# Patient Record
Sex: FEMALE | Race: BLACK OR AFRICAN AMERICAN | NOT HISPANIC OR LATINO | URBAN - METROPOLITAN AREA
[De-identification: names, ages, dates, MRNs, and addresses within clinical notes are randomized per-mention and may not be internally consistent; named-entity substitution may affect disease eponyms.]

---

## 2017-07-18 ENCOUNTER — INPATIENT (INPATIENT)
Facility: HOSPITAL | Age: 82
LOS: 7 days | Discharge: HOME | DRG: 570 | End: 2017-07-26
Attending: INTERNAL MEDICINE | Admitting: INTERNAL MEDICINE
Payer: MEDICARE

## 2017-07-18 VITALS
TEMPERATURE: 99 F | RESPIRATION RATE: 16 BRPM | SYSTOLIC BLOOD PRESSURE: 113 MMHG | DIASTOLIC BLOOD PRESSURE: 71 MMHG | HEART RATE: 71 BPM

## 2017-07-18 DIAGNOSIS — L89.154 PRESSURE ULCER OF SACRAL REGION, STAGE 4: ICD-10-CM

## 2017-07-18 LAB
ALBUMIN SERPL ELPH-MCNC: 2.9 G/DL — LOW (ref 3.3–5)
ALP SERPL-CCNC: 81 U/L — SIGNIFICANT CHANGE UP (ref 40–120)
ALT FLD-CCNC: 15 U/L RC — SIGNIFICANT CHANGE UP (ref 10–45)
ANION GAP SERPL CALC-SCNC: 15 MMOL/L — SIGNIFICANT CHANGE UP (ref 5–17)
APTT BLD: 24.3 SEC — LOW (ref 27.5–37.4)
AST SERPL-CCNC: 20 U/L — SIGNIFICANT CHANGE UP (ref 10–40)
BASOPHILS # BLD AUTO: 0 K/UL — SIGNIFICANT CHANGE UP (ref 0–0.2)
BASOPHILS NFR BLD AUTO: 0.2 % — SIGNIFICANT CHANGE UP (ref 0–2)
BILIRUB SERPL-MCNC: 0.4 MG/DL — SIGNIFICANT CHANGE UP (ref 0.2–1.2)
BUN SERPL-MCNC: 36 MG/DL — HIGH (ref 7–23)
CALCIUM SERPL-MCNC: 9.3 MG/DL — SIGNIFICANT CHANGE UP (ref 8.4–10.5)
CHLORIDE SERPL-SCNC: 104 MMOL/L — SIGNIFICANT CHANGE UP (ref 96–108)
CO2 SERPL-SCNC: 18 MMOL/L — LOW (ref 22–31)
CREAT SERPL-MCNC: 0.83 MG/DL — SIGNIFICANT CHANGE UP (ref 0.5–1.3)
EOSINOPHIL # BLD AUTO: 0.1 K/UL — SIGNIFICANT CHANGE UP (ref 0–0.5)
EOSINOPHIL NFR BLD AUTO: 0.4 % — SIGNIFICANT CHANGE UP (ref 0–6)
GAS PNL BLDV: SIGNIFICANT CHANGE UP
GLUCOSE SERPL-MCNC: 211 MG/DL — HIGH (ref 70–99)
HCT VFR BLD CALC: 25.9 % — LOW (ref 34.5–45)
HGB BLD-MCNC: 8.2 G/DL — LOW (ref 11.5–15.5)
INR BLD: 1.35 RATIO — HIGH (ref 0.88–1.16)
LYMPHOCYTES # BLD AUTO: 1 K/UL — SIGNIFICANT CHANGE UP (ref 1–3.3)
LYMPHOCYTES # BLD AUTO: 4.3 % — LOW (ref 13–44)
MCHC RBC-ENTMCNC: 27.2 PG — SIGNIFICANT CHANGE UP (ref 27–34)
MCHC RBC-ENTMCNC: 31.8 GM/DL — LOW (ref 32–36)
MCV RBC AUTO: 85.7 FL — SIGNIFICANT CHANGE UP (ref 80–100)
MONOCYTES # BLD AUTO: 1.1 K/UL — HIGH (ref 0–0.9)
MONOCYTES NFR BLD AUTO: 4.9 % — SIGNIFICANT CHANGE UP (ref 2–14)
NEUTROPHILS # BLD AUTO: 19.9 K/UL — HIGH (ref 1.8–7.4)
NEUTROPHILS NFR BLD AUTO: 90.2 % — HIGH (ref 43–77)
PLATELET # BLD AUTO: 497 K/UL — HIGH (ref 150–400)
POTASSIUM SERPL-MCNC: 4.8 MMOL/L — SIGNIFICANT CHANGE UP (ref 3.5–5.3)
POTASSIUM SERPL-SCNC: 4.8 MMOL/L — SIGNIFICANT CHANGE UP (ref 3.5–5.3)
PROT SERPL-MCNC: 6 G/DL — SIGNIFICANT CHANGE UP (ref 6–8.3)
PROTHROM AB SERPL-ACNC: 14.8 SEC — HIGH (ref 9.8–12.7)
RBC # BLD: 3.02 M/UL — LOW (ref 3.8–5.2)
RBC # FLD: 14.6 % — HIGH (ref 10.3–14.5)
SODIUM SERPL-SCNC: 137 MMOL/L — SIGNIFICANT CHANGE UP (ref 135–145)
WBC # BLD: 22.1 K/UL — HIGH (ref 3.8–10.5)
WBC # FLD AUTO: 22.1 K/UL — HIGH (ref 3.8–10.5)

## 2017-07-18 PROCEDURE — 99222 1ST HOSP IP/OBS MODERATE 55: CPT

## 2017-07-18 PROCEDURE — 72193 CT PELVIS W/DYE: CPT | Mod: 26

## 2017-07-18 PROCEDURE — 93306 TTE W/DOPPLER COMPLETE: CPT | Mod: 26

## 2017-07-18 PROCEDURE — 99285 EMERGENCY DEPT VISIT HI MDM: CPT | Mod: GC

## 2017-07-18 RX ORDER — SODIUM CHLORIDE 9 MG/ML
3 INJECTION INTRAMUSCULAR; INTRAVENOUS; SUBCUTANEOUS ONCE
Qty: 0 | Refills: 0 | Status: COMPLETED | OUTPATIENT
Start: 2017-07-18 | End: 2017-07-18

## 2017-07-18 RX ORDER — DEXTROSE 50 % IN WATER 50 %
25 SYRINGE (ML) INTRAVENOUS ONCE
Qty: 0 | Refills: 0 | Status: DISCONTINUED | OUTPATIENT
Start: 2017-07-18 | End: 2017-07-26

## 2017-07-18 RX ORDER — ASPIRIN/CALCIUM CARB/MAGNESIUM 324 MG
81 TABLET ORAL DAILY
Qty: 0 | Refills: 0 | Status: DISCONTINUED | OUTPATIENT
Start: 2017-07-18 | End: 2017-07-26

## 2017-07-18 RX ORDER — HEPARIN SODIUM 5000 [USP'U]/ML
5000 INJECTION INTRAVENOUS; SUBCUTANEOUS EVERY 12 HOURS
Qty: 0 | Refills: 0 | Status: DISCONTINUED | OUTPATIENT
Start: 2017-07-18 | End: 2017-07-26

## 2017-07-18 RX ORDER — LISINOPRIL 2.5 MG/1
20 TABLET ORAL DAILY
Qty: 0 | Refills: 0 | Status: DISCONTINUED | OUTPATIENT
Start: 2017-07-18 | End: 2017-07-18

## 2017-07-18 RX ORDER — PIPERACILLIN AND TAZOBACTAM 4; .5 G/20ML; G/20ML
3.38 INJECTION, POWDER, LYOPHILIZED, FOR SOLUTION INTRAVENOUS ONCE
Qty: 0 | Refills: 0 | Status: COMPLETED | OUTPATIENT
Start: 2017-07-18 | End: 2017-07-18

## 2017-07-18 RX ORDER — SODIUM CHLORIDE 9 MG/ML
1000 INJECTION INTRAMUSCULAR; INTRAVENOUS; SUBCUTANEOUS ONCE
Qty: 0 | Refills: 0 | Status: COMPLETED | OUTPATIENT
Start: 2017-07-18 | End: 2017-07-18

## 2017-07-18 RX ORDER — VANCOMYCIN HCL 1 G
1000 VIAL (EA) INTRAVENOUS EVERY 24 HOURS
Qty: 0 | Refills: 0 | Status: DISCONTINUED | OUTPATIENT
Start: 2017-07-18 | End: 2017-07-21

## 2017-07-18 RX ORDER — CARVEDILOL PHOSPHATE 80 MG/1
12.5 CAPSULE, EXTENDED RELEASE ORAL EVERY 12 HOURS
Qty: 0 | Refills: 0 | Status: DISCONTINUED | OUTPATIENT
Start: 2017-07-18 | End: 2017-07-26

## 2017-07-18 RX ORDER — INSULIN LISPRO 100/ML
VIAL (ML) SUBCUTANEOUS
Qty: 0 | Refills: 0 | Status: DISCONTINUED | OUTPATIENT
Start: 2017-07-18 | End: 2017-07-26

## 2017-07-18 RX ORDER — DEXTROSE 50 % IN WATER 50 %
1 SYRINGE (ML) INTRAVENOUS ONCE
Qty: 0 | Refills: 0 | Status: DISCONTINUED | OUTPATIENT
Start: 2017-07-18 | End: 2017-07-26

## 2017-07-18 RX ORDER — SODIUM CHLORIDE 9 MG/ML
1000 INJECTION, SOLUTION INTRAVENOUS
Qty: 0 | Refills: 0 | Status: DISCONTINUED | OUTPATIENT
Start: 2017-07-18 | End: 2017-07-26

## 2017-07-18 RX ORDER — LISINOPRIL 2.5 MG/1
10 TABLET ORAL DAILY
Qty: 0 | Refills: 0 | Status: DISCONTINUED | OUTPATIENT
Start: 2017-07-18 | End: 2017-07-20

## 2017-07-18 RX ORDER — DEXTROSE 50 % IN WATER 50 %
12.5 SYRINGE (ML) INTRAVENOUS ONCE
Qty: 0 | Refills: 0 | Status: DISCONTINUED | OUTPATIENT
Start: 2017-07-18 | End: 2017-07-26

## 2017-07-18 RX ORDER — OXYCODONE AND ACETAMINOPHEN 5; 325 MG/1; MG/1
1 TABLET ORAL EVERY 12 HOURS
Qty: 0 | Refills: 0 | Status: DISCONTINUED | OUTPATIENT
Start: 2017-07-18 | End: 2017-07-25

## 2017-07-18 RX ORDER — PIPERACILLIN AND TAZOBACTAM 4; .5 G/20ML; G/20ML
3.38 INJECTION, POWDER, LYOPHILIZED, FOR SOLUTION INTRAVENOUS EVERY 8 HOURS
Qty: 0 | Refills: 0 | Status: DISCONTINUED | OUTPATIENT
Start: 2017-07-18 | End: 2017-07-25

## 2017-07-18 RX ORDER — GLUCAGON INJECTION, SOLUTION 0.5 MG/.1ML
1 INJECTION, SOLUTION SUBCUTANEOUS ONCE
Qty: 0 | Refills: 0 | Status: DISCONTINUED | OUTPATIENT
Start: 2017-07-18 | End: 2017-07-26

## 2017-07-18 RX ORDER — VANCOMYCIN HCL 1 G
1000 VIAL (EA) INTRAVENOUS ONCE
Qty: 0 | Refills: 0 | Status: COMPLETED | OUTPATIENT
Start: 2017-07-18 | End: 2017-07-18

## 2017-07-18 RX ADMIN — PIPERACILLIN AND TAZOBACTAM 25 GRAM(S): 4; .5 INJECTION, POWDER, LYOPHILIZED, FOR SOLUTION INTRAVENOUS at 23:45

## 2017-07-18 RX ADMIN — OXYCODONE AND ACETAMINOPHEN 1 TABLET(S): 5; 325 TABLET ORAL at 19:08

## 2017-07-18 RX ADMIN — HEPARIN SODIUM 5000 UNIT(S): 5000 INJECTION INTRAVENOUS; SUBCUTANEOUS at 15:33

## 2017-07-18 RX ADMIN — Medication 81 MILLIGRAM(S): at 15:33

## 2017-07-18 RX ADMIN — PIPERACILLIN AND TAZOBACTAM 200 GRAM(S): 4; .5 INJECTION, POWDER, LYOPHILIZED, FOR SOLUTION INTRAVENOUS at 12:25

## 2017-07-18 RX ADMIN — CARVEDILOL PHOSPHATE 12.5 MILLIGRAM(S): 80 CAPSULE, EXTENDED RELEASE ORAL at 15:33

## 2017-07-18 RX ADMIN — OXYCODONE AND ACETAMINOPHEN 1 TABLET(S): 5; 325 TABLET ORAL at 19:44

## 2017-07-18 RX ADMIN — LISINOPRIL 20 MILLIGRAM(S): 2.5 TABLET ORAL at 15:33

## 2017-07-18 RX ADMIN — SODIUM CHLORIDE 3 MILLILITER(S): 9 INJECTION INTRAMUSCULAR; INTRAVENOUS; SUBCUTANEOUS at 12:05

## 2017-07-18 RX ADMIN — Medication 250 MILLIGRAM(S): at 13:01

## 2017-07-18 RX ADMIN — SODIUM CHLORIDE 1000 MILLILITER(S): 9 INJECTION INTRAMUSCULAR; INTRAVENOUS; SUBCUTANEOUS at 12:24

## 2017-07-18 NOTE — H&P ADULT - ASSESSMENT
pt  SENT  FROM N HOME  WITH  SACRAL PAIN FROM [facility], LOCAL  WOUND CARE, ON  ZOSYN,  HOUSE ID  CALLED, DVT   PPX, , ,  ANEMIA,  FOLLOW  LABS  IN AM pt  SENT  FROM  HOME  WITH  SACRAL PAIN FROM [facility], LOCAL  WOUND CARE, ON  ZOSYN,  HOUSE ID  CALLED, DVT   PPX, ,   chronic  ,  ANEMIA,  FOLLOW  LABS  IN AM,  ct pending,  spoke  with family,  has  MOLST  form in [place]/ pt is  DNR,  DECUBITIS  ULCER  IS  SO    WIDE  AND  DEEP, THAT  IT  WILL BE  IMPOSSIBLE TO  ACHIEVE  GOOD  WOUND  HEALING,  KEATNO  SINCE  PT  IS MOSTLY  BED  BOUND

## 2017-07-18 NOTE — CONSULT NOTE ADULT - SUBJECTIVE AND OBJECTIVE BOX
CHIEF COMPLAINT:Patient is a 84y old  Female who presents with a chief complaint of sacral  pain (18 Jul 2017 13:26) and sob.      HPI:   84yoF hx DM, HTN sent in from [facility] for 'wound care'. per granddaughter at bedside pt has been having sacral pain   from  sacral  ulcer.,   for  past  2  months,  pt has been in and out of hospitals since then. intermittent fevers/chills/weakness. no LE numbness. no cp, sob. states wound is foul smelling.,  seen in  er.  with  high  wbc,,  foul  smelling  large  ulcer noted. pt apparntly also is been having sob and tachycardia.      PAST MEDICAL & SURGICAL HISTORY:  No pertinent past medical history  No significant past surgical history      MEDICATIONS  (STANDING):  carvedilol 12.5 milliGRAM(s) Oral every 12 hours  aspirin enteric coated 81 milliGRAM(s) Oral daily  lisinopril 20 milliGRAM(s) Oral daily  insulin lispro (HumaLOG) corrective regimen sliding scale   SubCutaneous three times a day before meals  dextrose 5%. 1000 milliLiter(s) (50 mL/Hr) IV Continuous <Continuous>  dextrose 50% Injectable 12.5 Gram(s) IV Push once  dextrose 50% Injectable 25 Gram(s) IV Push once  dextrose 50% Injectable 25 Gram(s) IV Push once  heparin  Injectable 5000 Unit(s) SubCutaneous every 12 hours  piperacillin/tazobactam IVPB. 3.375 Gram(s) IV Intermittent every 8 hours    MEDICATIONS  (PRN):  dextrose Gel 1 Dose(s) Oral once PRN Blood Glucose LESS THAN 70 milliGRAM(s)/deciliter  glucagon  Injectable 1 milliGRAM(s) IntraMuscular once PRN Glucose LESS THAN 70 milligrams/deciliter  oxyCODONE    5 mG/acetaminophen 325 mG 1 Tablet(s) Oral every 12 hours PRN Moderate Pain (4 - 6)      FAMILY HISTORY:  No pertinent family history in first degree relatives      SOCIAL HISTORY:    [ ] Non-smoker  [ ] Smoker  [ ] Alcohol    Allergies    Sinemet (Unknown)    Intolerances    	    REVIEW OF SYSTEMS:  CONSTITUTIONAL: + fever, weight loss, fatigue  EYES: No eye pain, visual disturbances, or discharge  ENT:  No difficulty hearing, tinnitus, vertigo; No sinus or throat pain  NECK: No pain or stiffness  RESPIRATORY: No cough, wheezing, chills or hemoptysis; + Shortness of Breath  CARDIOVASCULAR: No chest pain, palpitations, passing out, dizziness, or leg swelling  GASTROINTESTINAL: No abdominal or epigastric pain. No nausea, vomiting, or hematemesis; No diarrhea or constipation. No melena or hematochezia.  GENITOURINARY: No dysuria, frequency, hematuria, or incontinence  NEUROLOGICAL: No headaches, memory loss, loss of strength, numbness, or tremors  SKIN: No itching, burning, rashes, or lesions   LYMPH Nodes: No enlarged glands  ENDOCRINE: No heat or cold intolerance; No hair loss  MUSCULOSKELETAL: +sacral pain  PSYCHIATRIC: No depression, anxiety, mood swings, or difficulty sleeping  HEME/LYMPH: No easy bruising, or bleeding gums  ALLERGY AND IMMUNOLOGIC: No hives or eczema	    [ ] All others negative	  [ ] Unable to obtain    PHYSICAL EXAM:  T(C): 36.9 (07-18-17 @ 13:10), Max: 37.2 (07-18-17 @ 11:40)  HR: 60 (07-18-17 @ 13:41) (60 - 71)  BP: 110/60 (07-18-17 @ 13:41) (100/52 - 116/69)  RR: 16 (07-18-17 @ 13:41) (16 - 18)  SpO2: 100% (07-18-17 @ 13:41) (97% - 100%)  Wt(kg): --  I&O's Summary      Appearance: Normal	  HEENT:   Normal oral mucosa, PERRL, EOMI	  Lymphatic: No lymphadenopathy  Cardiovascular: Normal S1 S2, No JVD,+ SE murmur, No edema  Respiratory: Lungs clear to auscultation	  Psychiatry: A & O x 3, Mood & affect appropriate  Gastrointestinal:  Soft, Non-tender, + BS	  Skin: No rashes, No ecchymoses, No cyanosis	  Neurologic: Non-focal  Extremities: Normal range of motion, No clubbing, cyanosis or edema  Vascular: Peripheral pulses palpable 2+ bilaterally    TELEMETRY: 	    ECG:  	  RADIOLOGY:  OTHER: 	  	  LABS:	 	    CARDIAC MARKERS:                              8.2    22.1  )-----------( 497      ( 18 Jul 2017 12:15 )             25.9     07-18    137  |  104  |  36<H>  ----------------------------<  211<H>  4.8   |  18<L>  |  0.83    Ca    9.3      18 Jul 2017 12:15    TPro  6.0  /  Alb  2.9<L>  /  TBili  0.4  /  DBili  x   /  AST  20  /  ALT  15  /  AlkPhos  81  07-18    proBNP:   Lipid Profile:   HgA1c:   TSH

## 2017-07-18 NOTE — ED ADULT NURSE NOTE - OBJECTIVE STATEMENT
83 y/o Female BIB EMS  from [facility] for 'wound care'.  As per granddaughter at bedside, pt has been having sacral pain with sacral decubitus over the last two months with foul smelling. Pt has intermittent fevers, chills, weakness.  No LE numbness, no chest pain,  no sob.  Skin warm and dry.  Large stage 4 sacral decubitus.  Respiration easy and non labored.  No edema.

## 2017-07-18 NOTE — H&P ADULT - NSHPLABSRESULTS_GEN_ALL_CORE
LABS:                        8.2    22.1  )-----------( 497      ( 18 Jul 2017 12:15 )             25.9     07-18    137  |  104  |  36<H>  ----------------------------<  211<H>  4.8   |  18<L>  |  0.83    Ca    9.3      18 Jul 2017 12:15    TPro  6.0  /  Alb  2.9<L>  /  TBili  0.4  /  DBili  x   /  AST  20  /  ALT  15  /  AlkPhos  81  07-18    PT/INR - ( 18 Jul 2017 12:15 )   PT: 14.8 sec;   INR: 1.35 ratio         PTT - ( 18 Jul 2017 12:15 )  PTT:24.3 sec        RADIOLOGY & ADDITIONAL TESTS:

## 2017-07-18 NOTE — ED ADULT NURSE REASSESSMENT NOTE - NS ED NURSE REASSESS COMMENT FT1
Addendum note:  Pt pulled out IV as per grand daughter,  Dr. Sepulveda informed.  Pt went to CT Scan,  IV inserted by IV nurse to left arm 22 guage in CT scan.   Dr Sepulveda aware.  Pt returned from CT scan with IV site mildly swollen from IV contrast as per Dr. Sepulveda.    MD does not want IV removed but site to be watched.  Warm compress applied as per MD.  Holding nurse Laura Minaya informed as well.

## 2017-07-18 NOTE — ED PROVIDER NOTE - MEDICAL DECISION MAKING DETAILS
Steph Sepulveda M.D: 84yoF p/w severe sacral ulcer, and intermittent fevers. hx dm. wound looks and smells infected, concern for osteomyelitis. labs, cultures, antibiotics, ct pelvis, TBA

## 2017-07-18 NOTE — CONSULT NOTE ADULT - ASSESSMENT
pt with sepsis with mara r/o aortic stenosis/endocarditis  check blood culture  agree with broad spectrum abx  decrease lisinopril dose due to decrease bp  check echo  tsh  dvt prophylaxis  plastic/wound consult.' pt with sepsis with mara r/o aortic stenosis/endocarditis  check blood culture  agree with broad spectrum abx  decrease lisinopril dose due to decrease bp  check echo  may add vancomycin  tsh  dvt prophylaxis  plastic/wound consult.'

## 2017-07-18 NOTE — ED PROVIDER NOTE - PROGRESS NOTE DETAILS
Patient was evaluated by me, found in no acute distress, calm, speaking in complete sentences. I agree with resident assessment and plan. Dr. Kishan Tong

## 2017-07-18 NOTE — CONSULT NOTE ADULT - SUBJECTIVE AND OBJECTIVE BOX
Patient is a 84y old  Female who presents with a chief complaint of sacral  pain (18 Jul 2017 13:26)    HPI:  : 84yoF hx DM, HTN sent in from [facility] for 'wound care'. per granddaughter at bedside pt has been having sacral pain   from  sacral  ulcer.,   for  past  2  months,  pt has been in and out of hospitals since then. intermittent fevers/chills/weakness. no LE numbness. no cp, sob. states wound is foul smelling.,  seen in  er.  with  high  wbc,,  foul  smelling  large  ulcer noted,  pt  seen in  er ,  house  id  called,  ct  pending. pt  has  MOLST  form,  in  chart,   DNR  confirmed  with  family (18 Jul 2017 13:26)    Seen in ED: "I dont know how this happened to me." She denies current pain      PAST MEDICAL & SURGICAL HISTORY:  No pertinent past medical history  No significant past surgical history      Social history:    FAMILY HISTORY:  No pertinent family history in first degree relatives      REVIEW OF SYSTEMS:  CONSTITUTIONAL: No weakness, fevers or chills  EYES/ENT: No visual changes;  No vertigo or throat pain   NECK: No pain or stiffness  RESPIRATORY: No cough, wheezing, hemoptysis; No shortness of breath  CARDIOVASCULAR: No chest pain or palpitations  GASTROINTESTINAL: No abdominal or epigastric pain. No nausea, vomiting, or hematemesis; No diarrhea or constipation. No melena or hematochezia.  GENITOURINARY: No dysuria, frequency or hematuria  NEUROLOGICAL: No numbness or weakness  SKIN: No itching, burning, rashes, or lesions   All other review of systems is negative unless indicated above    Allergies    Sinemet (Unknown)        Antimicrobials:  piperacillin/tazobactam IVPB. 3.375 Gram(s) IV Intermittent every 8 hours      Vital Signs Last 24 Hrs  T(C): 36.4 (18 Jul 2017 17:58), Max: 37.2 (18 Jul 2017 11:40)  T(F): 97.6 (18 Jul 2017 17:58), Max: 98.9 (18 Jul 2017 11:40)  HR: 62 (18 Jul 2017 17:58) (60 - 71)  BP: 95/50 (18 Jul 2017 17:58) (95/50 - 116/69)  BP(mean): --  RR: 18 (18 Jul 2017 17:58) (16 - 18)  SpO2: 96% (18 Jul 2017 17:58) (96% - 100%)    PHYSICAL EXAM:  General: WN/WD NAD, Non-toxic, chronically ill appearing, dry mucous membranes  Neurology: A&Ox3, nonfocal  Respiratory: Clear to auscultation bilaterally  CV: RRR, S1S2, no murmurs, rubs or gallops  Abdominal: Soft, Non-tender, non-distended, normal bowel sounds  Extremities: No edema, + peripheral pulses  Line Sites: Clear LUE  Skin: No rash  She declines exam of wound                        8.2    22.1  )-----------( 497      ( 18 Jul 2017 12:15 )             25.9       07-18    137  |  104  |  36<H>  ----------------------------<  211<H>  4.8   |  18<L>  |  0.83    Ca    9.3      18 Jul 2017 12:15    TPro  6.0  /  Alb  2.9<L>  /  TBili  0.4  /  DBili  x   /  AST  20  /  ALT  15  /  AlkPhos  81  07-18        MICROBIOLOGY:  none available    Radiology:  Patient is a 84y old  Female who presents with a chief complaint of sacral  pain (18 Jul 2017 13:26)    HPI:  : 84yoF hx DM, HTN sent in from [facility] for 'wound care'. per granddaughter at bedside pt has been having sacral pain   from  sacral  ulcer.,   for  past  2  months,  pt has been in and out of hospitals since then. intermittent fevers/chills/weakness. no LE numbness. no cp, sob. states wound is foul smelling.,  seen in  er.  with  high  wbc,,  foul  smelling  large  ulcer noted,  pt  seen in  er ,  house  id  called,  ct  pending. pt  has  MOLST  form,  in  chart,   DNR  confirmed  with  family (18 Jul 2017 13:26)      PAST MEDICAL & SURGICAL HISTORY:  No pertinent past medical history  No significant past surgical history      Social history:    FAMILY HISTORY:  No pertinent family history in first degree relatives      REVIEW OF SYSTEMS:  CONSTITUTIONAL: No weakness, fevers or chills  EYES/ENT: No visual changes;  No vertigo or throat pain   NECK: No pain or stiffness  RESPIRATORY: No cough, wheezing, hemoptysis; No shortness of breath  CARDIOVASCULAR: No chest pain or palpitations  GASTROINTESTINAL: No abdominal or epigastric pain. No nausea, vomiting, or hematemesis; No diarrhea or constipation. No melena or hematochezia.  GENITOURINARY: No dysuria, frequency or hematuria  NEUROLOGICAL: No numbness or weakness  SKIN: No itching, burning, rashes, or lesions   All other review of systems is negative unless indicated above    Allergies    Sinemet (Unknown)    Intolerances        Antimicrobials:  piperacillin/tazobactam IVPB. 3.375 Gram(s) IV Intermittent every 8 hours      Vital Signs Last 24 Hrs  T(C): 36.4 (18 Jul 2017 17:58), Max: 37.2 (18 Jul 2017 11:40)  T(F): 97.6 (18 Jul 2017 17:58), Max: 98.9 (18 Jul 2017 11:40)  HR: 62 (18 Jul 2017 17:58) (60 - 71)  BP: 95/50 (18 Jul 2017 17:58) (95/50 - 116/69)  BP(mean): --  RR: 18 (18 Jul 2017 17:58) (16 - 18)  SpO2: 96% (18 Jul 2017 17:58) (96% - 100%)    PHYSICAL EXAM:  General: WN/WD NAD, Non-toxic  Neurology: A&Ox3, nonfocal  Respiratory: Clear to auscultation bilaterally  CV: RRR, S1S2, no murmurs, rubs or gallops  Abdominal: Soft, Non-tender, non-distended, normal bowel sounds  Extremities: No edema, + peripheral pulses  Line Sites: Clear  Skin: No rash                          8.2    22.1  )-----------( 497      ( 18 Jul 2017 12:15 )             25.9       07-18    137  |  104  |  36<H>  ----------------------------<  211<H>  4.8   |  18<L>  |  0.83    Ca    9.3      18 Jul 2017 12:15    TPro  6.0  /  Alb  2.9<L>  /  TBili  0.4  /  DBili  x   /  AST  20  /  ALT  15  /  AlkPhos  81  07-18        MICROBIOLOGY:          Radiology:    Armando Yates MD; Division of Infectious Disease; Pager: [phone]; nights and weekends: [phone]    Armando Yates MD; Division of Infectious Disease; Pager: [phone]; nights and weekends: [phone]

## 2017-07-18 NOTE — ED PROVIDER NOTE - PHYSICAL EXAMINATION
Steph Sepulveda M.D.:   patient awake alert seen lying on stretcher, weak appearing, NAD .   LUNGS rhonchi b/l.   CARD RRR +systolic murmur.    Abdomen soft NT ND no rebound no guarding no CVA tenderness.   EXT WWP no edema no calf tenderness CV 2+DP/PT bilaterally.   neuro A&Ox3.    skin warm and dry large stage 4 sacral decub  HEENT: moist mucous membranes, PERRL, EOMI

## 2017-07-18 NOTE — ED PROVIDER NOTE - OBJECTIVE STATEMENT
Steph Sepulveda M.D: 84yoF hx DM, [facility] per granddaughter at bedside pt has been having sacral pain over the last two months. pt has been in and out of hospitals since then. intermittent fevers/chills/weakness. no LE numbness. no cp, sob. states wound is foul smelling.

## 2017-07-18 NOTE — H&P ADULT - NSHPPHYSICALEXAM_GEN_ALL_CORE
PHYSICAL EXAMINATION:  Vital Signs Last 24 Hrs  T(C): 37.2 (18 Jul 2017 11:40), Max: 37.2 (18 Jul 2017 11:40)  T(F): 98.9 (18 Jul 2017 11:40), Max: 98.9 (18 Jul 2017 11:40)  HR: 64 (18 Jul 2017 12:40) (60 - 71)  BP: 115/54 (18 Jul 2017 12:40) (100/52 - 115/54)  BP(mean): --  RR: 16 (18 Jul 2017 12:40) (16 - 18)  SpO2: 97% (18 Jul 2017 12:40) (97% - 99%)  CAPILLARY BLOOD GLUCOSE            GENERAL: NAD, well-groomed, well-developed  HEAD:  atraumatic, normocephalic  EYES: sclera anicteric  ENMT: mucous membranes moist  NECK: supple, No JVD  CHEST/LUNG: clear to auscultation bilaterally; no rales, rhonchi, or wheezing b/l  HEART: normal S1, S2  ABDOMEN: BS+, soft, ND, NT   EXTREMITIES:  pulses palpable; no clubbing, cyanosis, or edema b/l LEs  NEURO: awake, alert, interactive; moves all extremities  SKIN: no rashes or lesions  ,  scaral  ulcer  stage 3 PHYSICAL EXAMINATION:  Vital Signs Last 24 Hrs  T(C): 37.2 (18 Jul 2017 11:40), Max: 37.2 (18 Jul 2017 11:40)  T(F): 98.9 (18 Jul 2017 11:40), Max: 98.9 (18 Jul 2017 11:40)  HR: 64 (18 Jul 2017 12:40) (60 - 71)  BP: 115/54 (18 Jul 2017 12:40) (100/52 - 115/54)  BP(mean): --  RR: 16 (18 Jul 2017 12:40) (16 - 18)  SpO2: 97% (18 Jul 2017 12:40) (97% - 99%)  CAPILLARY BLOOD GLUCOSE            GENERAL: NAD, well-groomed, well-developed  HEAD:  atraumatic, normocephalic  EYES: sclera anicteric  ENMT: mucous membranes moist  NECK: supple, No JVD  CHEST/LUNG: clear to auscultation bilaterally; no rales, rhonchi, or wheezing b/l  HEART: normal S1, S2  ABDOMEN: BS+, soft, ND, NT   EXTREMITIES:  pulses palpable; no clubbing, cyanosis, or edema b/l LEs  NEURO: awake, alert, interactive; moves all extremities  SKIN: no rashes or lesions  ,  scaral  ulcer  stage   4,  very  large involving   a very  large  area,  with  foul  smell

## 2017-07-18 NOTE — H&P ADULT - HISTORY OF PRESENT ILLNESS
: 84yoF hx DM, HTN sent in from [facility] for 'wound care'. per granddaughter at bedside pt has been having sacral pain over the last two months. pt has been in and out of hospitals since then. intermittent fevers/chills/weakness. no LE numbness. no cp, sob. states wound is foul smelling.,  seen in  er.  with  high  wbc, : 84yoF hx DM, HTN sent in from [facility] for 'wound care'. per granddaughter at bedside pt has been having sacral pain   from  sacral  ulcer.,   for  past  2  months,  pt has been in and out of hospitals since then. intermittent fevers/chills/weakness. no LE numbness. no cp, sob. states wound is foul smelling.,  seen in  er.  with  high  wbc,,  foul  smelling  large  ulcer noted,  pt  seen in  er ,  house  id  called,  ct  pending. pt  has  MOLST  form,  in  chart,   DNR  confirmed  with  family

## 2017-07-18 NOTE — CONSULT NOTE ADULT - ASSESSMENT
large sacral wound with malodorour drainage with marked leukocytosis -    Suggest Add Vanco (ordered)  Wound care consult  Wound culture    will follow alert

## 2017-07-19 ENCOUNTER — TRANSCRIPTION ENCOUNTER (OUTPATIENT)
Age: 82
End: 2017-07-19

## 2017-07-19 LAB
ANION GAP SERPL CALC-SCNC: 17 MMOL/L — SIGNIFICANT CHANGE UP (ref 5–17)
BLD GP AB SCN SERPL QL: POSITIVE — SIGNIFICANT CHANGE UP
BUN SERPL-MCNC: 39 MG/DL — HIGH (ref 7–23)
CALCIUM SERPL-MCNC: 9.4 MG/DL — SIGNIFICANT CHANGE UP (ref 8.4–10.5)
CHLORIDE SERPL-SCNC: 106 MMOL/L — SIGNIFICANT CHANGE UP (ref 96–108)
CO2 SERPL-SCNC: 17 MMOL/L — LOW (ref 22–31)
CREAT SERPL-MCNC: 1.1 MG/DL — SIGNIFICANT CHANGE UP (ref 0.5–1.3)
DAT POLY-SP REAG RBC QL: NEGATIVE — SIGNIFICANT CHANGE UP
GLUCOSE SERPL-MCNC: 151 MG/DL — HIGH (ref 70–99)
HBA1C BLD-MCNC: 6.9 % — HIGH (ref 4–5.6)
HCT VFR BLD CALC: 22.4 % — LOW (ref 34.5–45)
HGB BLD-MCNC: 7.4 G/DL — LOW (ref 11.5–15.5)
MCHC RBC-ENTMCNC: 25.6 PG — LOW (ref 27–34)
MCHC RBC-ENTMCNC: 33 GM/DL — SIGNIFICANT CHANGE UP (ref 32–36)
MCV RBC AUTO: 77.5 FL — LOW (ref 80–100)
PLATELET # BLD AUTO: 620 K/UL — HIGH (ref 150–400)
POTASSIUM SERPL-MCNC: 4.5 MMOL/L — SIGNIFICANT CHANGE UP (ref 3.5–5.3)
POTASSIUM SERPL-SCNC: 4.5 MMOL/L — SIGNIFICANT CHANGE UP (ref 3.5–5.3)
RBC # BLD: 2.89 M/UL — LOW (ref 3.8–5.2)
RBC # FLD: 15.4 % — HIGH (ref 10.3–14.5)
RH IG SCN BLD-IMP: NEGATIVE — SIGNIFICANT CHANGE UP
RH IG SCN BLD-IMP: NEGATIVE — SIGNIFICANT CHANGE UP
SODIUM SERPL-SCNC: 140 MMOL/L — SIGNIFICANT CHANGE UP (ref 135–145)
TSH SERPL-MCNC: 0.61 UIU/ML — SIGNIFICANT CHANGE UP (ref 0.27–4.2)
VIT B12 SERPL-MCNC: 705 PG/ML — SIGNIFICANT CHANGE UP (ref 243–894)
WBC # BLD: 23.78 K/UL — HIGH (ref 3.8–10.5)
WBC # FLD AUTO: 23.78 K/UL — HIGH (ref 3.8–10.5)

## 2017-07-19 PROCEDURE — 99232 SBSQ HOSP IP/OBS MODERATE 35: CPT

## 2017-07-19 PROCEDURE — 97597 DBRDMT OPN WND 1ST 20 CM/<: CPT

## 2017-07-19 PROCEDURE — 86077 PHYS BLOOD BANK SERV XMATCH: CPT

## 2017-07-19 PROCEDURE — 97598 DBRDMT OPN WND ADDL 20CM/<: CPT

## 2017-07-19 PROCEDURE — 99233 SBSQ HOSP IP/OBS HIGH 50: CPT

## 2017-07-19 RX ADMIN — CARVEDILOL PHOSPHATE 12.5 MILLIGRAM(S): 80 CAPSULE, EXTENDED RELEASE ORAL at 06:53

## 2017-07-19 RX ADMIN — Medication 81 MILLIGRAM(S): at 12:44

## 2017-07-19 RX ADMIN — HEPARIN SODIUM 5000 UNIT(S): 5000 INJECTION INTRAVENOUS; SUBCUTANEOUS at 18:41

## 2017-07-19 RX ADMIN — PIPERACILLIN AND TAZOBACTAM 25 GRAM(S): 4; .5 INJECTION, POWDER, LYOPHILIZED, FOR SOLUTION INTRAVENOUS at 06:53

## 2017-07-19 RX ADMIN — Medication 250 MILLIGRAM(S): at 06:53

## 2017-07-19 RX ADMIN — LISINOPRIL 10 MILLIGRAM(S): 2.5 TABLET ORAL at 06:53

## 2017-07-19 RX ADMIN — PIPERACILLIN AND TAZOBACTAM 25 GRAM(S): 4; .5 INJECTION, POWDER, LYOPHILIZED, FOR SOLUTION INTRAVENOUS at 14:31

## 2017-07-19 RX ADMIN — Medication 2: at 18:41

## 2017-07-19 RX ADMIN — Medication 2: at 12:44

## 2017-07-19 RX ADMIN — HEPARIN SODIUM 5000 UNIT(S): 5000 INJECTION INTRAVENOUS; SUBCUTANEOUS at 06:53

## 2017-07-19 RX ADMIN — OXYCODONE AND ACETAMINOPHEN 1 TABLET(S): 5; 325 TABLET ORAL at 20:45

## 2017-07-19 RX ADMIN — OXYCODONE AND ACETAMINOPHEN 1 TABLET(S): 5; 325 TABLET ORAL at 20:09

## 2017-07-19 RX ADMIN — Medication 2: at 08:39

## 2017-07-19 RX ADMIN — PIPERACILLIN AND TAZOBACTAM 25 GRAM(S): 4; .5 INJECTION, POWDER, LYOPHILIZED, FOR SOLUTION INTRAVENOUS at 21:51

## 2017-07-19 RX ADMIN — CARVEDILOL PHOSPHATE 12.5 MILLIGRAM(S): 80 CAPSULE, EXTENDED RELEASE ORAL at 18:41

## 2017-07-19 NOTE — PROGRESS NOTE ADULT - ASSESSMENT
stage IV sacral decub in bed bound patient with leukocytosis - not clinically toxic    Suggest continue Zosyn, vanco,   wound care   - 7-10 day course IV abx anticipated  Consider Neuro evaluation to assess whether patient can improve mobility

## 2017-07-19 NOTE — DISCHARGE NOTE ADULT - PATIENT PORTAL LINK FT
“You can access the [employer] Patient Portal, offered by [facility], by registering with the following website: [email or web address]”

## 2017-07-19 NOTE — CONSULT NOTE ADULT - SUBJECTIVE AND OBJECTIVE BOX
Wound SURGERY CONSULT NOTE    HPI:  84yoF hx DM, HTN sent in from [facility] for 'wound care'.  (+) pain  from  sacral  ulcer,   for  past  2  months,  pt has been in and out of hospitals since then. intermittent fevers/chills/weakness. no LE numbness. no cp, sob. states wound is foul smelling.,  seen in  er.  with  high  wbc,  foul  smelling  large  ulcer noted,  ID consult noted pt  has  MOLST  form,  in  chart,   DNR  confirmed  with  family.  pt poor historian.  (+)incontinent.  (+)sedentary 2/2 weakness- pt notes participation in PT though      PAST MEDICAL & SURGICAL HISTORY:  DM  HTN    REVIEW OF SYSTEMS    Skin: sacral wound worsening see HPI  all other systems negative      MEDICATIONS  (STANDING):  carvedilol 12.5 milliGRAM(s) Oral every 12 hours  aspirin enteric coated 81 milliGRAM(s) Oral daily  insulin lispro (HumaLOG) corrective regimen sliding scale   SubCutaneous three times a day before meals  dextrose 5%. 1000 milliLiter(s) (50 mL/Hr) IV Continuous <Continuous>  dextrose 50% Injectable 12.5 Gram(s) IV Push once  dextrose 50% Injectable 25 Gram(s) IV Push once  dextrose 50% Injectable 25 Gram(s) IV Push once  heparin  Injectable 5000 Unit(s) SubCutaneous every 12 hours  piperacillin/tazobactam IVPB. 3.375 Gram(s) IV Intermittent every 8 hours  lisinopril 10 milliGRAM(s) Oral daily  vancomycin  IVPB 1000 milliGRAM(s) IV Intermittent every 24 hours    MEDICATIONS  (PRN):  dextrose Gel 1 Dose(s) Oral once PRN Blood Glucose LESS THAN 70 milliGRAM(s)/deciliter  glucagon  Injectable 1 milliGRAM(s) IntraMuscular once PRN Glucose LESS THAN 70 milligrams/deciliter  oxyCODONE    5 mG/acetaminophen 325 mG 1 Tablet(s) Oral every 12 hours PRN Moderate Pain (4 - 6)      Allergies    Sinemet (Unknown)    SOCIAL HISTORY:  at [facility]; Denies smoking, ETOH, drugs    FAMILY HISTORY:  No pertinent family history in first degree relatives      Vital Signs Last 24 Hrs  T(C): 36.9 (19 Jul 2017 12:03), Max: 37.2 (19 Jul 2017 04:33)  T(F): 98.4 (19 Jul 2017 12:03), Max: 98.9 (19 Jul 2017 04:33)  HR: 67 (19 Jul 2017 12:03) (60 - 84)  BP: 115/71 (19 Jul 2017 12:03) (95/50 - 115/71)  BP(mean): --  RR: 18 (19 Jul 2017 12:03) (16 - 18)  SpO2: 96% (19 Jul 2017 12:03) (96% - 100%)    NAD A&Ox3 MO  Versa care P500 bed    Cardiovascular: RRR (+)m    Respiratory: CTA    Gastrointestinal soft NT/ND (+)BS    Neurology sensation grossly intact, weakened strength    Musculoskeletal/Vascular:  FROM x4 limited by weakness  No edema, equally warm  no deformities/ contractures    Skin:  moist w/ good turgor  Sacral Stage IV pressure ulcer (+) 20% moist & granular,  80% necrotic and slough debulked  9.5cm x 9cm x 6cm after debridement- undermining now noted from 6-3 o'clock w/greatest of 7cm tunnel at 6 o'clock into Lt buttock and 6 cm at 2-3 o'clcok  Procedure Note  Using aseptic technique sacral  wound was excisionally debrided using a scissor and forceps through necrotic/ nonviable dermis & subcutaneous tissue.  Pt tolerated procedure well.  Hemostasis was maintained throughout.  After normal saline irrigation wound was packed w/ Medihoney and gauze.   serosanguinous drainage  (+)malodor  (+)exposed bone-rough edges  No erythema, increased warmth, tenderness, induration, fluctuance    LABS:                        7.4    23.78 )-----------( 620      ( 19 Jul 2017 07:25 )             22.4     07-19    140  |  106  |  39<H>  ----------------------------<  151<H>  4.5   |  17<L>  |  1.10    Ca    9.4      19 Jul 2017 07:30    TPro  6.0  /  Alb  2.9<L>  /  TBili  0.4  /  DBili  x   /  AST  20  /  ALT  15  /  AlkPhos  81  07-18    PT/INR - ( 18 Jul 2017 12:15 )   PT: 14.8 sec;   INR: 1.35 ratio      PTT - ( 18 Jul 2017 12:15 )  PTT:24.3 sec    HgA1c  07-19 @ 07:25  6.9      RADIOLOGY & ADDITIONAL STUDIES:    CT Pelvis w/ IV Cont (07.18.17 @ 16:31) >  MPRESSION: Large ulceration in the soft tissues overlying and inferior   to the sacrum and coccyx extending down to level of bone. Associated   marked destruction of the lower sacrum and coccyx, consistent with   osteomyelitis. Adjacent abscesses within the medial aspect of the right   gluteal musculature.

## 2017-07-19 NOTE — PROGRESS NOTE ADULT - ASSESSMENT
sepsis  from infected  sacral decubti, local wound cafe, seen by id, on zosyn/vanco,  dvt ppx,  c/c anemia,. baseline  from n home  is  7,  will give  prbc

## 2017-07-19 NOTE — CONSULT NOTE ADULT - ASSESSMENT
A/P:  Stage IV Sacral Pressure Ulcer  Medihoney and packing w/ cling roll gauze  Abx as per ID  con't Nutrition (as tolerated)  con't Offloading   con't Pericare  f/u as outpatient at [facility] [address] [phone]  Care as per medicine will follow w/ you  Seen w/ attng and d/w team  Thank you for this consult  Carmen Mabry PA-C [id number]

## 2017-07-19 NOTE — DISCHARGE NOTE ADULT - PLAN OF CARE
resolution of symptoms wound care  Dakins 1/4 strength daily to sacrum  irrigate with normal saline  pat dry  cavilon to hemalatha wound  pack with Dakins moistened cling  pack into tunnels at 6 o'clock and into left buttock at 2and 3 o'clock  cover with gauze and tegaderm HgA1C this admission.  Make sure you get your HgA1c checked every three months.  If you take oral diabetes medications, check your blood glucose two times a day.  If you take insulin, check your blood glucose before meals and at bedtime.  It's important not to skip any meals.  Keep a log of your blood glucose results and always take it with you to your doctor appointments.  Keep a list of your current medications including injectables and over the counter medications and bring this medication list with you to all your doctor appointments.  If you have not seen your ophthalmologist this year call for appointment.  Check your feet daily for redness, sores, or openings. Do not self treat. If no improvement in two days call your primary care physician for an appointment.  Low blood sugar (hypoglycemia) is a blood sugar below 70mg/dl. Check your blood sugar if you feel signs/symptoms of hypoglycemia. If your blood sugar is below 70 take 15 grams of carbohydrates (ex 4 oz of apple juice, 3-4 glucose tablets, or 4-6 oz of regular soda) wait 15 minutes and repeat blood sugar to make sure it comes up above 70.  If your blood sugar is above 70 and you are due for a meal, have a meal.  If you are not due for a meal have a snack.  This snack helps keeps your blood sugar at a safe range. Follow up with your medical doctor to establish long term blood pressure treatment goals. wound care  . Sacrum: Cavilon to periwound skin, D/c Dakin's dressing. Apply Medihoney colloid to base of wound. Gently fill wound cavity, tunnelled areas at 1 and 6 o'clock and undermined areas from 6-12 o'clock with continuos moistened roll gauze. cover with gauze and secure with tegaderm. change daily  and prn for drainage.

## 2017-07-19 NOTE — DISCHARGE NOTE ADULT - CARE PLAN
Principal Discharge DX:	Sacral decubitus ulcer, stage IV  Goal:	resolution of symptoms  Instructions for follow-up, activity and diet:	wound care  Dakins 1/4 strength daily to sacrum  irrigate with normal saline  pat dry  cavilon to hemalatha wound  pack with Dakins moistened cling  pack into tunnels at 6 o'clock and into left buttock at 2and 3 o'clock  cover with gauze and tegaderm  Secondary Diagnosis:	Diabetes  Instructions for follow-up, activity and diet:	HgA1C this admission.  Make sure you get your HgA1c checked every three months.  If you take oral diabetes medications, check your blood glucose two times a day.  If you take insulin, check your blood glucose before meals and at bedtime.  It's important not to skip any meals.  Keep a log of your blood glucose results and always take it with you to your doctor appointments.  Keep a list of your current medications including injectables and over the counter medications and bring this medication list with you to all your doctor appointments.  If you have not seen your ophthalmologist this year call for appointment.  Check your feet daily for redness, sores, or openings. Do not self treat. If no improvement in two days call your primary care physician for an appointment.  Low blood sugar (hypoglycemia) is a blood sugar below 70mg/dl. Check your blood sugar if you feel signs/symptoms of hypoglycemia. If your blood sugar is below 70 take 15 grams of carbohydrates (ex 4 oz of apple juice, 3-4 glucose tablets, or 4-6 oz of regular soda) wait 15 minutes and repeat blood sugar to make sure it comes up above 70.  If your blood sugar is above 70 and you are due for a meal, have a meal.  If you are not due for a meal have a snack.  This snack helps keeps your blood sugar at a safe range.  Secondary Diagnosis:	HTN (hypertension)  Instructions for follow-up, activity and diet:	Follow up with your medical doctor to establish long term blood pressure treatment goals. Principal Discharge DX:	Sacral decubitus ulcer, stage IV  Goal:	resolution of symptoms  Instructions for follow-up, activity and diet:	wound care  . Sacrum: Cavilon to periwound skin, D/c Dakin's dressing. Apply Medihoney colloid to base of wound. Gently fill wound cavity, tunnelled areas at 1 and 6 o'clock and undermined areas from 6-12 o'clock with continuos moistened roll gauze. cover with gauze and secure with tegaderm. change daily  and prn for drainage.  Secondary Diagnosis:	Diabetes  Instructions for follow-up, activity and diet:	HgA1C this admission.  Make sure you get your HgA1c checked every three months.  If you take oral diabetes medications, check your blood glucose two times a day.  If you take insulin, check your blood glucose before meals and at bedtime.  It's important not to skip any meals.  Keep a log of your blood glucose results and always take it with you to your doctor appointments.  Keep a list of your current medications including injectables and over the counter medications and bring this medication list with you to all your doctor appointments.  If you have not seen your ophthalmologist this year call for appointment.  Check your feet daily for redness, sores, or openings. Do not self treat. If no improvement in two days call your primary care physician for an appointment.  Low blood sugar (hypoglycemia) is a blood sugar below 70mg/dl. Check your blood sugar if you feel signs/symptoms of hypoglycemia. If your blood sugar is below 70 take 15 grams of carbohydrates (ex 4 oz of apple juice, 3-4 glucose tablets, or 4-6 oz of regular soda) wait 15 minutes and repeat blood sugar to make sure it comes up above 70.  If your blood sugar is above 70 and you are due for a meal, have a meal.  If you are not due for a meal have a snack.  This snack helps keeps your blood sugar at a safe range.  Secondary Diagnosis:	HTN (hypertension)  Instructions for follow-up, activity and diet:	Follow up with your medical doctor to establish long term blood pressure treatment goals.

## 2017-07-19 NOTE — DISCHARGE NOTE ADULT - MEDICATION SUMMARY - MEDICATIONS TO TAKE
I will START or STAY ON the medications listed below when I get home from the hospital:    aspirin 81 mg oral delayed release tablet  -- 1 tab(s) by mouth once a day  -- Indication: For Prophylaxis    lisinopril 2.5 mg oral tablet  -- 1 tab(s) by mouth once a day  -- Indication: For htn    insulin lispro 100 units/mL subcutaneous solution  --  subcutaneous 3 times a day (before meals); 1 Unit(s) if Glucose 151 - 200  2 Unit(s) if Glucose 201 - 250  3 Unit(s) if Glucose 251 - 300  4 Unit(s) if Glucose 301 - 350  5 Unit(s) if Glucose 351 - 400  6 Unit(s) if Glucose Greater Than 400  -- Indication: For Diabetes    sodium hypochlorite 0.125% topical solution  -- 1 application on skin once a day  -- Indication: For wound    carvedilol 12.5 mg oral tablet  -- 1 tab(s) by mouth every 12 hours  -- Indication: For htn    ertapenem 1 g injection  --  injectable   -- Indication: For wound    Multiple Vitamins oral tablet  -- 1 tab(s) by mouth once a day  -- Indication: For Supplement    ascorbic acid 500 mg oral tablet  -- 1 tab(s) by mouth once a day  -- Indication: For Supplement I will START or STAY ON the medications listed below when I get home from the hospital:    aspirin 81 mg oral delayed release tablet  -- 1 tab(s) by mouth once a day  -- Indication: For Prophylaxis    lisinopril 2.5 mg oral tablet  -- 1 tab(s) by mouth once a day  -- Indication: For htn    insulin lispro 100 units/mL subcutaneous solution  --  subcutaneous 3 times a day (before meals); 1 Unit(s) if Glucose 151 - 200  2 Unit(s) if Glucose 201 - 250  3 Unit(s) if Glucose 251 - 300  4 Unit(s) if Glucose 301 - 350  5 Unit(s) if Glucose 351 - 400  6 Unit(s) if Glucose Greater Than 400  -- Indication: For Diabetes    carvedilol 12.5 mg oral tablet  -- 1 tab(s) by mouth every 12 hours  -- Indication: For htn    ertapenem 1 g injection  --  injectable   -- Indication: For wound    Multiple Vitamins oral tablet  -- 1 tab(s) by mouth once a day  -- Indication: For Supplement    ascorbic acid 500 mg oral tablet  -- 1 tab(s) by mouth once a day  -- Indication: For Supplement

## 2017-07-19 NOTE — DISCHARGE NOTE ADULT - HOSPITAL COURSE
sacral pain,  from  stage  4 osteomyelitis,  seen by  house  id  and  wound  care, on  dakins  solution    wound  c/s  with  thomas howard to  rehab,  when  cleared  by  is    h/o   htn,  on  dvt  ppz.  guerrero  to promote  wound  healing,    pt  mostly  bed bound,  therefore   likelihood  of  wound  healing  is  not  so  great,  h/o  of  obesity sacral pain,  from  stage  4 osteomyelitis,  seen by  house  id  and  wound  care, on  dakins  solution    wound  c/s  with  anita,  wound    now,    dc to  rehab,, cleared  by zehra fernández,   has  picc line,  on  Invanz,  to complete  7/ 31./ 17    h/o   htn,  on  dvt  ppx,   guerrero  to promote  wound  healing,     htn.  dm,    pt  mostly  bed bound,  therefore   likelihood  of  wound  healing  is  not  so  great,  h/o  of  obesity

## 2017-07-20 LAB
-  AMIKACIN: SIGNIFICANT CHANGE UP
-  AMPICILLIN/SULBACTAM: SIGNIFICANT CHANGE UP
-  AMPICILLIN: SIGNIFICANT CHANGE UP
-  AZTREONAM: SIGNIFICANT CHANGE UP
-  CEFAZOLIN: SIGNIFICANT CHANGE UP
-  CEFEPIME: SIGNIFICANT CHANGE UP
-  CEFOXITIN: SIGNIFICANT CHANGE UP
-  CEFTAZIDIME: SIGNIFICANT CHANGE UP
-  CEFTRIAXONE: SIGNIFICANT CHANGE UP
-  CIPROFLOXACIN: SIGNIFICANT CHANGE UP
-  ERTAPENEM: SIGNIFICANT CHANGE UP
-  GENTAMICIN: SIGNIFICANT CHANGE UP
-  IMIPENEM: SIGNIFICANT CHANGE UP
-  LEVOFLOXACIN: SIGNIFICANT CHANGE UP
-  MEROPENEM: SIGNIFICANT CHANGE UP
-  PIPERACILLIN/TAZOBACTAM: SIGNIFICANT CHANGE UP
-  TOBRAMYCIN: SIGNIFICANT CHANGE UP
-  TRIMETHOPRIM/SULFAMETHOXAZOLE: SIGNIFICANT CHANGE UP
APPEARANCE UR: ABNORMAL
BILIRUB UR-MCNC: NEGATIVE — SIGNIFICANT CHANGE UP
COLOR SPEC: YELLOW — SIGNIFICANT CHANGE UP
CULTURE RESULTS: SIGNIFICANT CHANGE UP
DIFF PNL FLD: ABNORMAL
GLUCOSE UR QL: NEGATIVE — SIGNIFICANT CHANGE UP
HCT VFR BLD CALC: 24.2 % — LOW (ref 34.5–45)
HCT VFR BLD CALC: 25.8 % — LOW (ref 34.5–45)
HGB BLD-MCNC: 8 G/DL — LOW (ref 11.5–15.5)
HGB BLD-MCNC: 8.5 G/DL — LOW (ref 11.5–15.5)
KETONES UR-MCNC: NEGATIVE — SIGNIFICANT CHANGE UP
LEUKOCYTE ESTERASE UR-ACNC: ABNORMAL
MCHC RBC-ENTMCNC: 26.4 PG — LOW (ref 27–34)
MCHC RBC-ENTMCNC: 28.5 PG — SIGNIFICANT CHANGE UP (ref 27–34)
MCHC RBC-ENTMCNC: 32.8 GM/DL — SIGNIFICANT CHANGE UP (ref 32–36)
MCHC RBC-ENTMCNC: 33.1 GM/DL — SIGNIFICANT CHANGE UP (ref 32–36)
MCV RBC AUTO: 79.9 FL — LOW (ref 80–100)
MCV RBC AUTO: 87.1 FL — SIGNIFICANT CHANGE UP (ref 80–100)
METHOD TYPE: SIGNIFICANT CHANGE UP
NITRITE UR-MCNC: NEGATIVE — SIGNIFICANT CHANGE UP
ORGANISM # SPEC MICROSCOPIC CNT: SIGNIFICANT CHANGE UP
ORGANISM # SPEC MICROSCOPIC CNT: SIGNIFICANT CHANGE UP
PH UR: 6.5 — SIGNIFICANT CHANGE UP (ref 5–8)
PLATELET # BLD AUTO: 515 K/UL — HIGH (ref 150–400)
PLATELET # BLD AUTO: 612 K/UL — HIGH (ref 150–400)
PROT UR-MCNC: 100 MG/DL
RBC # BLD: 2.96 M/UL — LOW (ref 3.8–5.2)
RBC # BLD: 3.03 M/UL — LOW (ref 3.8–5.2)
RBC # FLD: 14.9 % — HIGH (ref 10.3–14.5)
RBC # FLD: 16.3 % — HIGH (ref 10.3–14.5)
SP GR SPEC: 1.02 — SIGNIFICANT CHANGE UP (ref 1.01–1.02)
SPECIMEN SOURCE: SIGNIFICANT CHANGE UP
UROBILINOGEN FLD QL: NEGATIVE — SIGNIFICANT CHANGE UP
WBC # BLD: 20.65 K/UL — HIGH (ref 3.8–10.5)
WBC # BLD: 20.9 K/UL — HIGH (ref 3.8–10.5)
WBC # FLD AUTO: 20.65 K/UL — HIGH (ref 3.8–10.5)
WBC # FLD AUTO: 20.9 K/UL — HIGH (ref 3.8–10.5)

## 2017-07-20 PROCEDURE — 99232 SBSQ HOSP IP/OBS MODERATE 35: CPT

## 2017-07-20 RX ORDER — SODIUM HYPOCHLORITE 0.125 %
1 SOLUTION, NON-ORAL MISCELLANEOUS DAILY
Qty: 0 | Refills: 0 | Status: DISCONTINUED | OUTPATIENT
Start: 2017-07-20 | End: 2017-07-26

## 2017-07-20 RX ORDER — LISINOPRIL 2.5 MG/1
2.5 TABLET ORAL DAILY
Qty: 0 | Refills: 0 | Status: DISCONTINUED | OUTPATIENT
Start: 2017-07-20 | End: 2017-07-26

## 2017-07-20 RX ADMIN — Medication 1: at 08:21

## 2017-07-20 RX ADMIN — Medication 250 MILLIGRAM(S): at 05:45

## 2017-07-20 RX ADMIN — Medication 1 APPLICATION(S): at 21:49

## 2017-07-20 RX ADMIN — HEPARIN SODIUM 5000 UNIT(S): 5000 INJECTION INTRAVENOUS; SUBCUTANEOUS at 05:44

## 2017-07-20 RX ADMIN — OXYCODONE AND ACETAMINOPHEN 1 TABLET(S): 5; 325 TABLET ORAL at 15:10

## 2017-07-20 RX ADMIN — CARVEDILOL PHOSPHATE 12.5 MILLIGRAM(S): 80 CAPSULE, EXTENDED RELEASE ORAL at 17:27

## 2017-07-20 RX ADMIN — OXYCODONE AND ACETAMINOPHEN 1 TABLET(S): 5; 325 TABLET ORAL at 16:10

## 2017-07-20 RX ADMIN — Medication 2: at 12:22

## 2017-07-20 RX ADMIN — CARVEDILOL PHOSPHATE 12.5 MILLIGRAM(S): 80 CAPSULE, EXTENDED RELEASE ORAL at 05:43

## 2017-07-20 RX ADMIN — PIPERACILLIN AND TAZOBACTAM 25 GRAM(S): 4; .5 INJECTION, POWDER, LYOPHILIZED, FOR SOLUTION INTRAVENOUS at 05:45

## 2017-07-20 RX ADMIN — PIPERACILLIN AND TAZOBACTAM 25 GRAM(S): 4; .5 INJECTION, POWDER, LYOPHILIZED, FOR SOLUTION INTRAVENOUS at 21:49

## 2017-07-20 RX ADMIN — LISINOPRIL 10 MILLIGRAM(S): 2.5 TABLET ORAL at 05:43

## 2017-07-20 RX ADMIN — Medication 1: at 17:22

## 2017-07-20 RX ADMIN — Medication 81 MILLIGRAM(S): at 12:23

## 2017-07-20 RX ADMIN — PIPERACILLIN AND TAZOBACTAM 25 GRAM(S): 4; .5 INJECTION, POWDER, LYOPHILIZED, FOR SOLUTION INTRAVENOUS at 14:56

## 2017-07-20 RX ADMIN — HEPARIN SODIUM 5000 UNIT(S): 5000 INJECTION INTRAVENOUS; SUBCUTANEOUS at 17:27

## 2017-07-20 NOTE — PHYSICAL THERAPY INITIAL EVALUATION ADULT - ACTIVE RANGE OF MOTION EXAMINATION, REHAB EVAL
bilateral upper extremity Active ROM was WFL (within functional limits)/daria shoulder ROM 90 deg/bilateral  lower extremity Active ROM was WFL (within functional limits)

## 2017-07-20 NOTE — PHYSICAL THERAPY INITIAL EVALUATION ADULT - PERTINENT HX OF CURRENT PROBLEM, REHAB EVAL
Pt is a 84yoF hx DM, HTN sent in from [facility] for 'wound care'. As per granddaughter at bedside pt has been having sacral pain from sacral ulcer. Continued below.

## 2017-07-20 NOTE — PHYSICAL THERAPY INITIAL EVALUATION ADULT - MODALITIES TREATMENT COMMENTS
Stage IV Sacral Ulcer - 9.5cmx9.0cmx6.0cm  +Undermining 6:00-3:00 greatest depth of 6cm at 9:00 (R s/l); +Tunnel at 3:00 3.0 cm

## 2017-07-20 NOTE — PROGRESS NOTE ADULT - ASSESSMENT
stage  4 sacral  decubitus,  zosyn.  vanco  per  id,  local  wound care,  dm 2,  htn,  lisinopril  decreased  follow  bp

## 2017-07-20 NOTE — PHYSICAL THERAPY INITIAL EVALUATION ADULT - PLANNED THERAPY INTERVENTIONS, PT EVAL
gait training/bed mobility training/balance training/transfer training bed mobility training/gait training/transfer training/balance training/wound care

## 2017-07-20 NOTE — PHYSICAL THERAPY INITIAL EVALUATION ADULT - ADDITIONAL COMMENTS
+CT pelvis 7/18/17: Large ulceration in the soft tissues overlying and inferior to the sacrum and coccyx extending down to level of bone. Associated marked destruction of the lower sacrum and coccyx, consistent with osteomyelitis. Adjacent abscesses within the medial aspect of the right gluteal musculature.    Pt states she lives alone in apt with elevator. Pt states she uses a RW for ambulation. +CT pelvis 7/18/17: Large ulceration in the soft tissues overlying and inferior to the sacrum and coccyx extending down to level of bone. Associated marked destruction of the lower sacrum and coccyx, consistent with osteomyelitis. Adjacent abscesses within the medial aspect of the right gluteal musculature. As per conversation with pt's son at bedside, pt has been in hospitals/rehab since June 19. Pt lives alone in senior housing apartment. Had HHA 5 hrs 7 days/wk. Pt had been ambulating with rolling walker, had trouble getting in and out of bed or off the couch at times.     Pt states she lives alone in apt with elevator. Pt states she uses a RW for ambulation.

## 2017-07-20 NOTE — PROGRESS NOTE ADULT - ASSESSMENT
A/P:  Stage IV Sacral Pressure Ulcer  DAKINS packing w/ cling roll gauze  Abx as per ID  con't Nutrition (as tolerated)  con't Offloading   con't Pericare  [facility]/u [facility] [address] [phone]  Care as per medicine will follow w/ you  d/w attng and d/w team  Carmen Mabry PA-C [facility] [id number]

## 2017-07-20 NOTE — PROGRESS NOTE ADULT - ASSESSMENT
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 Wound culture growing Morganella morganii.  Leukocytosis secondary to sacral wound infection    Suggest: Neurology evaluation to optimize Parkinson's disease  Incision and drainage or aspiration of gluteal abscesses    I will be out until 7/24 - ID to review susceptibilities of Morganella isolate

## 2017-07-20 NOTE — PHYSICAL THERAPY INITIAL EVALUATION ADULT - MANUAL MUSCLE TESTING RESULTS, REHAB EVAL
grossly assessed due to/Strength is grossly at least 3/5 throughout grossly assessed due to/Strength is grossly at least 2/5 throughout

## 2017-07-20 NOTE — PHYSICAL THERAPY INITIAL EVALUATION ADULT - TRANSFER SAFETY CONCERNS NOTED: BED/CHAIR, REHAB EVAL
decreased weight-shifting ability/losing balance/requring manual assist to step to side for bed to chair

## 2017-07-21 DIAGNOSIS — G20 PARKINSON'S DISEASE: ICD-10-CM

## 2017-07-21 LAB
ANION GAP SERPL CALC-SCNC: 12 MMOL/L — SIGNIFICANT CHANGE UP (ref 5–17)
BASOPHILS # BLD AUTO: 0 K/UL — SIGNIFICANT CHANGE UP (ref 0–0.2)
BUN SERPL-MCNC: 16 MG/DL — SIGNIFICANT CHANGE UP (ref 7–23)
CALCIUM SERPL-MCNC: 8.8 MG/DL — SIGNIFICANT CHANGE UP (ref 8.4–10.5)
CHLORIDE SERPL-SCNC: 104 MMOL/L — SIGNIFICANT CHANGE UP (ref 96–108)
CO2 SERPL-SCNC: 20 MMOL/L — LOW (ref 22–31)
CREAT SERPL-MCNC: 0.71 MG/DL — SIGNIFICANT CHANGE UP (ref 0.5–1.3)
CULTURE RESULTS: NO GROWTH — SIGNIFICANT CHANGE UP
EOSINOPHIL # BLD AUTO: 0 K/UL — SIGNIFICANT CHANGE UP (ref 0–0.5)
EOSINOPHIL NFR BLD AUTO: 2 % — SIGNIFICANT CHANGE UP (ref 0–6)
GLUCOSE SERPL-MCNC: 229 MG/DL — HIGH (ref 70–99)
HCT VFR BLD CALC: 26.2 % — LOW (ref 34.5–45)
HGB BLD-MCNC: 8.6 G/DL — LOW (ref 11.5–15.5)
LYMPHOCYTES # BLD AUTO: 1.8 K/UL — SIGNIFICANT CHANGE UP (ref 1–3.3)
LYMPHOCYTES # BLD AUTO: 10 % — LOW (ref 13–44)
MAGNESIUM SERPL-MCNC: 1.7 MG/DL — SIGNIFICANT CHANGE UP (ref 1.6–2.6)
MCHC RBC-ENTMCNC: 28.4 PG — SIGNIFICANT CHANGE UP (ref 27–34)
MCHC RBC-ENTMCNC: 32.7 GM/DL — SIGNIFICANT CHANGE UP (ref 32–36)
MCV RBC AUTO: 86.9 FL — SIGNIFICANT CHANGE UP (ref 80–100)
MONOCYTES # BLD AUTO: 1 K/UL — HIGH (ref 0–0.9)
MONOCYTES NFR BLD AUTO: 6 % — SIGNIFICANT CHANGE UP (ref 2–14)
NEUTROPHILS # BLD AUTO: 16.6 K/UL — HIGH (ref 1.8–7.4)
NEUTROPHILS NFR BLD AUTO: 79 % — HIGH (ref 43–77)
PHOSPHATE SERPL-MCNC: 2.3 MG/DL — LOW (ref 2.5–4.5)
PLATELET # BLD AUTO: 473 K/UL — HIGH (ref 150–400)
POTASSIUM SERPL-MCNC: 4.3 MMOL/L — SIGNIFICANT CHANGE UP (ref 3.5–5.3)
POTASSIUM SERPL-SCNC: 4.3 MMOL/L — SIGNIFICANT CHANGE UP (ref 3.5–5.3)
RBC # BLD: 3.01 M/UL — LOW (ref 3.8–5.2)
RBC # FLD: 15.4 % — HIGH (ref 10.3–14.5)
SODIUM SERPL-SCNC: 136 MMOL/L — SIGNIFICANT CHANGE UP (ref 135–145)
SPECIMEN SOURCE: SIGNIFICANT CHANGE UP
WBC # BLD: 19.3 K/UL — HIGH (ref 3.8–10.5)
WBC # FLD AUTO: 19.3 K/UL — HIGH (ref 3.8–10.5)

## 2017-07-21 PROCEDURE — 99232 SBSQ HOSP IP/OBS MODERATE 35: CPT

## 2017-07-21 RX ORDER — ASCORBIC ACID 60 MG
500 TABLET,CHEWABLE ORAL DAILY
Qty: 0 | Refills: 0 | Status: DISCONTINUED | OUTPATIENT
Start: 2017-07-21 | End: 2017-07-26

## 2017-07-21 RX ADMIN — Medication 500 MILLIGRAM(S): at 10:44

## 2017-07-21 RX ADMIN — HEPARIN SODIUM 5000 UNIT(S): 5000 INJECTION INTRAVENOUS; SUBCUTANEOUS at 05:29

## 2017-07-21 RX ADMIN — PIPERACILLIN AND TAZOBACTAM 25 GRAM(S): 4; .5 INJECTION, POWDER, LYOPHILIZED, FOR SOLUTION INTRAVENOUS at 23:16

## 2017-07-21 RX ADMIN — PIPERACILLIN AND TAZOBACTAM 25 GRAM(S): 4; .5 INJECTION, POWDER, LYOPHILIZED, FOR SOLUTION INTRAVENOUS at 05:26

## 2017-07-21 RX ADMIN — Medication 1: at 17:29

## 2017-07-21 RX ADMIN — Medication 2: at 12:29

## 2017-07-21 RX ADMIN — HEPARIN SODIUM 5000 UNIT(S): 5000 INJECTION INTRAVENOUS; SUBCUTANEOUS at 17:29

## 2017-07-21 RX ADMIN — OXYCODONE AND ACETAMINOPHEN 1 TABLET(S): 5; 325 TABLET ORAL at 11:15

## 2017-07-21 RX ADMIN — CARVEDILOL PHOSPHATE 12.5 MILLIGRAM(S): 80 CAPSULE, EXTENDED RELEASE ORAL at 17:29

## 2017-07-21 RX ADMIN — OXYCODONE AND ACETAMINOPHEN 1 TABLET(S): 5; 325 TABLET ORAL at 12:15

## 2017-07-21 RX ADMIN — PIPERACILLIN AND TAZOBACTAM 25 GRAM(S): 4; .5 INJECTION, POWDER, LYOPHILIZED, FOR SOLUTION INTRAVENOUS at 14:51

## 2017-07-21 RX ADMIN — Medication 1 TABLET(S): at 10:43

## 2017-07-21 RX ADMIN — Medication 1: at 08:17

## 2017-07-21 RX ADMIN — LISINOPRIL 2.5 MILLIGRAM(S): 2.5 TABLET ORAL at 05:29

## 2017-07-21 RX ADMIN — CARVEDILOL PHOSPHATE 12.5 MILLIGRAM(S): 80 CAPSULE, EXTENDED RELEASE ORAL at 05:27

## 2017-07-21 RX ADMIN — Medication 81 MILLIGRAM(S): at 11:15

## 2017-07-21 RX ADMIN — Medication 1 APPLICATION(S): at 11:17

## 2017-07-21 RX ADMIN — Medication 250 MILLIGRAM(S): at 04:18

## 2017-07-21 NOTE — DIETITIAN INITIAL EVALUATION ADULT. - OTHER INFO
Nutrition consult received. Pt reports wt fluctuation between 180 pounds to 206 pounds. Pt states she is unsure of wt fluctuation history as she has been in and out of hospitals and rehab facilities, last wt known to pt prior to going to [facility] less than 1 months ago was 206 pounds. Noted current dosing wt is 218 pounds. Pt with much improved appetite, observed 90% po intakes of meals at visit. Per flowsheet 100% po intake noted. Pt had some diarrhea yesterday, none today thus far. No other GI distress noted. Pt missing some teeth with no dentures, but denies chewing/swallowing difficulties. NKFA. PTA took vitamin D. Pt with T2DM, states she was on Metformin at home but they switched her to Januvia at the rehab facility.

## 2017-07-21 NOTE — DIETITIAN INITIAL EVALUATION ADULT. - ENERGY NEEDS
Height: 66 inches, Weight: 218 pounds (scale error?)  BMI: 35 kg/m2 IBW: 130 pounds (+/-10%), %IBW: 168%  Pertinent Info: Per chart, 85 y/o female with PMH of T2DM, HTN, Parkinson's?, bed bound, admitted from [facility] with stage 4 sacral pressure ulcer with OM, leukocytosis 2/2 wound infection, wound care following. +1 bilateral legs edema noted at this time.

## 2017-07-21 NOTE — DIETITIAN INITIAL EVALUATION ADULT. - ORAL INTAKE PTA
Pt reports decreased appetite since 5 days ago 2/2 not feeling well, states she didn't skip any meals but was unable to finish her meals. Pt reports usual diet recall as roasted chicken, beef short ribs, boiled carrots being served at the rehab facility./fair

## 2017-07-21 NOTE — DIETITIAN INITIAL EVALUATION ADULT. - ADHERENCE
Pt with T2DM, unsure if she was on diabetic diet PTA, but states 'they didn't serve me any cakes, but gave me juices'. Diet order not provided in transfer record./fair

## 2017-07-21 NOTE — PROGRESS NOTE ADULT - ASSESSMENT
INFECTED  STAGE  4  SACRAL DECUBITUS,    ON  Zosyn  AND  VANCO  SEEN BY ID  AND  WOUND CARE   HTN,  DM   ON DVT PPX    C/C  ANEMIA,  STILL  WITH ELEVATED WBC

## 2017-07-21 NOTE — CONSULT NOTE ADULT - PROBLEM SELECTOR RECOMMENDATION 9
no indications for medications at this time  followup with neurology as outpatient no acute indications for medications at this time.  would recommend deferring dopamine type agents for now.   followup with neurology as outpatient  could consider mild parkinson's agent, ie amantadine or selegiline if no contranindications. no acute indications for medications at this time.  would recommend deferring dopamine type agents for now, unless greatly impairing rehab effors.   followup with neurology as outpatient - Dr Del Toro  could consider mild parkinson's agent, ie amantadine, selegiline, or retrial of low dose sinemet if no contraindications.

## 2017-07-21 NOTE — CONSULT NOTE ADULT - ASSESSMENT
85 yo female with history of Parkinsons disease that has too many side affects to medications 85 yo female with history of moderate Parkinsons disease but several issues with adverse reactions to dopamine agents in the past by history.  Now with wound issues, infection. 85 yo female with history of moderate Parkinsons disease, gait disorder, but several issues with adverse reactions to dopamine agents in the past by history.  Now with wound issues, infection.      hallucinations may have resulted from the combination of MAOI with dopaminergic agent.

## 2017-07-21 NOTE — CONSULT NOTE ADULT - SUBJECTIVE AND OBJECTIVE BOX
HPI:  : 84yoF hx DM, HTN sent in from [facility] for 'wound care'. per granddaughter at bedside pt has been having sacral pain   from  sacral  ulcer.,   for  past  2  months,  pt has been in and out of hospitals since then. intermittent fevers/chills/weakness. no LE numbness. no cp, sob. states wound is foul smelling.,  seen in  er.  with  high  wbc,,  foul  smelling  large  ulcer noted,  pt  seen in  er ,  house  id  called,  ct  pending. pt  has  MOLST  form,  in  chart,   DNR  confirmed  with  family     Patient has had history of Parkinsons disease for several years. Was seen by Neurology at [facility]. Was tried on Azilect and sinemet at the time which caused hallucinations so was taken off these medications. Now stiffer on left side          MEDICATIONS  (STANDING):  carvedilol 12.5 milliGRAM(s) Oral every 12 hours  aspirin enteric coated 81 milliGRAM(s) Oral daily  insulin lispro (HumaLOG) corrective regimen sliding scale   SubCutaneous three times a day before meals  dextrose 5%. 1000 milliLiter(s) (50 mL/Hr) IV Continuous <Continuous>  dextrose 50% Injectable 12.5 Gram(s) IV Push once  dextrose 50% Injectable 25 Gram(s) IV Push once  dextrose 50% Injectable 25 Gram(s) IV Push once  heparin  Injectable 5000 Unit(s) SubCutaneous every 12 hours  piperacillin/tazobactam IVPB. 3.375 Gram(s) IV Intermittent every 8 hours  lisinopril 2.5 milliGRAM(s) Oral daily  Dakins Solution - 1/4 Strength 1 Application(s) Topical daily  multivitamin 1 Tablet(s) Oral daily  ascorbic acid 500 milliGRAM(s) Oral daily    MEDICATIONS  (PRN):  dextrose Gel 1 Dose(s) Oral once PRN Blood Glucose LESS THAN 70 milliGRAM(s)/deciliter  glucagon  Injectable 1 milliGRAM(s) IntraMuscular once PRN Glucose LESS THAN 70 milligrams/deciliter  oxyCODONE    5 mG/acetaminophen 325 mG 1 Tablet(s) Oral every 12 hours PRN Moderate Pain (4 - 6)    PAST MEDICAL & SURGICAL HISTORY:  No pertinent past medical history  No significant past surgical history    FAMILY HISTORY:  No pertinent family history in first degree relatives    Allergies    Sinemet (Unknown)    Intolerances        SHx - No smoking, No ETOH, No drug abuse      Review of Systems:  CONSTITUTIONAL:  No weight loss, fever, chills, weakness or fatigue.  HEENT:  Eyes:  No visual loss, blurred vision, double vision or yellow sclerae. Ears, Nose, Throat:  No hearing loss, sneezing, congestion, runny nose or sore throat.  SKIN:  No rash or itching.  CARDIOVASCULAR:  No chest pain, chest pressure or chest discomfort. No palpitations or edema.  RESPIRATORY:  No shortness of breath, cough or sputum.  GASTROINTESTINAL:  No anorexia, nausea, vomiting or diarrhea. No abdominal pain or blood.  GENITOURINARY:  NO Burning on urination.   NEUROLOGICAL: See HPI  MUSCULOSKELETAL:  No muscle, back pain, joint pain or stiffness.  HEMATOLOGIC:  No anemia, bleeding or bruising.  LYMPHATICS:  No enlarged nodes. No history of splenectomy.  PSYCHIATRIC:  No history of depression or anxiety.  ENDOCRINOLOGIC:  No reports of sweating, cold or heat intolerance. No polyuria or polydipsia.  ALLERGIES:  No history of asthma, hives, eczema or rhinitis.        Vital Signs Last 24 Hrs  T(C): 36.9 (21 Jul 2017 04:21), Max: 36.9 (21 Jul 2017 04:21)  T(F): 98.4 (21 Jul 2017 04:21), Max: 98.4 (21 Jul 2017 04:21)  HR: 64 (21 Jul 2017 04:21) (64 - 68)  BP: 120/64 (21 Jul 2017 04:21) (110/66 - 120/64)  BP(mean): --  RR: 18 (21 Jul 2017 04:21) (18 - 18)  SpO2: 99% (21 Jul 2017 04:21) (98% - 99%)    General Exam:   General appearance: No acute distress                   Neurological Exam:  Mental Status: Orientated to self, date and place.  Attention intact.  No dysarthria, aphasia or neglect.  Knowledge intact.  Registration intact.  Short and long term memory grossly intact.      Cranial Nerves: CN I - not tested.  PERRL, EOMI, VFF, no nystagmus or diplopia.  No APD.  Fundi not visualized bilaterally.  CN V1-3 intact to light touch and pinprick.  No facial asymmetry.  Hearing intact to finger rub bilaterally.  Tongue, uvula and palate midline.  Sternocleidomastoid and Trapezius intact bilaterally. masked facies    Motor:   Tone: rigidity L>R                 Strength:     [] Upper extremity                      Delt       Bicep    Tricep                                                  R         5/5 5/5 5/5 5/5                                               L          5/5 5/5 5/5 5/5  [] Lower extremity                       HF          KE          KF        DF         PF                                               R        5/5 5/5 5/5 5/5 5/5                                               L         5/5 5/5 5/5 5/5 5/5  Pronator drift: none                 Dysmetria: None to finger-nose-finger or heel-shin-heel  No truncal ataxia.    Tremor: No resting, postural or action tremor.  No myoclonus.    Sensation: intact to light touch, pinprick, vibration and proprioception    Deep Tendon Reflexes: 1+ bilateral biceps, triceps, brachioradialis, knee and ankle  Toes flexor bilaterally    Other:    07-21    136  |  104  |  16  ----------------------------<  229<H>  4.3   |  20<L>  |  0.71    Ca    8.8      21 Jul 2017 10:53  Phos  2.3     07-21  Mg     1.7     07-21 07-21    136  |  104  |  16  ----------------------------<  229<H>  4.3   |  20<L>  |  0.71    Ca    8.8      21 Jul 2017 10:53  Phos  2.3     07-21  Mg     1.7     07-21                            8.6    19.3  )-----------( 473      ( 21 Jul 2017 10:53 )             26.2 HPI:  : 84yoF hx DM, HTN sent in from [facility] for 'wound care'. per granddaughter at bedside pt has been having sacral pain   from  sacral  ulcer.,   for  past  2  months,  pt has been in and out of hospitals since then. intermittent fevers/chills/weakness. no LE numbness. no cp, sob. states wound is foul smelling.     Patient has had history of Parkinsons disease for several years. Was seen by Neurology at [facility]. Was tried on Azilect and sinemet at the time which caused hallucinations so was taken off these medications. Now stiffer on left side          MEDICATIONS  (STANDING):  carvedilol 12.5 milliGRAM(s) Oral every 12 hours  aspirin enteric coated 81 milliGRAM(s) Oral daily  insulin lispro (HumaLOG) corrective regimen sliding scale   SubCutaneous three times a day before meals  dextrose 5%. 1000 milliLiter(s) (50 mL/Hr) IV Continuous <Continuous>  dextrose 50% Injectable 12.5 Gram(s) IV Push once  dextrose 50% Injectable 25 Gram(s) IV Push once  dextrose 50% Injectable 25 Gram(s) IV Push once  heparin  Injectable 5000 Unit(s) SubCutaneous every 12 hours  piperacillin/tazobactam IVPB. 3.375 Gram(s) IV Intermittent every 8 hours  lisinopril 2.5 milliGRAM(s) Oral daily  Dakins Solution - 1/4 Strength 1 Application(s) Topical daily  multivitamin 1 Tablet(s) Oral daily  ascorbic acid 500 milliGRAM(s) Oral daily    MEDICATIONS  (PRN):  dextrose Gel 1 Dose(s) Oral once PRN Blood Glucose LESS THAN 70 milliGRAM(s)/deciliter  glucagon  Injectable 1 milliGRAM(s) IntraMuscular once PRN Glucose LESS THAN 70 milligrams/deciliter  oxyCODONE    5 mG/acetaminophen 325 mG 1 Tablet(s) Oral every 12 hours PRN Moderate Pain (4 - 6)    PAST MEDICAL & SURGICAL HISTORY:  No pertinent past medical history  No significant past surgical history    FAMILY HISTORY:  No pertinent family history in first degree relatives    Allergies    Sinemet (Unknown)    Intolerances        SHx - No smoking, No ETOH, No drug abuse      Review of Systems:  CONSTITUTIONAL:  No weight loss, fever, chills, weakness or fatigue.  HEENT:  Eyes:  No visual loss, blurred vision, double vision or yellow sclerae. Ears, Nose, Throat:  No hearing loss, sneezing, congestion, runny nose or sore throat.  SKIN:  No rash or itching.  CARDIOVASCULAR:  No chest pain, chest pressure or chest discomfort. No palpitations or edema.  RESPIRATORY:  No shortness of breath, cough or sputum.  GASTROINTESTINAL:  No anorexia, nausea, vomiting or diarrhea. No abdominal pain or blood.  GENITOURINARY:  NO Burning on urination.   NEUROLOGICAL: See HPI  MUSCULOSKELETAL:  No muscle, back pain, joint pain or stiffness.  HEMATOLOGIC:  No anemia, bleeding or bruising.  LYMPHATICS:  No enlarged nodes. No history of splenectomy.  PSYCHIATRIC:  No history of depression or anxiety.  ENDOCRINOLOGIC:  No reports of sweating, cold or heat intolerance. No polyuria or polydipsia.  ALLERGIES:  No history of asthma, hives, eczema or rhinitis.        Vital Signs Last 24 Hrs  T(C): 36.9 (21 Jul 2017 04:21), Max: 36.9 (21 Jul 2017 04:21)  T(F): 98.4 (21 Jul 2017 04:21), Max: 98.4 (21 Jul 2017 04:21)  HR: 64 (21 Jul 2017 04:21) (64 - 68)  BP: 120/64 (21 Jul 2017 04:21) (110/66 - 120/64)  BP(mean): --  RR: 18 (21 Jul 2017 04:21) (18 - 18)  SpO2: 99% (21 Jul 2017 04:21) (98% - 99%)    General Exam:   General appearance: No acute distress                   Neurological Exam:  Mental Status: Orientated to self, date and place.  Attention intact.  No dysarthria, aphasia or neglect.  Knowledge intact.  Registration intact.  Short and long term memory grossly intact.      Cranial Nerves: CN I - not tested.  PERRL, EOMI, VFF, no nystagmus or diplopia.  No APD.  Fundi not visualized bilaterally.  CN V1-3 intact to light touch and pinprick.  No facial asymmetry.  Hearing intact to finger rub bilaterally.  Tongue, uvula and palate midline.  Sternocleidomastoid and Trapezius intact bilaterally. masked facies    Motor:   Tone: rigidity L>R                 Strength:     [] Upper extremity                      Delt       Bicep    Tricep                                                  R         5/5        5/5        5/5       5/5                                               L          5/5        5/5        5/5       5/5  [] Lower extremity                       HF          KE          KF        DF         PF                                               R        5/5 5/5 5/5 5/5 5/5                                               L         5/5 5/5 5/5 5/5 5/5  Pronator drift: none                 Dysmetria: None to finger-nose-finger or heel-shin-heel  No truncal ataxia.    Tremor: No resting, postural or action tremor.  No myoclonus.    Sensation: intact to light touch, pinprick, vibration and proprioception    Deep Tendon Reflexes: 1+ bilateral biceps, triceps, brachioradialis, knee and ankle  Toes flexor bilaterally    Other:    07-21    136  |  104  |  16  ----------------------------<  229<H>  4.3   |  20<L>  |  0.71    Ca    8.8      21 Jul 2017 10:53  Phos  2.3     07-21  Mg     1.7     07-21 07-21    136  |  104  |  16  ----------------------------<  229<H>  4.3   |  20<L>  |  0.71    Ca    8.8      21 Jul 2017 10:53  Phos  2.3     07-21  Mg     1.7     07-21                            8.6    19.3  )-----------( 473      ( 21 Jul 2017 10:53 )             26.2 HPI:  : 84yoF hx DM, HTN sent in from [facility] for 'wound care'. per granddaughter at bedside pt has been having sacral pain   from  sacral  ulcer.,   for  past  2  months,  pt has been in and out of hospitals since then. intermittent fevers/chills/weakness. no LE numbness. no cp, sob. states wound is foul smelling.     Patient has had history of Parkinsons disease for several years. Was seen by Neurology at [facility]. Was tried on Azilect and sinemet at the time which caused hallucinations so was taken off these medications. Now stiffer on left side    MEDICATIONS  (STANDING):  carvedilol 12.5 milliGRAM(s) Oral every 12 hours  aspirin enteric coated 81 milliGRAM(s) Oral daily  insulin lispro (HumaLOG) corrective regimen sliding scale   SubCutaneous three times a day before meals  dextrose 5%. 1000 milliLiter(s) (50 mL/Hr) IV Continuous <Continuous>  heparin  Injectable 5000 Unit(s) SubCutaneous every 12 hours  piperacillin/tazobactam IVPB. 3.375 Gram(s) IV Intermittent every 8 hours  lisinopril 2.5 milliGRAM(s) Oral daily  Dakins Solution - 1/4 Strength 1 Application(s) Topical daily  multivitamin 1 Tablet(s) Oral daily  ascorbic acid 500 milliGRAM(s) Oral daily    MEDICATIONS  (PRN):  dextrose Gel 1 Dose(s) Oral once PRN Blood Glucose LESS THAN 70 milliGRAM(s)/deciliter  glucagon  Injectable 1 milliGRAM(s) IntraMuscular once PRN Glucose LESS THAN 70 milligrams/deciliter  oxyCODONE    5 mG/acetaminophen 325 mG 1 Tablet(s) Oral every 12 hours PRN Moderate Pain (4 - 6)    PAST MEDICAL & SURGICAL HISTORY:  No pertinent past medical history. No significant past surgical history    FAMILY HISTORY:  No pertinent family history in first degree relatives    Allergies  Sinemet (Unknown)  Intolerances    SHx - No smoking, No ETOH, No drug abuse    Review of Systems:  CONSTITUTIONAL:  No weight loss, fever, chills, weakness or fatigue.  HEENT:  Eyes:  No visual loss, blurred vision, double vision or yellow sclerae. Ears, Nose, Throat:  No hearing loss, sneezing, congestion, runny nose or sore throat.  SKIN:  No rash or itching.  CARDIOVASCULAR:  No chest pain, chest pressure or chest discomfort. No palpitations or edema.  RESPIRATORY:  No shortness of breath, cough or sputum.  GASTROINTESTINAL:  No anorexia, nausea, vomiting or diarrhea. No abdominal pain or blood.  GENITOURINARY:  NO Burning on urination.   NEUROLOGICAL: See HPI  MUSCULOSKELETAL:  No muscle, back pain, joint pain or stiffness.  HEMATOLOGIC:  No anemia, bleeding or bruising.  LYMPHATICS:  No enlarged nodes. No history of splenectomy.  PSYCHIATRIC:  No history of depression or anxiety.  ENDOCRINOLOGIC:  No reports of sweating, cold or heat intolerance. No polyuria or polydipsia.  ALLERGIES:  No history of asthma, hives, eczema or rhinitis.    Vital Signs Last 24 Hrs  T(C): 36.9 (21 Jul 2017 04:21), Max: 36.9 (21 Jul 2017 04:21)  T(F): 98.4 (21 Jul 2017 04:21), Max: 98.4 (21 Jul 2017 04:21)  HR: 64 (21 Jul 2017 04:21) (64 - 68)  BP: 120/64 (21 Jul 2017 04:21) (110/66 - 120/64)  RR: 18 (21 Jul 2017 04:21) (18 - 18)  SpO2: 99% (21 Jul 2017 04:21) (98% - 99%)    General Exam:   General appearance: No acute distress                 Neurological Exam:  Mental Status: Orientated to self, date and place.  Attention intact.  No dysarthria, aphasia or neglect.  Knowledge intact.  Registration intact.  Short and long term memory grossly intact.    Cranial Nerves: CN I - not tested.  PERRL, EOMI, VFF, no nystagmus or diplopia.  No APD.  Fundi not visualized bilaterally.  CN V1-3 intact to light touch and pinprick.  No facial asymmetry.  Hearing intact to finger rub bilaterally.  Tongue, uvula and palate midline.  Sternocleidomastoid and Trapezius intact bilaterally. masked facies  Motor:   Tone: rigidity L>R                 Strength:     [] Upper extremity                      Delt       Bicep    Tricep                                                  R         5/5        5/5        5/5       5/5                                               L          5/5        5/5        5/5       5/5  [] Lower extremity                       HF          KE          KF        DF         PF                                               R        5/5        5/5        5/5       5/5       5/5                                               L         5/5        5/5       5/5       5/5        5/5  Pronator drift: none                 Dysmetria: None to finger-nose-finger or heel-shin-heel  No truncal ataxia.    Tremor: No resting, postural or action tremor.  No myoclonus.  Sensation: intact to light touch, pinprick, vibration and proprioception  Deep Tendon Reflexes: 1+ bilateral biceps, triceps, brachioradialis, knee and ankle  Toes flexor bilaterally    Other:    07-21    136  |  104  |  16  ----------------------------<  229<H>  4.3   |  20<L>  |  0.71    Ca    8.8      21 Jul 2017 10:53  Phos  2.3     07-21  Mg     1.7     07-21                          8.6    19.3  )-----------( 473      ( 21 Jul 2017 10:53 )             26.2 HPI:  : 84yoF hx DM, HTN sent in from [facility] for 'wound care'. per granddaughter at bedside pt has been having sacral pain   from  sacral  ulcer.,   for  past  2  months,  pt has been in and out of hospitals since then. intermittent fevers/chills/weakness. no LE numbness. no cp, sob. states wound is foul smelling.     Patient has had history of Parkinsons disease for several years. Was seen by Neurology at [facility]. Was tried on Azilect and sinemet at the time which caused hallucinations so was taken off these medications. Stiffer on left side    MEDICATIONS  (STANDING):  carvedilol 12.5 milliGRAM(s) Oral every 12 hours  aspirin enteric coated 81 milliGRAM(s) Oral daily  insulin lispro (HumaLOG) corrective regimen sliding scale   SubCutaneous three times a day before meals  dextrose 5%. 1000 milliLiter(s) (50 mL/Hr) IV Continuous <Continuous>  heparin  Injectable 5000 Unit(s) SubCutaneous every 12 hours  piperacillin/tazobactam IVPB. 3.375 Gram(s) IV Intermittent every 8 hours  lisinopril 2.5 milliGRAM(s) Oral daily  Dakins Solution - 1/4 Strength 1 Application(s) Topical daily  multivitamin 1 Tablet(s) Oral daily  ascorbic acid 500 milliGRAM(s) Oral daily    MEDICATIONS  (PRN):  dextrose Gel 1 Dose(s) Oral once PRN Blood Glucose LESS THAN 70 milliGRAM(s)/deciliter  glucagon  Injectable 1 milliGRAM(s) IntraMuscular once PRN Glucose LESS THAN 70 milligrams/deciliter  oxyCODONE    5 mG/acetaminophen 325 mG 1 Tablet(s) Oral every 12 hours PRN Moderate Pain (4 - 6)    PAST MEDICAL & SURGICAL HISTORY:  No pertinent past medical history. No significant past surgical history    FAMILY HISTORY:  No pertinent family history in first degree relatives    Allergies  Sinemet (Unknown)  Intolerances    SHx - No smoking, No ETOH, No drug abuse    Review of Systems:  CONSTITUTIONAL:  No weight loss, fever, chills, weakness or fatigue.  HEENT:  Eyes:  No visual loss, blurred vision, double vision or yellow sclerae. Ears, Nose, Throat:  No hearing loss, sneezing, congestion, runny nose or sore throat.  SKIN:  No rash or itching.  CARDIOVASCULAR:  No chest pain, chest pressure or chest discomfort. No palpitations or edema.  RESPIRATORY:  No shortness of breath, cough or sputum.  GASTROINTESTINAL:  No anorexia, nausea, vomiting or diarrhea. No abdominal pain or blood.  GENITOURINARY:  NO Burning on urination.   NEUROLOGICAL: See HPI  MUSCULOSKELETAL:  No muscle, back pain, joint pain or stiffness.  HEMATOLOGIC:  No anemia, bleeding or bruising.  LYMPHATICS:  No enlarged nodes. No history of splenectomy.  PSYCHIATRIC:  No history of depression or anxiety.  ENDOCRINOLOGIC:  No reports of sweating, cold or heat intolerance. No polyuria or polydipsia.  ALLERGIES:  No history of asthma, hives, eczema or rhinitis.    Vital Signs Last 24 Hrs  T(C): 36.9 (21 Jul 2017 04:21), Max: 36.9 (21 Jul 2017 04:21)  T(F): 98.4 (21 Jul 2017 04:21), Max: 98.4 (21 Jul 2017 04:21)  HR: 64 (21 Jul 2017 04:21) (64 - 68)  BP: 120/64 (21 Jul 2017 04:21) (110/66 - 120/64)  RR: 18 (21 Jul 2017 04:21) (18 - 18)  SpO2: 99% (21 Jul 2017 04:21) (98% - 99%)    General Exam:   General appearance: No acute distress                 Neurological Exam:  Mental Status: Orientated to self, date and place.  Attention intact.  No dysarthria, aphasia or neglect.  Knowledge intact.  Registration intact.  Short and long term memory grossly intact.    Cranial Nerves: CN I - not tested.  PERRL, EOMI, VFF, no nystagmus or diplopia.  No APD.  Fundi not visualized bilaterally.  CN V1-3 intact to light touch and pinprick.  No facial asymmetry.  Hearing intact to finger rub bilaterally.  Tongue, uvula and palate midline.  Sternocleidomastoid and Trapezius intact bilaterally. masked facies  Motor:   Tone: rigidity L>R                 Strength:     [] Upper extremity                      Delt       Bicep    Tricep                                                  R         5/5        5/5        5/5       5/5                                               L          5/5        5/5        5/5       5/5  [] Lower extremity                       HF          KE          KF        DF         PF                                               R        5/5        5/5        5/5       5/5       5/5                                               L         5/5        5/5       5/5       5/5        5/5  Pronator drift: none                 Dysmetria: None to finger-nose-finger or heel-shin-heel  No truncal ataxia.    Tremor: No resting, postural or action tremor.  No myoclonus.  Sensation: intact to light touch, pinprick, vibration and proprioception  Deep Tendon Reflexes: 1+ bilateral biceps, triceps, brachioradialis, knee and ankle  Toes flexor bilaterally    Other:    07-21    136  |  104  |  16  ----------------------------<  229<H>  4.3   |  20<L>  |  0.71    Ca    8.8      21 Jul 2017 10:53  Phos  2.3     07-21  Mg     1.7     07-21                          8.6    19.3  )-----------( 473      ( 21 Jul 2017 10:53 )             26.2

## 2017-07-21 NOTE — PROGRESS NOTE ADULT - ASSESSMENT
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 Wound culture growing Morganella morganii.  Some resistance noted.  Leukocytosis secondary to sacral wound infection.  Soft stools, but not watery.      Suggest:   1)Neurology evaluation to optimize Parkinson's disease as per Dr. Yates's note.  2) Incision and drainage or aspiration of gluteal abscesses - surg eval  3) Continue zosyn for morganella.  D/c vanco IV.  4) Please call ID service this weekend with questions.  ID available if needed. Otherwise to be seen Monday 7/24 by Dr. Yates.     Hoa Marcano MD  [phone] (pager)  [phone] (office)

## 2017-07-22 LAB
BASOPHILS # BLD AUTO: 0.04 K/UL — SIGNIFICANT CHANGE UP (ref 0–0.2)
BASOPHILS NFR BLD AUTO: 0.2 % — SIGNIFICANT CHANGE UP (ref 0–2)
EOSINOPHIL # BLD AUTO: 0.24 K/UL — SIGNIFICANT CHANGE UP (ref 0–0.5)
EOSINOPHIL NFR BLD AUTO: 1.3 % — SIGNIFICANT CHANGE UP (ref 0–6)
HCT VFR BLD CALC: 24.5 % — LOW (ref 34.5–45)
HGB BLD-MCNC: 8.1 G/DL — LOW (ref 11.5–15.5)
IMM GRANULOCYTES NFR BLD AUTO: 1.3 % — SIGNIFICANT CHANGE UP (ref 0–1.5)
LYMPHOCYTES # BLD AUTO: 1.5 K/UL — SIGNIFICANT CHANGE UP (ref 1–3.3)
LYMPHOCYTES # BLD AUTO: 8.1 % — LOW (ref 13–44)
MCHC RBC-ENTMCNC: 26.4 PG — LOW (ref 27–34)
MCHC RBC-ENTMCNC: 33.1 GM/DL — SIGNIFICANT CHANGE UP (ref 32–36)
MCV RBC AUTO: 79.8 FL — LOW (ref 80–100)
MONOCYTES # BLD AUTO: 1.32 K/UL — HIGH (ref 0–0.9)
MONOCYTES NFR BLD AUTO: 7.1 % — SIGNIFICANT CHANGE UP (ref 2–14)
NEUTROPHILS # BLD AUTO: 15.16 K/UL — HIGH (ref 1.8–7.4)
NEUTROPHILS NFR BLD AUTO: 82 % — HIGH (ref 43–77)
PLATELET # BLD AUTO: 619 K/UL — HIGH (ref 150–400)
RBC # BLD: 3.07 M/UL — LOW (ref 3.8–5.2)
RBC # FLD: 16.5 % — HIGH (ref 10.3–14.5)
WBC # BLD: 18.5 K/UL — HIGH (ref 3.8–10.5)
WBC # FLD AUTO: 18.5 K/UL — HIGH (ref 3.8–10.5)

## 2017-07-22 PROCEDURE — 99222 1ST HOSP IP/OBS MODERATE 55: CPT

## 2017-07-22 RX ADMIN — Medication 1: at 08:39

## 2017-07-22 RX ADMIN — PIPERACILLIN AND TAZOBACTAM 25 GRAM(S): 4; .5 INJECTION, POWDER, LYOPHILIZED, FOR SOLUTION INTRAVENOUS at 14:25

## 2017-07-22 RX ADMIN — CARVEDILOL PHOSPHATE 12.5 MILLIGRAM(S): 80 CAPSULE, EXTENDED RELEASE ORAL at 06:00

## 2017-07-22 RX ADMIN — PIPERACILLIN AND TAZOBACTAM 25 GRAM(S): 4; .5 INJECTION, POWDER, LYOPHILIZED, FOR SOLUTION INTRAVENOUS at 05:54

## 2017-07-22 RX ADMIN — Medication 81 MILLIGRAM(S): at 11:47

## 2017-07-22 RX ADMIN — HEPARIN SODIUM 5000 UNIT(S): 5000 INJECTION INTRAVENOUS; SUBCUTANEOUS at 17:27

## 2017-07-22 RX ADMIN — Medication 500 MILLIGRAM(S): at 11:47

## 2017-07-22 RX ADMIN — Medication 2: at 12:33

## 2017-07-22 RX ADMIN — Medication 1 TABLET(S): at 11:47

## 2017-07-22 RX ADMIN — PIPERACILLIN AND TAZOBACTAM 25 GRAM(S): 4; .5 INJECTION, POWDER, LYOPHILIZED, FOR SOLUTION INTRAVENOUS at 21:08

## 2017-07-22 RX ADMIN — Medication 1 APPLICATION(S): at 11:48

## 2017-07-22 RX ADMIN — HEPARIN SODIUM 5000 UNIT(S): 5000 INJECTION INTRAVENOUS; SUBCUTANEOUS at 06:00

## 2017-07-22 RX ADMIN — LISINOPRIL 2.5 MILLIGRAM(S): 2.5 TABLET ORAL at 06:00

## 2017-07-22 RX ADMIN — Medication 1: at 17:27

## 2017-07-22 RX ADMIN — CARVEDILOL PHOSPHATE 12.5 MILLIGRAM(S): 80 CAPSULE, EXTENDED RELEASE ORAL at 17:27

## 2017-07-22 NOTE — PROGRESS NOTE ADULT - ASSESSMENT
STAGE  4  SACRAL  DECUBITUS, LOCAL  WOUND CARE   PT BEDBOUND,  OBESITY, ON  ZOSYN, PER  ID    SEEN BY NEURO, NO NEED  FOR  MEDS   ON  DVT  PPX

## 2017-07-23 LAB
CULTURE RESULTS: SIGNIFICANT CHANGE UP
CULTURE RESULTS: SIGNIFICANT CHANGE UP
SPECIMEN SOURCE: SIGNIFICANT CHANGE UP
SPECIMEN SOURCE: SIGNIFICANT CHANGE UP

## 2017-07-23 RX ADMIN — PIPERACILLIN AND TAZOBACTAM 25 GRAM(S): 4; .5 INJECTION, POWDER, LYOPHILIZED, FOR SOLUTION INTRAVENOUS at 14:21

## 2017-07-23 RX ADMIN — LISINOPRIL 2.5 MILLIGRAM(S): 2.5 TABLET ORAL at 05:24

## 2017-07-23 RX ADMIN — Medication 81 MILLIGRAM(S): at 12:04

## 2017-07-23 RX ADMIN — HEPARIN SODIUM 5000 UNIT(S): 5000 INJECTION INTRAVENOUS; SUBCUTANEOUS at 17:19

## 2017-07-23 RX ADMIN — Medication 1 APPLICATION(S): at 12:04

## 2017-07-23 RX ADMIN — PIPERACILLIN AND TAZOBACTAM 25 GRAM(S): 4; .5 INJECTION, POWDER, LYOPHILIZED, FOR SOLUTION INTRAVENOUS at 05:24

## 2017-07-23 RX ADMIN — CARVEDILOL PHOSPHATE 12.5 MILLIGRAM(S): 80 CAPSULE, EXTENDED RELEASE ORAL at 17:19

## 2017-07-23 RX ADMIN — Medication 2: at 12:48

## 2017-07-23 RX ADMIN — Medication 1: at 08:14

## 2017-07-23 RX ADMIN — OXYCODONE AND ACETAMINOPHEN 1 TABLET(S): 5; 325 TABLET ORAL at 08:45

## 2017-07-23 RX ADMIN — PIPERACILLIN AND TAZOBACTAM 25 GRAM(S): 4; .5 INJECTION, POWDER, LYOPHILIZED, FOR SOLUTION INTRAVENOUS at 21:10

## 2017-07-23 RX ADMIN — Medication 1 TABLET(S): at 12:04

## 2017-07-23 RX ADMIN — CARVEDILOL PHOSPHATE 12.5 MILLIGRAM(S): 80 CAPSULE, EXTENDED RELEASE ORAL at 05:24

## 2017-07-23 RX ADMIN — Medication 500 MILLIGRAM(S): at 12:04

## 2017-07-23 RX ADMIN — HEPARIN SODIUM 5000 UNIT(S): 5000 INJECTION INTRAVENOUS; SUBCUTANEOUS at 05:24

## 2017-07-23 RX ADMIN — OXYCODONE AND ACETAMINOPHEN 1 TABLET(S): 5; 325 TABLET ORAL at 08:15

## 2017-07-23 NOTE — PROGRESS NOTE ADULT - ASSESSMENT
C/ ANEMIA,  BED  BOUND,    FUNCTIONAL PARAPLEGIA    BUSTAMANTE TO  PROMOTE HEALING   LOCAL WOUNDCARE    WBC Deceasing   WOUND  WITH Morganella  ZOSYN.   ID  TO  DEFINE  DURATION,

## 2017-07-24 PROCEDURE — 99232 SBSQ HOSP IP/OBS MODERATE 35: CPT

## 2017-07-24 RX ORDER — ERTAPENEM SODIUM 1 G/1
INJECTION, POWDER, LYOPHILIZED, FOR SOLUTION INTRAMUSCULAR; INTRAVENOUS
Qty: 0 | Refills: 0 | Status: DISCONTINUED | OUTPATIENT
Start: 2017-07-24 | End: 2017-07-24

## 2017-07-24 RX ORDER — ERTAPENEM SODIUM 1 G/1
1000 INJECTION, POWDER, LYOPHILIZED, FOR SOLUTION INTRAMUSCULAR; INTRAVENOUS EVERY 24 HOURS
Qty: 0 | Refills: 0 | Status: DISCONTINUED | OUTPATIENT
Start: 2017-07-25 | End: 2017-07-25

## 2017-07-24 RX ADMIN — PIPERACILLIN AND TAZOBACTAM 25 GRAM(S): 4; .5 INJECTION, POWDER, LYOPHILIZED, FOR SOLUTION INTRAVENOUS at 23:18

## 2017-07-24 RX ADMIN — CARVEDILOL PHOSPHATE 12.5 MILLIGRAM(S): 80 CAPSULE, EXTENDED RELEASE ORAL at 06:00

## 2017-07-24 RX ADMIN — LISINOPRIL 2.5 MILLIGRAM(S): 2.5 TABLET ORAL at 06:00

## 2017-07-24 RX ADMIN — HEPARIN SODIUM 5000 UNIT(S): 5000 INJECTION INTRAVENOUS; SUBCUTANEOUS at 17:28

## 2017-07-24 RX ADMIN — Medication 81 MILLIGRAM(S): at 11:58

## 2017-07-24 RX ADMIN — CARVEDILOL PHOSPHATE 12.5 MILLIGRAM(S): 80 CAPSULE, EXTENDED RELEASE ORAL at 17:28

## 2017-07-24 RX ADMIN — Medication 1: at 12:38

## 2017-07-24 RX ADMIN — PIPERACILLIN AND TAZOBACTAM 25 GRAM(S): 4; .5 INJECTION, POWDER, LYOPHILIZED, FOR SOLUTION INTRAVENOUS at 06:00

## 2017-07-24 RX ADMIN — Medication 2: at 17:28

## 2017-07-24 RX ADMIN — Medication 1 APPLICATION(S): at 11:58

## 2017-07-24 RX ADMIN — HEPARIN SODIUM 5000 UNIT(S): 5000 INJECTION INTRAVENOUS; SUBCUTANEOUS at 06:00

## 2017-07-24 RX ADMIN — Medication 500 MILLIGRAM(S): at 11:58

## 2017-07-24 RX ADMIN — Medication 1 TABLET(S): at 11:58

## 2017-07-24 RX ADMIN — PIPERACILLIN AND TAZOBACTAM 25 GRAM(S): 4; .5 INJECTION, POWDER, LYOPHILIZED, FOR SOLUTION INTRAVENOUS at 15:18

## 2017-07-24 RX ADMIN — Medication 1: at 08:24

## 2017-07-24 RX ADMIN — OXYCODONE AND ACETAMINOPHEN 1 TABLET(S): 5; 325 TABLET ORAL at 15:36

## 2017-07-24 RX ADMIN — OXYCODONE AND ACETAMINOPHEN 1 TABLET(S): 5; 325 TABLET ORAL at 16:00

## 2017-07-24 NOTE — PROGRESS NOTE ADULT - ASSESSMENT
stage  4  sacral  decubitus,   on  ab,  per  id    clean  wound , on local wound   care    ?  stop  ab, defer to  id

## 2017-07-24 NOTE — CHART NOTE - NSCHARTNOTEFT_GEN_A_CORE
Discussed pt with ID MD, Dr. Yates. PICC Line to be placed this evening. Pt to have last dose of Zosyn in am of 7/25. She will then start Ertapenem 1 gm QD in am of 7/25 - can start at 10 am. To continue for 7 days. Pt to be d/cd to Rehab tomorrow.

## 2017-07-24 NOTE — PROGRESS NOTE ADULT - ASSESSMENT
Wound improved, patient subjectively improved. Persistent leukocytosis noted    Suggest:   IR evaluation for drainage of abscesses  continue IV antibiotics (on Zosyn since 7/18) through 7/31  if leukocytosis improved.  Consider PICC line and discharge with Ertapenem 1 gram ivss daily  -discussed with NP

## 2017-07-25 LAB
BASOPHILS # BLD AUTO: 0.03 K/UL — SIGNIFICANT CHANGE UP (ref 0–0.2)
BASOPHILS NFR BLD AUTO: 0.2 % — SIGNIFICANT CHANGE UP (ref 0–2)
EOSINOPHIL # BLD AUTO: 0.44 K/UL — SIGNIFICANT CHANGE UP (ref 0–0.5)
EOSINOPHIL NFR BLD AUTO: 2.9 % — SIGNIFICANT CHANGE UP (ref 0–6)
HCT VFR BLD CALC: 24.6 % — LOW (ref 34.5–45)
HCT VFR BLD CALC: 30.3 % — LOW (ref 34.5–45)
HGB BLD-MCNC: 7.7 G/DL — LOW (ref 11.5–15.5)
HGB BLD-MCNC: 9.8 G/DL — LOW (ref 11.5–15.5)
IMM GRANULOCYTES NFR BLD AUTO: 0.9 % — SIGNIFICANT CHANGE UP (ref 0–1.5)
LYMPHOCYTES # BLD AUTO: 1.7 K/UL — SIGNIFICANT CHANGE UP (ref 1–3.3)
LYMPHOCYTES # BLD AUTO: 11.2 % — LOW (ref 13–44)
MAGNESIUM SERPL-MCNC: 1.4 MG/DL — LOW (ref 1.6–2.6)
MCHC RBC-ENTMCNC: 25.6 PG — LOW (ref 27–34)
MCHC RBC-ENTMCNC: 28.5 PG — SIGNIFICANT CHANGE UP (ref 27–34)
MCHC RBC-ENTMCNC: 31.3 GM/DL — LOW (ref 32–36)
MCHC RBC-ENTMCNC: 32.2 GM/DL — SIGNIFICANT CHANGE UP (ref 32–36)
MCV RBC AUTO: 81.7 FL — SIGNIFICANT CHANGE UP (ref 80–100)
MCV RBC AUTO: 88.6 FL — SIGNIFICANT CHANGE UP (ref 80–100)
MONOCYTES # BLD AUTO: 1.13 K/UL — HIGH (ref 0–0.9)
MONOCYTES NFR BLD AUTO: 7.4 % — SIGNIFICANT CHANGE UP (ref 2–14)
NEUTROPHILS # BLD AUTO: 11.73 K/UL — HIGH (ref 1.8–7.4)
NEUTROPHILS NFR BLD AUTO: 77.4 % — HIGH (ref 43–77)
PHOSPHATE SERPL-MCNC: 2.4 MG/DL — LOW (ref 2.5–4.5)
PLATELET # BLD AUTO: 317 K/UL — SIGNIFICANT CHANGE UP (ref 150–400)
PLATELET # BLD AUTO: 554 K/UL — HIGH (ref 150–400)
RBC # BLD: 3.01 M/UL — LOW (ref 3.8–5.2)
RBC # BLD: 3.43 M/UL — LOW (ref 3.8–5.2)
RBC # FLD: 16.5 % — HIGH (ref 10.3–14.5)
RBC # FLD: 17.6 % — HIGH (ref 10.3–14.5)
WBC # BLD: 15.17 K/UL — HIGH (ref 3.8–10.5)
WBC # BLD: 16.2 K/UL — HIGH (ref 3.8–10.5)
WBC # FLD AUTO: 15.17 K/UL — HIGH (ref 3.8–10.5)
WBC # FLD AUTO: 16.2 K/UL — HIGH (ref 3.8–10.5)

## 2017-07-25 PROCEDURE — 71010: CPT | Mod: 26

## 2017-07-25 PROCEDURE — 99232 SBSQ HOSP IP/OBS MODERATE 35: CPT

## 2017-07-25 RX ORDER — POTASSIUM PHOSPHATE, MONOBASIC POTASSIUM PHOSPHATE, DIBASIC 236; 224 MG/ML; MG/ML
15 INJECTION, SOLUTION INTRAVENOUS ONCE
Qty: 0 | Refills: 0 | Status: COMPLETED | OUTPATIENT
Start: 2017-07-25 | End: 2017-07-25

## 2017-07-25 RX ORDER — MAGNESIUM SULFATE 500 MG/ML
1 VIAL (ML) INJECTION ONCE
Qty: 0 | Refills: 0 | Status: COMPLETED | OUTPATIENT
Start: 2017-07-25 | End: 2017-07-25

## 2017-07-25 RX ORDER — ERTAPENEM SODIUM 1 G/1
1000 INJECTION, POWDER, LYOPHILIZED, FOR SOLUTION INTRAMUSCULAR; INTRAVENOUS EVERY 24 HOURS
Qty: 0 | Refills: 0 | Status: DISCONTINUED | OUTPATIENT
Start: 2017-07-25 | End: 2017-07-26

## 2017-07-25 RX ADMIN — Medication 81 MILLIGRAM(S): at 12:29

## 2017-07-25 RX ADMIN — PIPERACILLIN AND TAZOBACTAM 25 GRAM(S): 4; .5 INJECTION, POWDER, LYOPHILIZED, FOR SOLUTION INTRAVENOUS at 05:54

## 2017-07-25 RX ADMIN — Medication 500 MILLIGRAM(S): at 12:29

## 2017-07-25 RX ADMIN — Medication 1 TABLET(S): at 12:29

## 2017-07-25 RX ADMIN — CARVEDILOL PHOSPHATE 12.5 MILLIGRAM(S): 80 CAPSULE, EXTENDED RELEASE ORAL at 17:27

## 2017-07-25 RX ADMIN — OXYCODONE AND ACETAMINOPHEN 1 TABLET(S): 5; 325 TABLET ORAL at 09:07

## 2017-07-25 RX ADMIN — POTASSIUM PHOSPHATE, MONOBASIC POTASSIUM PHOSPHATE, DIBASIC 62.5 MILLIMOLE(S): 236; 224 INJECTION, SOLUTION INTRAVENOUS at 11:47

## 2017-07-25 RX ADMIN — HEPARIN SODIUM 5000 UNIT(S): 5000 INJECTION INTRAVENOUS; SUBCUTANEOUS at 17:27

## 2017-07-25 RX ADMIN — HEPARIN SODIUM 5000 UNIT(S): 5000 INJECTION INTRAVENOUS; SUBCUTANEOUS at 05:54

## 2017-07-25 RX ADMIN — OXYCODONE AND ACETAMINOPHEN 1 TABLET(S): 5; 325 TABLET ORAL at 08:37

## 2017-07-25 RX ADMIN — ERTAPENEM SODIUM 120 MILLIGRAM(S): 1 INJECTION, POWDER, LYOPHILIZED, FOR SOLUTION INTRAMUSCULAR; INTRAVENOUS at 14:00

## 2017-07-25 RX ADMIN — Medication 2: at 12:29

## 2017-07-25 RX ADMIN — Medication 1: at 08:35

## 2017-07-25 RX ADMIN — Medication 1 APPLICATION(S): at 14:47

## 2017-07-25 RX ADMIN — LISINOPRIL 2.5 MILLIGRAM(S): 2.5 TABLET ORAL at 05:54

## 2017-07-25 RX ADMIN — CARVEDILOL PHOSPHATE 12.5 MILLIGRAM(S): 80 CAPSULE, EXTENDED RELEASE ORAL at 05:54

## 2017-07-25 RX ADMIN — Medication 100 GRAM(S): at 14:47

## 2017-07-25 RX ADMIN — Medication 1: at 17:27

## 2017-07-25 NOTE — PROGRESS NOTE ADULT - ASSESSMENT
SACRAL  DECUBITUS,  MORGANELLA   ON ERTAPENEM PER  ID,  NEEDS  PICC LINE SACRAL  DECUBITUS,  MORGANELLA   ON ERTAPENEM PER  ID,  NEEDS  PICC LINE,  rpt  chc,  prbc  if  still low, plan,  d c in am

## 2017-07-25 NOTE — PROGRESS NOTE ADULT - ASSESSMENT
Stage iV Sacral decub with gluteal abscess on IV antibiotics. Leukocytosis improved.     Increased PO intake encouraged  Consider Physical Therapy assessment    Continue Ertapenem --> 7/31

## 2017-07-26 VITALS
DIASTOLIC BLOOD PRESSURE: 70 MMHG | TEMPERATURE: 99 F | RESPIRATION RATE: 18 BRPM | SYSTOLIC BLOOD PRESSURE: 148 MMHG | HEART RATE: 68 BPM | OXYGEN SATURATION: 98 %

## 2017-07-26 LAB
HCT VFR BLD CALC: 24.6 % — LOW (ref 34.5–45)
HCT VFR BLD CALC: 25 % — LOW (ref 34.5–45)
HGB BLD-MCNC: 7.7 G/DL — LOW (ref 11.5–15.5)
HGB BLD-MCNC: 8.3 G/DL — LOW (ref 11.5–15.5)
MAGNESIUM SERPL-MCNC: 1.6 MG/DL — SIGNIFICANT CHANGE UP (ref 1.6–2.6)
MCHC RBC-ENTMCNC: 27.3 PG — SIGNIFICANT CHANGE UP (ref 27–34)
MCHC RBC-ENTMCNC: 29 PG — SIGNIFICANT CHANGE UP (ref 27–34)
MCHC RBC-ENTMCNC: 31.3 GM/DL — LOW (ref 32–36)
MCHC RBC-ENTMCNC: 33.2 GM/DL — SIGNIFICANT CHANGE UP (ref 32–36)
MCV RBC AUTO: 87.2 FL — SIGNIFICANT CHANGE UP (ref 80–100)
MCV RBC AUTO: 87.5 FL — SIGNIFICANT CHANGE UP (ref 80–100)
PHOSPHATE SERPL-MCNC: 2.8 MG/DL — SIGNIFICANT CHANGE UP (ref 2.5–4.5)
PLATELET # BLD AUTO: 479 K/UL — HIGH (ref 150–400)
PLATELET # BLD AUTO: 507 K/UL — HIGH (ref 150–400)
RBC # BLD: 2.82 M/UL — LOW (ref 3.8–5.2)
RBC # BLD: 2.86 M/UL — LOW (ref 3.8–5.2)
RBC # FLD: 16.3 % — HIGH (ref 10.3–14.5)
RBC # FLD: 16.5 % — HIGH (ref 10.3–14.5)
WBC # BLD: 12.9 K/UL — HIGH (ref 3.8–10.5)
WBC # BLD: 12.9 K/UL — HIGH (ref 3.8–10.5)
WBC # FLD AUTO: 12.9 K/UL — HIGH (ref 3.8–10.5)
WBC # FLD AUTO: 12.9 K/UL — HIGH (ref 3.8–10.5)

## 2017-07-26 PROCEDURE — P9016: CPT

## 2017-07-26 PROCEDURE — 87086 URINE CULTURE/COLONY COUNT: CPT

## 2017-07-26 PROCEDURE — 93306 TTE W/DOPPLER COMPLETE: CPT

## 2017-07-26 PROCEDURE — 97161 PT EVAL LOW COMPLEX 20 MIN: CPT

## 2017-07-26 PROCEDURE — 96375 TX/PRO/DX INJ NEW DRUG ADDON: CPT | Mod: XU

## 2017-07-26 PROCEDURE — 86902 BLOOD TYPE ANTIGEN DONOR EA: CPT

## 2017-07-26 PROCEDURE — 82435 ASSAY OF BLOOD CHLORIDE: CPT

## 2017-07-26 PROCEDURE — 36430 TRANSFUSION BLD/BLD COMPNT: CPT

## 2017-07-26 PROCEDURE — 97602 WOUND(S) CARE NON-SELECTIVE: CPT

## 2017-07-26 PROCEDURE — 86901 BLOOD TYPING SEROLOGIC RH(D): CPT

## 2017-07-26 PROCEDURE — 93005 ELECTROCARDIOGRAM TRACING: CPT

## 2017-07-26 PROCEDURE — 96374 THER/PROPH/DIAG INJ IV PUSH: CPT | Mod: XU

## 2017-07-26 PROCEDURE — 83036 HEMOGLOBIN GLYCOSYLATED A1C: CPT

## 2017-07-26 PROCEDURE — 71045 X-RAY EXAM CHEST 1 VIEW: CPT

## 2017-07-26 PROCEDURE — 84132 ASSAY OF SERUM POTASSIUM: CPT

## 2017-07-26 PROCEDURE — 87070 CULTURE OTHR SPECIMN AEROBIC: CPT

## 2017-07-26 PROCEDURE — 11042 DBRDMT SUBQ TIS 1ST 20SQCM/<: CPT

## 2017-07-26 PROCEDURE — 85610 PROTHROMBIN TIME: CPT

## 2017-07-26 PROCEDURE — 83735 ASSAY OF MAGNESIUM: CPT

## 2017-07-26 PROCEDURE — 82330 ASSAY OF CALCIUM: CPT

## 2017-07-26 PROCEDURE — 97530 THERAPEUTIC ACTIVITIES: CPT

## 2017-07-26 PROCEDURE — 80053 COMPREHEN METABOLIC PANEL: CPT

## 2017-07-26 PROCEDURE — 88304 TISSUE EXAM BY PATHOLOGIST: CPT | Mod: 26

## 2017-07-26 PROCEDURE — 84100 ASSAY OF PHOSPHORUS: CPT

## 2017-07-26 PROCEDURE — 87186 SC STD MICRODIL/AGAR DIL: CPT

## 2017-07-26 PROCEDURE — 36569 INSJ PICC 5 YR+ W/O IMAGING: CPT

## 2017-07-26 PROCEDURE — 86922 COMPATIBILITY TEST ANTIGLOB: CPT

## 2017-07-26 PROCEDURE — 85730 THROMBOPLASTIN TIME PARTIAL: CPT

## 2017-07-26 PROCEDURE — 87040 BLOOD CULTURE FOR BACTERIA: CPT

## 2017-07-26 PROCEDURE — 82607 VITAMIN B-12: CPT

## 2017-07-26 PROCEDURE — 83605 ASSAY OF LACTIC ACID: CPT

## 2017-07-26 PROCEDURE — 84295 ASSAY OF SERUM SODIUM: CPT

## 2017-07-26 PROCEDURE — 99232 SBSQ HOSP IP/OBS MODERATE 35: CPT

## 2017-07-26 PROCEDURE — 86850 RBC ANTIBODY SCREEN: CPT

## 2017-07-26 PROCEDURE — 86900 BLOOD TYPING SEROLOGIC ABO: CPT

## 2017-07-26 PROCEDURE — 81001 URINALYSIS AUTO W/SCOPE: CPT

## 2017-07-26 PROCEDURE — 80048 BASIC METABOLIC PNL TOTAL CA: CPT

## 2017-07-26 PROCEDURE — 84443 ASSAY THYROID STIM HORMONE: CPT

## 2017-07-26 PROCEDURE — 86905 BLOOD TYPING RBC ANTIGENS: CPT

## 2017-07-26 PROCEDURE — 72193 CT PELVIS W/DYE: CPT

## 2017-07-26 PROCEDURE — 99285 EMERGENCY DEPT VISIT HI MDM: CPT | Mod: 25

## 2017-07-26 PROCEDURE — 82947 ASSAY GLUCOSE BLOOD QUANT: CPT

## 2017-07-26 PROCEDURE — C1751: CPT

## 2017-07-26 PROCEDURE — 85014 HEMATOCRIT: CPT

## 2017-07-26 PROCEDURE — 86880 COOMBS TEST DIRECT: CPT

## 2017-07-26 PROCEDURE — 86870 RBC ANTIBODY IDENTIFICATION: CPT

## 2017-07-26 PROCEDURE — 82803 BLOOD GASES ANY COMBINATION: CPT

## 2017-07-26 PROCEDURE — 85027 COMPLETE CBC AUTOMATED: CPT

## 2017-07-26 PROCEDURE — 97116 GAIT TRAINING THERAPY: CPT

## 2017-07-26 RX ORDER — CARVEDILOL PHOSPHATE 80 MG/1
1 CAPSULE, EXTENDED RELEASE ORAL
Qty: 0 | Refills: 0 | COMMUNITY
Start: 2017-07-26

## 2017-07-26 RX ORDER — ASCORBIC ACID 60 MG
1 TABLET,CHEWABLE ORAL
Qty: 0 | Refills: 0 | COMMUNITY
Start: 2017-07-26

## 2017-07-26 RX ORDER — ERTAPENEM SODIUM 1 G/1
0 INJECTION, POWDER, LYOPHILIZED, FOR SOLUTION INTRAMUSCULAR; INTRAVENOUS
Qty: 0 | Refills: 0 | COMMUNITY
Start: 2017-07-26 | End: 2017-07-31

## 2017-07-26 RX ORDER — SODIUM HYPOCHLORITE 0.125 %
1 SOLUTION, NON-ORAL MISCELLANEOUS
Qty: 0 | Refills: 0 | COMMUNITY
Start: 2017-07-26

## 2017-07-26 RX ORDER — INSULIN LISPRO 100/ML
0 VIAL (ML) SUBCUTANEOUS
Qty: 0 | Refills: 0 | COMMUNITY
Start: 2017-07-26

## 2017-07-26 RX ORDER — ASPIRIN/CALCIUM CARB/MAGNESIUM 324 MG
1 TABLET ORAL
Qty: 0 | Refills: 0 | COMMUNITY
Start: 2017-07-26

## 2017-07-26 RX ORDER — LISINOPRIL 2.5 MG/1
1 TABLET ORAL
Qty: 0 | Refills: 0 | COMMUNITY
Start: 2017-07-26

## 2017-07-26 RX ADMIN — ERTAPENEM SODIUM 120 MILLIGRAM(S): 1 INJECTION, POWDER, LYOPHILIZED, FOR SOLUTION INTRAMUSCULAR; INTRAVENOUS at 09:15

## 2017-07-26 RX ADMIN — Medication 2: at 12:19

## 2017-07-26 RX ADMIN — HEPARIN SODIUM 5000 UNIT(S): 5000 INJECTION INTRAVENOUS; SUBCUTANEOUS at 05:02

## 2017-07-26 RX ADMIN — Medication 500 MILLIGRAM(S): at 12:20

## 2017-07-26 RX ADMIN — Medication 81 MILLIGRAM(S): at 12:20

## 2017-07-26 RX ADMIN — Medication 1 TABLET(S): at 12:20

## 2017-07-26 RX ADMIN — CARVEDILOL PHOSPHATE 12.5 MILLIGRAM(S): 80 CAPSULE, EXTENDED RELEASE ORAL at 05:03

## 2017-07-26 RX ADMIN — LISINOPRIL 2.5 MILLIGRAM(S): 2.5 TABLET ORAL at 05:02

## 2017-07-26 RX ADMIN — Medication 1: at 09:11

## 2017-07-26 NOTE — PROGRESS NOTE ADULT - PROVIDER SPECIALTY LIST ADULT
Cardiology
Infectious Disease
Internal Medicine
Wound Care
Wound Care

## 2017-07-26 NOTE — PROGRESS NOTE ADULT - SUBJECTIVE AND OBJECTIVE BOX
requested by Dr ENRICO Yates to reconsult on elderly  female who is not able to ambulate and who has a stage 4 sacral decubitus. Is incontinent of feces.  Wound is significantly  than when initially evaluated last week, after dressing changes with Dakin solution. Some tunnelling at 2 o'clock with no definite abscess noted. Two free segments of tissue debrided using mechanical technique. Status fully d/w daughter, Matty, at bedside , as well as with Dr Yates. Info provided for OP f/u. Wound redressed. Suitable for transfer to [facility].
- Patient seen and examined.  - In summary, patient is a 84y year old woman who presented with sacral  pain (19 Jul 2017 09:43)  - Today, patient is without complaints.         *****MEDICATIONS:    MEDICATIONS  (STANDING):  carvedilol 12.5 milliGRAM(s) Oral every 12 hours  aspirin enteric coated 81 milliGRAM(s) Oral daily  insulin lispro (HumaLOG) corrective regimen sliding scale   SubCutaneous three times a day before meals  dextrose 5%. 1000 milliLiter(s) (50 mL/Hr) IV Continuous <Continuous>  dextrose 50% Injectable 12.5 Gram(s) IV Push once  dextrose 50% Injectable 25 Gram(s) IV Push once  dextrose 50% Injectable 25 Gram(s) IV Push once  heparin  Injectable 5000 Unit(s) SubCutaneous every 12 hours  lisinopril 2.5 milliGRAM(s) Oral daily  Dakins Solution - 1/4 Strength 1 Application(s) Topical daily  multivitamin 1 Tablet(s) Oral daily  ascorbic acid 500 milliGRAM(s) Oral daily  ertapenem  IVPB 1000 milliGRAM(s) IV Intermittent every 24 hours    MEDICATIONS  (PRN):  dextrose Gel 1 Dose(s) Oral once PRN Blood Glucose LESS THAN 70 milliGRAM(s)/deciliter  glucagon  Injectable 1 milliGRAM(s) IntraMuscular once PRN Glucose LESS THAN 70 milligrams/deciliter  oxyCODONE    5 mG/acetaminophen 325 mG 1 Tablet(s) Oral every 12 hours PRN Moderate Pain (4 - 6)           ***** REVIEW OF SYSTEM:  GEN: no fever, no chills, no pain  RESP: no SOB, no cough, no sputum  CVS: no chest pain, no palpitations, no edema  GI: no abdominal pain, no nausea, no vomiting, no constipation, no diarrhea  : no dysurea, no frequency  NEURO: no headache, no diziness  PSYCH: no depression, not anxious  Derm : no itching, no rash         ***** VITAL SIGNS:    T(F): 98.9 (07-25-17 @ 04:01), Max: 99.2 (07-24-17 @ 21:07)  HR: 80 (07-25-17 @ 04:01) (63 - 80)  BP: 156/64 (07-25-17 @ 04:01) (156/64 - 171/74)  RR: 18 (07-25-17 @ 04:01) (18 - 18)  SpO2: 96% (07-25-17 @ 04:01) (90% - 100%)  Wt(kg): --  ,   I&O's Summary    24 Jul 2017 07:01  -  25 Jul 2017 07:00  --------------------------------------------------------  IN: 760 mL / OUT: 350 mL / NET: 410 mL               *****PHYSICAL EXAM:  GEN: A&O X 3 , NAD , comfortable  HEENT: NCAT, EOMI, MMM, no icterus  NECK: Supple, No JVD  CVS: S1S2 , regular , No M/R/G appreciated  PULM: CTA B/L,  no W/R/R appreciated  ABD.: soft. non tender, non distended,  bowel sounds present  Extrem: intact pulses , no edema noted  Derm: No rash or ecchymosis noted  PSYCH: normal mood, no depression, not anxious         *****LAB AND IMAGING:                 [All pertinent recent Imaging/Reports reviewed]         *****A S S E S S M E N T   A N D   P L A N :  84F who presented with sacral decubitus  abx per ID  wound care  VSS  cont current tx  on SQ heparin      __________________________  SUSANNE Rizo D.O.
- Patient seen and examined.  - In summary, patient is a 84y year old woman who presented with sacral  pain (19 Jul 2017 09:43)  - Today, patient is without complaints.         *****MEDICATIONS:    MEDICATIONS  (STANDING):  carvedilol 12.5 milliGRAM(s) Oral every 12 hours  aspirin enteric coated 81 milliGRAM(s) Oral daily  insulin lispro (HumaLOG) corrective regimen sliding scale   SubCutaneous three times a day before meals  dextrose 5%. 1000 milliLiter(s) (50 mL/Hr) IV Continuous <Continuous>  dextrose 50% Injectable 12.5 Gram(s) IV Push once  dextrose 50% Injectable 25 Gram(s) IV Push once  dextrose 50% Injectable 25 Gram(s) IV Push once  heparin  Injectable 5000 Unit(s) SubCutaneous every 12 hours  piperacillin/tazobactam IVPB. 3.375 Gram(s) IV Intermittent every 8 hours  lisinopril 2.5 milliGRAM(s) Oral daily  Dakins Solution - 1/4 Strength 1 Application(s) Topical daily  multivitamin 1 Tablet(s) Oral daily  ascorbic acid 500 milliGRAM(s) Oral daily    MEDICATIONS  (PRN):  dextrose Gel 1 Dose(s) Oral once PRN Blood Glucose LESS THAN 70 milliGRAM(s)/deciliter  glucagon  Injectable 1 milliGRAM(s) IntraMuscular once PRN Glucose LESS THAN 70 milligrams/deciliter  oxyCODONE    5 mG/acetaminophen 325 mG 1 Tablet(s) Oral every 12 hours PRN Moderate Pain (4 - 6)           ***** REVIEW OF SYSTEM:  GEN: no fever, no chills, no pain  RESP: no SOB, no cough, no sputum  CVS: no chest pain, no palpitations, no edema  GI: no abdominal pain, no nausea, no vomiting, no constipation, no diarrhea  : no dysurea, no frequency  NEURO: no headache, no diziness  PSYCH: no depression, not anxious  Derm : no itching, no rash         ***** VITAL SIGNS:    T(F): 98.8 (07-24-17 @ 03:38), Max: 99.3 (07-23-17 @ 21:02)  HR: 66 (07-24-17 @ 03:38) (66 - 93)  BP: 153/74 (07-24-17 @ 05:53) (121/70 - 175/70)  RR: 18 (07-24-17 @ 03:38) (18 - 18)  SpO2: 99% (07-24-17 @ 03:38) (99% - 100%)  Wt(kg): --  ,   I&O's Summary    23 Jul 2017 07:01  -  24 Jul 2017 07:00  --------------------------------------------------------  IN: 360 mL / OUT: 1050 mL / NET: -690 mL               *****PHYSICAL EXAM:  GEN: A&O X 3 , NAD , comfortable  HEENT: NCAT, EOMI, MMM, no icterus  NECK: Supple, No JVD  CVS: S1S2 , regular , No M/R/G appreciated  PULM: CTA B/L,  no W/R/R appreciated  ABD.: soft. non tender, non distended,  bowel sounds present  Extrem: intact pulses , no edema noted  Derm: No rash or ecchymosis noted  PSYCH: normal mood, no depression, not anxious         *****LAB AND IMAGING:                 [All pertinent recent Imaging/Reports reviewed]         *****A S S E S S M E N T   A N D   P L A N :  84F who presented with sacral decubitus  abx per ID  wound care  VSS  cont current tx  on SQ heparin      __________________________  SUSANNE Rizo D.O.
- Patient seen and examined.  - In summary, patient is a 84y year old woman who presented with sacral  pain (2017 09:43)  - Today, patient is without complaints.         *****MEDICATIONS:    MEDICATIONS  (STANDING):  carvedilol 12.5 milliGRAM(s) Oral every 12 hours  aspirin enteric coated 81 milliGRAM(s) Oral daily  insulin lispro (HumaLOG) corrective regimen sliding scale   SubCutaneous three times a day before meals  dextrose 5%. 1000 milliLiter(s) (50 mL/Hr) IV Continuous <Continuous>  dextrose 50% Injectable 12.5 Gram(s) IV Push once  dextrose 50% Injectable 25 Gram(s) IV Push once  dextrose 50% Injectable 25 Gram(s) IV Push once  heparin  Injectable 5000 Unit(s) SubCutaneous every 12 hours  piperacillin/tazobactam IVPB. 3.375 Gram(s) IV Intermittent every 8 hours  lisinopril 10 milliGRAM(s) Oral daily  vancomycin  IVPB 1000 milliGRAM(s) IV Intermittent every 24 hours    MEDICATIONS  (PRN):  dextrose Gel 1 Dose(s) Oral once PRN Blood Glucose LESS THAN 70 milliGRAM(s)/deciliter  glucagon  Injectable 1 milliGRAM(s) IntraMuscular once PRN Glucose LESS THAN 70 milligrams/deciliter  oxyCODONE    5 mG/acetaminophen 325 mG 1 Tablet(s) Oral every 12 hours PRN Moderate Pain (4 - 6)           ***** REVIEW OF SYSTEM:  GEN: no fever, no chills, no pain  RESP: no SOB, no cough, no sputum  CVS: no chest pain, no palpitations, no edema  GI: no abdominal pain, no nausea, no vomiting, no constipation, no diarrhea  : no dysurea, no frequency  NEURO: no headache, no diziness  PSYCH: no depression, not anxious  Derm : no itching, no rash         ***** VITAL SIGNS:    T(F): 98.7 (17 @ 03:52), Max: 99 (17 @ 18:42)  HR: 72 (17 @ 03:52) (67 - 72)  BP: 103/60 (17 @ 03:52) (101/62 - 119/54)  RR: 18 (17 @ 03:52) (18 - 18)  SpO2: 100% (17 @ 03:52) (96% - 100%)  Wt(kg): --  ,   I&O's Summary    2017 07:01  -  2017 07:00  --------------------------------------------------------  IN: 1030 mL / OUT: 450 mL / NET: 580 mL               *****PHYSICAL EXAM:  GEN: A&O X 3 , NAD , comfortable  HEENT: NCAT, EOMI, MMM, no icterus  NECK: Supple, No JVD  CVS: S1S2 , regular , No M/R/G appreciated  PULM: CTA B/L,  no W/R/R appreciated  ABD.: soft. non tender, non distended,  bowel sounds present  Extrem: intact pulses , no edema noted  Derm: No rash or ecchymosis noted  PSYCH: normal mood, no depression, not anxious         *****LAB AND IMAGIN.0    20.65 )-----------( 612      ( 2017 07:23 )             24.2               07-19    140  |  106  |  39<H>  ----------------------------<  151<H>  4.5   |  17<L>  |  1.10    Ca    9.4      2017 07:30    TPro  6.0  /  Alb  2.9<L>  /  TBili  0.4  /  DBili  x   /  AST  20  /  ALT  15  /  AlkPhos  81  07-18    PT/INR - ( 2017 12:15 )   PT: 14.8 sec;   INR: 1.35 ratio         PTT - ( 2017 12:15 )  PTT:24.3 sec                   Urinalysis Basic - ( 2017 07:50 )    Color: Yellow / Appearance: x / S.022 / pH: x  Gluc: x / Ketone: Negative  / Bili: Negative / Urobili: Negative   Blood: x / Protein: 100 mg/dL / Nitrite: Negative   Leuk Esterase: Large / RBC: 2-5 /HPF / WBC >50 /HPF   Sq Epi: x / Non Sq Epi: x / Bacteria: Few /HPF          [All pertinent recent Imaging/Reports reviewed]         *****A S S E S S M E N T   A N D   P L A N :  84F who presented with sacral decubitus  abx per ID  wound care  VSS  cont current tx  on SQ heparin        __________________________  A. KHLOE Rizo.
- Patient seen and examined.  - In summary, patient is a 84y year old woman who presented with sacral  pain (2017 09:43)  - Today, patient is without complaints.         *****MEDICATIONS:    MEDICATIONS  (STANDING):  carvedilol 12.5 milliGRAM(s) Oral every 12 hours  aspirin enteric coated 81 milliGRAM(s) Oral daily  insulin lispro (HumaLOG) corrective regimen sliding scale   SubCutaneous three times a day before meals  dextrose 5%. 1000 milliLiter(s) (50 mL/Hr) IV Continuous <Continuous>  dextrose 50% Injectable 12.5 Gram(s) IV Push once  dextrose 50% Injectable 25 Gram(s) IV Push once  dextrose 50% Injectable 25 Gram(s) IV Push once  heparin  Injectable 5000 Unit(s) SubCutaneous every 12 hours  piperacillin/tazobactam IVPB. 3.375 Gram(s) IV Intermittent every 8 hours  lisinopril 10 milliGRAM(s) Oral daily  vancomycin  IVPB 1000 milliGRAM(s) IV Intermittent every 24 hours    MEDICATIONS  (PRN):  dextrose Gel 1 Dose(s) Oral once PRN Blood Glucose LESS THAN 70 milliGRAM(s)/deciliter  glucagon  Injectable 1 milliGRAM(s) IntraMuscular once PRN Glucose LESS THAN 70 milligrams/deciliter  oxyCODONE    5 mG/acetaminophen 325 mG 1 Tablet(s) Oral every 12 hours PRN Moderate Pain (4 - 6)           ***** REVIEW OF SYSTEM:  GEN: no fever, no chills, no pain  RESP: no SOB, no cough, no sputum  CVS: no chest pain, no palpitations, no edema  GI: no abdominal pain, no nausea, no vomiting, no constipation, no diarrhea  : no dysurea, no frequency  NEURO: no headache, no diziness  PSYCH: no depression, not anxious  Derm : no itching, no rash         ***** VITAL SIGNS:  T(F): 98.4 (17 @ 12:03), Max: 98.9 (17 @ 04:33)  HR: 67 (17 @ 12:03) (62 - 84)  BP: 115/71 (17 @ 12:03) (95/50 - 115/71)  RR: 18 (17 @ 12:03) (18 - 18)  SpO2: 96% (17 @ 12:03) (96% - 99%)  Wt(kg): --  ,   I&O's Summary    2017 07:01  -  2017 07:00  --------------------------------------------------------  IN: 690 mL / OUT: 0 mL / NET: 690 mL    2017 07:  -  2017 14:10  --------------------------------------------------------  IN: 360 mL / OUT: 0 mL / NET: 360 mL             *****PHYSICAL EXAM:  GEN: A&O X 3 , NAD , comfortable  HEENT: NCAT, EOMI, MMM, no icterus  NECK: Supple, No JVD  CVS: S1S2 , regular , No M/R/G appreciated  PULM: CTA B/L,  no W/R/R appreciated  ABD.: soft. non tender, non distended,  bowel sounds present  Extrem: intact pulses , no edema noted  Derm: No rash or ecchymosis noted  PSYCH: normal mood, no depression, not anxious         *****LAB AND IMAGIN.4    23.78 )-----------( 620      ( 2017 07:25 )             22.4                   140  |  106  |  39<H>  ----------------------------<  151<H>  4.5   |  17<L>  |  1.10    Ca    9.4      2017 07:30    TPro  6.0  /  Alb  2.9<L>  /  TBili  0.4  /  DBili  x   /  AST  20  /  ALT  15  /  AlkPhos  81  07-18    PT/INR - ( 2017 12:15 )   PT: 14.8 sec;   INR: 1.35 ratio         PTT - ( 2017 12:15 )  PTT:24.3 sec                     [All pertinent recent Imaging/Reports reviewed]         *****A S S E S S M E N T   A N D   P L A N :  84F who presented with sacral decubitus  abx per ID  wound care  VSS  cont current tx  on SQ heparin        __________________________  A. KIM Rizo
- Patient seen and examined.  - In summary, patient is a 84y year old woman who presented with sacral  pain (2017 09:43)  - Today, patient is without complaints.         *****MEDICATIONS:    MEDICATIONS  (STANDING):  carvedilol 12.5 milliGRAM(s) Oral every 12 hours  aspirin enteric coated 81 milliGRAM(s) Oral daily  insulin lispro (HumaLOG) corrective regimen sliding scale   SubCutaneous three times a day before meals  dextrose 5%. 1000 milliLiter(s) (50 mL/Hr) IV Continuous <Continuous>  dextrose 50% Injectable 12.5 Gram(s) IV Push once  dextrose 50% Injectable 25 Gram(s) IV Push once  dextrose 50% Injectable 25 Gram(s) IV Push once  heparin  Injectable 5000 Unit(s) SubCutaneous every 12 hours  piperacillin/tazobactam IVPB. 3.375 Gram(s) IV Intermittent every 8 hours  lisinopril 2.5 milliGRAM(s) Oral daily  Dakins Solution - 1/4 Strength 1 Application(s) Topical daily  multivitamin 1 Tablet(s) Oral daily  ascorbic acid 500 milliGRAM(s) Oral daily    MEDICATIONS  (PRN):  dextrose Gel 1 Dose(s) Oral once PRN Blood Glucose LESS THAN 70 milliGRAM(s)/deciliter  glucagon  Injectable 1 milliGRAM(s) IntraMuscular once PRN Glucose LESS THAN 70 milligrams/deciliter  oxyCODONE    5 mG/acetaminophen 325 mG 1 Tablet(s) Oral every 12 hours PRN Moderate Pain (4 - 6)           ***** REVIEW OF SYSTEM:  GEN: no fever, no chills, no pain  RESP: no SOB, no cough, no sputum  CVS: no chest pain, no palpitations, no edema  GI: no abdominal pain, no nausea, no vomiting, no constipation, no diarrhea  : no dysurea, no frequency  NEURO: no headache, no diziness  PSYCH: no depression, not anxious  Derm : no itching, no rash         ***** VITAL SIGNS:    T(F): 98.2 (17 @ 03:30), Max: 99.4 (17 @ 11:23)  HR: 67 (17 @ 03:30) (67 - 71)  BP: 138/69 (17 @ 03:30) (127/65 - 147/77)  RR: 17 (17 @ 03:30) (17 - 17)  SpO2: 98% (17 @ 03:30) (98% - 100%)  Wt(kg): --  ,   I&O's Summary    2017 07:01  -  2017 07:00  --------------------------------------------------------  IN: 800 mL / OUT: 850 mL / NET: -50 mL             *****PHYSICAL EXAM:  GEN: A&O X 3 , NAD , comfortable  HEENT: NCAT, EOMI, MMM, no icterus  NECK: Supple, No JVD  CVS: S1S2 , regular , No M/R/G appreciated  PULM: CTA B/L,  no W/R/R appreciated  ABD.: soft. non tender, non distended,  bowel sounds present  Extrem: intact pulses , no edema noted  Derm: No rash or ecchymosis noted  PSYCH: normal mood, no depression, not anxious         *****LAB AND IMAGIN.1    18.50 )-----------( 619      ( 2017 08:58 )             24.5               07-21    136  |  104  |  16  ----------------------------<  229<H>  4.3   |  20<L>  |  0.71    Ca    8.8      2017 10:53  Phos  2.3     07-  Mg     1.7         [All pertinent recent Imaging/Reports reviewed]         *****A S S E S S M E N T   A N D   P L A N :  84F who presented with sacral decubitus  abx per ID  wound care  VSS  cont current tx  on SQ heparin      __________________________  SUSANNE Rizo D.O.
- Patient seen and examined.  - In summary, patient is a 84y year old woman who presented with sacral  pain (2017 09:43)  - Today, patient is without complaints.         *****MEDICATIONS:    MEDICATIONS  (STANDING):  carvedilol 12.5 milliGRAM(s) Oral every 12 hours  aspirin enteric coated 81 milliGRAM(s) Oral daily  insulin lispro (HumaLOG) corrective regimen sliding scale   SubCutaneous three times a day before meals  dextrose 5%. 1000 milliLiter(s) (50 mL/Hr) IV Continuous <Continuous>  dextrose 50% Injectable 12.5 Gram(s) IV Push once  dextrose 50% Injectable 25 Gram(s) IV Push once  dextrose 50% Injectable 25 Gram(s) IV Push once  heparin  Injectable 5000 Unit(s) SubCutaneous every 12 hours  piperacillin/tazobactam IVPB. 3.375 Gram(s) IV Intermittent every 8 hours  lisinopril 2.5 milliGRAM(s) Oral daily  Dakins Solution - 1/4 Strength 1 Application(s) Topical daily  multivitamin 1 Tablet(s) Oral daily  ascorbic acid 500 milliGRAM(s) Oral daily    MEDICATIONS  (PRN):  dextrose Gel 1 Dose(s) Oral once PRN Blood Glucose LESS THAN 70 milliGRAM(s)/deciliter  glucagon  Injectable 1 milliGRAM(s) IntraMuscular once PRN Glucose LESS THAN 70 milligrams/deciliter  oxyCODONE    5 mG/acetaminophen 325 mG 1 Tablet(s) Oral every 12 hours PRN Moderate Pain (4 - 6)           ***** REVIEW OF SYSTEM:  GEN: no fever, no chills, no pain  RESP: no SOB, no cough, no sputum  CVS: no chest pain, no palpitations, no edema  GI: no abdominal pain, no nausea, no vomiting, no constipation, no diarrhea  : no dysurea, no frequency  NEURO: no headache, no diziness  PSYCH: no depression, not anxious  Derm : no itching, no rash         ***** VITAL SIGNS:    T(F): 98.3 (17 @ 03:58), Max: 98.3 (17 @ 03:58)  HR: 74 (17 @ 03:58) (66 - 74)  BP: 144/68 (17 @ 03:58) (118/69 - 144/68)  RR: 17 (17 @ 03:58) (17 - 18)  SpO2: 99% (17 @ 03:58) (99% - 100%)  Wt(kg): --  ,   I&O's Summary    2017 07:01  -  2017 07:00  --------------------------------------------------------  IN: 1270 mL / OUT: 1350 mL / NET: -80 mL               *****PHYSICAL EXAM:  GEN: A&O X 3 , NAD , comfortable  HEENT: NCAT, EOMI, MMM, no icterus  NECK: Supple, No JVD  CVS: S1S2 , regular , No M/R/G appreciated  PULM: CTA B/L,  no W/R/R appreciated  ABD.: soft. non tender, non distended,  bowel sounds present  Extrem: intact pulses , no edema noted  Derm: No rash or ecchymosis noted  PSYCH: normal mood, no depression, not anxious         *****LAB AND IMAGIN.1    18.50 )-----------( 619      ( 2017 08:58 )             24.5               07-21    136  |  104  |  16  ----------------------------<  229<H>  4.3   |  20<L>  |  0.71    Ca    8.8      2017 10:53  Phos  2.3       Mg     1.7           [All pertinent recent Imaging/Reports reviewed]         *****A S S E S S M E N T   A N D   P L A N :  84F who presented with sacral decubitus  abx per ID  wound care  VSS  cont current tx  on SQ heparin      __________________________  SUSANNE Rizo D.O.
- Patient seen and examined.  - In summary, patient is a 84y year old woman who presented with sacral  pain (2017 09:43)  - Today, patient is without complaints.         *****MEDICATIONS:    MEDICATIONS  (STANDING):  carvedilol 12.5 milliGRAM(s) Oral every 12 hours  aspirin enteric coated 81 milliGRAM(s) Oral daily  insulin lispro (HumaLOG) corrective regimen sliding scale   SubCutaneous three times a day before meals  dextrose 5%. 1000 milliLiter(s) (50 mL/Hr) IV Continuous <Continuous>  dextrose 50% Injectable 12.5 Gram(s) IV Push once  dextrose 50% Injectable 25 Gram(s) IV Push once  dextrose 50% Injectable 25 Gram(s) IV Push once  heparin  Injectable 5000 Unit(s) SubCutaneous every 12 hours  piperacillin/tazobactam IVPB. 3.375 Gram(s) IV Intermittent every 8 hours  vancomycin  IVPB 1000 milliGRAM(s) IV Intermittent every 24 hours  lisinopril 2.5 milliGRAM(s) Oral daily  Dakins Solution - 1/4 Strength 1 Application(s) Topical daily    MEDICATIONS  (PRN):  dextrose Gel 1 Dose(s) Oral once PRN Blood Glucose LESS THAN 70 milliGRAM(s)/deciliter  glucagon  Injectable 1 milliGRAM(s) IntraMuscular once PRN Glucose LESS THAN 70 milligrams/deciliter  oxyCODONE    5 mG/acetaminophen 325 mG 1 Tablet(s) Oral every 12 hours PRN Moderate Pain (4 - 6)           ***** REVIEW OF SYSTEM:  GEN: no fever, no chills, no pain  RESP: no SOB, no cough, no sputum  CVS: no chest pain, no palpitations, no edema  GI: no abdominal pain, no nausea, no vomiting, no constipation, no diarrhea  : no dysurea, no frequency  NEURO: no headache, no diziness  PSYCH: no depression, not anxious  Derm : no itching, no rash         ***** VITAL SIGNS:    T(F): 98.4 (17 @ 04:21), Max: 98.8 (17 @ 11:49)  HR: 64 (17 @ 04:21) (64 - 68)  BP: 120/64 (17 @ 04:21) (104/62 - 120/64)  RR: 18 (17 @ 04:21) (18 - 18)  SpO2: 99% (17 @ 04:21) (98% - 99%)  Wt(kg): --  ,   I&O's Summary    2017 07:01  -  2017 07:00  --------------------------------------------------------  IN: 1050 mL / OUT: 850 mL / NET: 200 mL           *****PHYSICAL EXAM:  GEN: A&O X 3 , NAD , comfortable  HEENT: NCAT, EOMI, MMM, no icterus  NECK: Supple, No JVD  CVS: S1S2 , regular , No M/R/G appreciated  PULM: CTA B/L,  no W/R/R appreciated  ABD.: soft. non tender, non distended,  bowel sounds present  Extrem: intact pulses , no edema noted  Derm: No rash or ecchymosis noted  PSYCH: normal mood, no depression, not anxious         *****LAB AND IMAGIN.0    20.65 )-----------( 612      ( 2017 07:23 )             24.2                           Urinalysis Basic - ( 2017 07:50 )    Color: Yellow / Appearance: x / S.022 / pH: x  Gluc: x / Ketone: Negative  / Bili: Negative / Urobili: Negative   Blood: x / Protein: 100 mg/dL / Nitrite: Negative   Leuk Esterase: Large / RBC: 2-5 /HPF / WBC >50 /HPF   Sq Epi: x / Non Sq Epi: x / Bacteria: Few /HPF      [All pertinent recent Imaging/Reports reviewed]         *****A S S E S S M E N T   A N D   P L A N :  84F who presented with sacral decubitus  abx per ID  wound care  VSS  cont current tx  on SQ heparin        __________________________  SUSANNE Rizo D.O.
Follow Up:      Interval History/ROS: Patient denies pain. She notes history of DM, HTN and Parkinsons' She states she was admitted to [facility] about 1 week ago for need for supervision/care at night and states she had the sacral ulcer at admission there. She is not ambulatory and has no insight why she lost the ability to walk. 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 She denies history of stroke.    Allergies  Sinemet (Unknown)    ANTIMICROBIALS:  piperacillin/tazobactam IVPB. 3.375 every 8 hours  vancomycin  IVPB 1000 every 24 hours      OTHER MEDS:  MEDICATIONS  (STANDING):  carvedilol 12.5 every 12 hours  aspirin enteric coated 81 daily  insulin lispro (HumaLOG) corrective regimen sliding scale  three times a day before meals  dextrose Gel 1 once PRN  dextrose 50% Injectable 12.5 once  dextrose 50% Injectable 25 once  dextrose 50% Injectable 25 once  glucagon  Injectable 1 once PRN  heparin  Injectable 5000 every 12 hours  oxyCODONE    5 mG/acetaminophen 325 mG 1 every 12 hours PRN  lisinopril 10 daily      Vital Signs Last 24 Hrs  T(C): 37.2 (19 Jul 2017 04:33), Max: 37.2 (19 Jul 2017 04:33)  T(F): 98.9 (19 Jul 2017 04:33), Max: 98.9 (19 Jul 2017 04:33)  HR: 71 (19 Jul 2017 04:33) (60 - 84)  BP: 112/56 (19 Jul 2017 04:33) (95/50 - 116/69)  BP(mean): --  RR: 18 (19 Jul 2017 04:33) (16 - 18)  SpO2: 99% (19 Jul 2017 04:33) (96% - 100%)    PHYSICAL EXAM:  General: WN/WD NAD, Non-toxic, soft voice,  Neurology: A&Ox3, nonfocal, increase motor tone,   Respiratory: Clear to auscultation bilaterally  CV: RRR, S1S2, no murmurs, rubs or gallops  Abdominal: Soft, Non-tender, non-distended, normal bowel sounds  Extremities: No edema, + peripheral pulses  Line Sites: Clear  Skin: large deep sarcral ulcer stage IV with sacral bone palpable at base, scattered eschar, undermined edges, no purulence (examined after debridement by Wound Care)                        7.4    23.78 )-----------( 620      ( 19 Jul 2017 07:25 )             22.4       07-19    140  |  106  |  39<H>  ----------------------------<  151<H>  4.5   |  17<L>  |  1.10    Ca    9.4      19 Jul 2017 07:30    TPro  6.0  /  Alb  2.9<L>  /  TBili  0.4  /  DBili  x   /  AST  20  /  ALT  15  /  AlkPhos  81  07-18          :    Armando Yates MD; Division of Infectious Disease; Pager: [phone]; nights and weekends: [phone]
Follow Up:      Interval History/ROS: eating better -  grand-daughter at bedside notes that patient was hospitalized at [facility] and then rehab at [facility] [place]. The family received medical opinions that she did not have Parkinsons. Ms. Luna insistently declines trial of low dose Sinemet. Grand daughter estimates that she stopped walking May-2017 and believes it is simply related to weakness/deconditioning.    Allergies  Sinemet (Unknown)    ANTIMICROBIALS:  ertapenem  IVPB 1000 every 24 hours      OTHER MEDS:  MEDICATIONS  (STANDING):  carvedilol 12.5 every 12 hours  aspirin enteric coated 81 daily  insulin lispro (HumaLOG) corrective regimen sliding scale  three times a day before meals  dextrose Gel 1 once PRN  dextrose 50% Injectable 12.5 once  dextrose 50% Injectable 25 once  dextrose 50% Injectable 25 once  glucagon  Injectable 1 once PRN  heparin  Injectable 5000 every 12 hours  oxyCODONE    5 mG/acetaminophen 325 mG 1 every 12 hours PRN  lisinopril 2.5 daily      Vital Signs Last 24 Hrs  T(C): 36.9 (25 Jul 2017 12:04), Max: 37.3 (24 Jul 2017 21:07)  T(F): 98.5 (25 Jul 2017 12:04), Max: 99.2 (24 Jul 2017 21:07)  HR: 68 (25 Jul 2017 12:04) (68 - 80)  BP: 135/80 (25 Jul 2017 12:04) (135/80 - 171/74)  BP(mean): --  RR: 18 (25 Jul 2017 12:04) (18 - 18)  SpO2: 97% (25 Jul 2017 12:04) (96% - 100%)    PHYSICAL EXAM:  General: WN/WD NAD, Non-toxic, soft voice  Neurology: A&Ox3, nonfocal  Respiratory: no distress  CV: RRR, S1S2, no murmurs, rubs or gallops  Abdominal: Soft, Non-tender, non-distended, normal bowel sounds  Extremities: No edema, + peripheral pulses  Line Sites: new PICC in place  Skin: No rash                          9.8    16.2  )-----------( 317      ( 25 Jul 2017 13:04 )             30.3         Phos  2.4     07-25  Mg     1.4     07-25    MICROBIOLOGY:  Culture - Other (07.19.17 @ 01:36)    -  Cefepime: S 8    -  Ceftriaxone: R 8    -  Ertapenem: S <=0.5    -  Piperacillin/Tazobactam: S <=8    -  Ampicillin: R >16    -  Aztreonam: S <=4    -  Ceftazidime: S <=1    -  Ampicillin/Sulbactam: R >16/8    -  Cefazolin: R >16    -  Gentamicin: R >8    -  Imipenem: S <=1    -  Levofloxacin: R >4    -  Meropenem: S <=1    -  Trimethoprim/Sulfamethoxazole: S <=0.5/9.5    -  Amikacin: S <=8    -  Cefoxitin: I 16    -  Ciprofloxacin: R >2    -  Tobramycin: S <=2    Specimen Source: .Other Other, sacral wound    Culture Results:   Rare Morganella morganii  Moderate Corynebacterium species    Organism Identification: Morganella morganii    Organism: Morganella morganii    Method Type: [facility]              RADIOLOGY:    Armando Yates MD; Division of Infectious Disease; Pager: [phone]; nights and weekends: [phone]
Follow Up:      Interval History/ROS: feels better. - Patient describes previous hallucinations on anti-Parkinson medications. She states she did not derive any benefit. She will confer with her children but does not appear interested in low dose Sinemet trial. She states she lost the ability to walk in about April-2017 but does not know why. She has discomfort with dressing changes and notes soft stools.     Allergies  Sinemet --> hallucinations    ANTIMICROBIALS:  piperacillin/tazobactam IVPB. 3.375 every 8 hours    OTHER MEDS:  MEDICATIONS  (STANDING):  carvedilol 12.5 every 12 hours  aspirin enteric coated 81 daily  insulin lispro (HumaLOG) corrective regimen sliding scale  three times a day before meals  dextrose Gel 1 once PRN  dextrose 50% Injectable 12.5 once  dextrose 50% Injectable 25 once  dextrose 50% Injectable 25 once  glucagon  Injectable 1 once PRN  heparin  Injectable 5000 every 12 hours  oxyCODONE    5 mG/acetaminophen 325 mG 1 every 12 hours PRN  lisinopril 2.5 daily    Vital Signs Last 24 Hrs  T(C): 37.1 (24 Jul 2017 03:38), Max: 37.4 (23 Jul 2017 21:02)  T(F): 98.8 (24 Jul 2017 03:38), Max: 99.3 (23 Jul 2017 21:02)  HR: 66 (24 Jul 2017 03:38) (66 - 93)  BP: 153/74 (24 Jul 2017 05:53) (121/70 - 175/70)  BP(mean): --  RR: 18 (24 Jul 2017 03:38) (18 - 18)  SpO2: 99% (24 Jul 2017 03:38) (99% - 100%)    PHYSICAL EXAM:  General: WN/WD NAD, Non-toxic  Neurology: soft voice, decreased spontaneous movement, A&Ox3, nonfocal  Respiratory: Clear to auscultation bilaterally  CV: RRR, S1S2, no murmurs, rubs or gallops  Abdominal: Soft, Non-tender, non-distended, normal bowel sounds  Extremities: No edema  Line Sites: Clear  Skin: No rash; pressure ulcer not inspected    MICROBIOLOGY:  Culture - Other (07.19.17 @ 01:36)    -  Cefepime: S 8    -  Ceftriaxone: R 8    -  Ertapenem: S <=0.5    -  Piperacillin/Tazobactam: S <=8    -  Ampicillin: R >16    -  Aztreonam: S <=4    -  Ceftazidime: S <=1    -  Ampicillin/Sulbactam: R >16/8    -  Cefazolin: R >16    -  Gentamicin: R >8    -  Imipenem: S <=1    -  Levofloxacin: R >4    -  Meropenem: S <=1    -  Trimethoprim/Sulfamethoxazole: S <=0.5/9.5    -  Amikacin: S <=8    -  Cefoxitin: I 16    -  Ciprofloxacin: R >2    -  Tobramycin: S <=2    Specimen Source: .Other Other, sacral wound    Culture Results:   Rare Morganella morganii  Moderate Corynebacterium species    Organism Identification: Morganella morganii    Organism: Morganella morganii    Method Type: TERI    Culture - Blood (07.18.17 @ 15:31)    Specimen Source: .Blood Blood-Peripheral    Culture Results:   No growth at 5 days.    Culture - Blood (07.18.17 @ 15:31)    Specimen Source: .Blood Blood-Peripheral    Culture Results:   No growth at 5 days.    RADIOLOGY:  < from: CT Pelvis w/ IV Cont (07.18.17 @ 16:31) >    IMPRESSION: Large ulceration in the soft tissues overlying and inferior   to the sacrum and coccyx extending down to level of bone. Associated   marked destruction of the lower sacrum and coccyx, consistent with   osteomyelitis. Adjacent abscesses within the medial aspect of the right   gluteal musculature.    < end of copied text >      Armando Yates MD; Division of Infectious Disease; Pager: [phone]; nights and weekends: [phone]
Follow Up:      Interval History/ROS: no distress    Allergies  Sinemet (Unknown)    ANTIMICROBIALS:  ertapenem  IVPB 1000 every 24 hours    OTHER MEDS:  MEDICATIONS  (STANDING):  carvedilol 12.5 every 12 hours  aspirin enteric coated 81 daily  insulin lispro (HumaLOG) corrective regimen sliding scale  three times a day before meals  dextrose Gel 1 once PRN  dextrose 50% Injectable 12.5 once  dextrose 50% Injectable 25 once  dextrose 50% Injectable 25 once  glucagon  Injectable 1 once PRN  heparin  Injectable 5000 every 12 hours  lisinopril 2.5 daily      Vital Signs Last 24 Hrs  T(C): 37.6 (26 Jul 2017 05:37), Max: 37.8 (26 Jul 2017 04:40)  T(F): 99.6 (26 Jul 2017 05:37), Max: 100.1 (26 Jul 2017 04:40)  HR: 67 (26 Jul 2017 04:40) (67 - 68)  BP: 158/64 (26 Jul 2017 04:40) (124/69 - 158/64)  BP(mean): --  RR: 17 (26 Jul 2017 04:40) (17 - 18)  SpO2: 99% (26 Jul 2017 04:40) (97% - 100%)    PHYSICAL EXAM:  General: WN/WD NAD, Non-toxic  Line Sites: Clear  Skin: No rash  Sacral wound seen with wound care team: much improved, clean, no odor, extensive undermining. The right aspect of wound extends into deep lateral pocket corresponding to collection mentioned on CT scan.                        8.3    12.9  )-----------( 507      ( 26 Jul 2017 09:14 )             25.0     Phos  2.8     07-26  Mg     1.6     07-26      MICROBIOLOGY:  Culture - Other (07.19.17 @ 01:36)    -  Cefepime: S 8    -  Ceftriaxone: R 8    -  Ertapenem: S <=0.5    -  Piperacillin/Tazobactam: S <=8    -  Ampicillin: R >16    -  Aztreonam: S <=4    -  Ceftazidime: S <=1    -  Ampicillin/Sulbactam: R >16/8    -  Cefazolin: R >16    -  Gentamicin: R >8    -  Imipenem: S <=1    -  Levofloxacin: R >4    -  Meropenem: S <=1    -  Trimethoprim/Sulfamethoxazole: S <=0.5/9.5    -  Amikacin: S <=8    -  Cefoxitin: I 16    -  Ciprofloxacin: R >2    -  Tobramycin: S <=2    Specimen Source: .Other Other, sacral wound    Culture Results:   Rare Morganella morganii  Moderate Corynebacterium species    Organism Identification: Stevie suarez    Organism: Morganella morganii    Method Type: [facility]      RADIOLOGY:  < from: CT Pelvis w/ IV Cont (07.18.17 @ 16:31) >    There is a large ulceration in the soft tissues at the level of the   superior aspect of theintergluteal fold overlying the lower sacrum and   coccyx, extending down to the level of bone, with marked edema and   infiltration of the subcutaneous tissues. There is severe destruction of   the lower sacrum (S5 level) and coccyx. There is also adjacent edema and   thickening in the medial aspect of the gluteal musculature bilaterally,   consistent with myositis. There are also 2 adjacent ovoid rim-enhancing   collections in the medial aspect of the right gluteal musculature,, the   larger measuring 2.8 x 1.2 x 1.0 cm, suspicious for abscesses (series 4,   image [id number]). There is presacral and precoccygeal soft tissue edema in   the pelvis. There is mild bilateral hip arthrosis. There is lower lumbar   spondylosis.  The patient is status post hysterectomy. There are   atherosclerotic vascular calcifications.    < end of copied text >      Armando Yates MD; Division of Infectious Disease; Pager: [phone]; nights and weekends: [phone]
Follow Up:      Interval History/ROS: stomach ache today - now improved. notes mild diarrhea.     Allergies  Sinemet (Unknown)    ANTIMICROBIALS:  piperacillin/tazobactam IVPB. 3.375 every 8 hours  vancomycin  IVPB 1000 every 24 hours    OTHER MEDS:  MEDICATIONS  (STANDING):  carvedilol 12.5 every 12 hours  aspirin enteric coated 81 daily  insulin lispro (HumaLOG) corrective regimen sliding scale  three times a day before meals  dextrose Gel 1 once PRN  dextrose 50% Injectable 12.5 once  dextrose 50% Injectable 25 once  dextrose 50% Injectable 25 once  glucagon  Injectable 1 once PRN  heparin  Injectable 5000 every 12 hours  oxyCODONE    5 mG/acetaminophen 325 mG 1 every 12 hours PRN  lisinopril 2.5 daily      Vital Signs Last 24 Hrs  T(C): 37.1 (2017 11:49), Max: 37.2 (2017 18:42)  T(F): 98.8 (2017 11:49), Max: 99 (2017 18:42)  HR: 64 (2017 17:25) (64 - 72)  BP: 120/60 (2017 17:25) (101/62 - 120/60)  BP(mean): --  RR: 18 (2017 11:49) (18 - 18)  SpO2: 98% (2017 11:49) (96% - 100%)    PHYSICAL EXAM:  General: WN/WD NAD, Non-toxic, turned on right side  Neurology: A&Ox3, nonfocal, decreased sponteneous movement, soft voice  Respiratory: Clear to auscultation bilaterally  CV: RRR, S1S2, no murmurs, rubs or gallops  Abdominal: Soft, Non-tender, non-distended, normal bowel sounds  Extremities: No edema, in Z flow boots  Line Sites: Clear peripheral iv site  Skin: No rash                        8.0    20.65 )-----------( 612      ( 2017 07:23 )             24.2       07-    140  |  106  |  39<H>  ----------------------------<  151<H>  4.5   |  17<L>  |  1.10    Ca    9.4      2017 07:30        Urinalysis Basic - ( 2017 07:50 )    Color: Yellow / Appearance: x / S.022 / pH: x  Gluc: x / Ketone: Negative  / Bili: Negative / Urobili: Negative   Blood: x / Protein: 100 mg/dL / Nitrite: Negative   Leuk Esterase: Large / RBC: 2-5 /HPF / WBC >50 /HPF   Sq Epi: x / Non Sq Epi: x / Bacteria: Few /HPF    MICROBIOLOGY:  Culture - Other (17 @ 01:36)    Specimen Source: .Other Other, sacral wound    Culture Results:   Rare Morganella morganii    Culture - Blood (17 @ 15:31)    Specimen Source: .Blood Blood-Peripheral    Culture Results:   No growth to date.    Culture Results:   No growth to date. (17 @ 15:31)      RADIOLOGY:  < from: CT Pelvis w/ IV Cont (17 @ 16:31) >  IMPRESSION: Large ulceration in the soft tissues overlying and inferior   to the sacrum and coccyx extending down to level of bone. Associated   marked destruction of the lower sacrum and coccyx, consistent with   osteomyelitis. Adjacent abscesses within the medial aspect of the right   gluteal musculature.    < end of copied text >      Armando Yates MD; Division of Infectious Disease; Pager: [phone]; nights and weekends: [phone]
SUBJECTIVE / OVERNIGHT EVENTS: No nausea, vomiting or diarrhea, no fever or chills, no dizziness or chest pain, no dysuria or hematuria .      CAPILLARY BLOOD GLUCOSE  151 (24 Jul 2017 07:38)  230 (23 Jul 2017 21:48)  143 (23 Jul 2017 16:20)  205 (23 Jul 2017 11:58)        I&O's Summary    23 Jul 2017 07:01  -  24 Jul 2017 07:00  --------------------------------------------------------  IN: 360 mL / OUT: 1050 mL / NET: -690 mL        PHYSICAL EXAM:  HEAD:  Atraumatic, Normocephalic  EYES: EOMI, PERRLA, conjunctiva and sclera clear  NECK: Supple, No JVD  CHEST/LUNG: Clear to auscultation bilaterally; No wheeze  HEART: Regular rate and rhythm; No murmurs, rubs, or gallops  ABDOMEN: Soft, Nontender, Nondistended; Bowel sounds present  EXTREMITIES:  2+ Peripheral Pulses, No clubbing, cyanosis, or edema  PSYCH: AAOx3  NEUROLOGY: non-focal  SKIN: No rashes or lesions    MEDICATIONS  (STANDING):  carvedilol 12.5 milliGRAM(s) Oral every 12 hours  aspirin enteric coated 81 milliGRAM(s) Oral daily  insulin lispro (HumaLOG) corrective regimen sliding scale   SubCutaneous three times a day before meals  dextrose 5%. 1000 milliLiter(s) (50 mL/Hr) IV Continuous <Continuous>  dextrose 50% Injectable 12.5 Gram(s) IV Push once  dextrose 50% Injectable 25 Gram(s) IV Push once  dextrose 50% Injectable 25 Gram(s) IV Push once  heparin  Injectable 5000 Unit(s) SubCutaneous every 12 hours  piperacillin/tazobactam IVPB. 3.375 Gram(s) IV Intermittent every 8 hours  lisinopril 2.5 milliGRAM(s) Oral daily  Dakins Solution - 1/4 Strength 1 Application(s) Topical daily  multivitamin 1 Tablet(s) Oral daily  ascorbic acid 500 milliGRAM(s) Oral daily    MEDICATIONS  (PRN):  dextrose Gel 1 Dose(s) Oral once PRN Blood Glucose LESS THAN 70 milliGRAM(s)/deciliter  glucagon  Injectable 1 milliGRAM(s) IntraMuscular once PRN Glucose LESS THAN 70 milligrams/deciliter  oxyCODONE    5 mG/acetaminophen 325 mG 1 Tablet(s) Oral every 12 hours PRN Moderate Pain (4 - 6)      LABS:                        Hemoglobin A1C, Whole Blood: 6.9 % (07-19 @ 07:25)    Thyroid Stimulating Hormone, Serum: 0.61 uIU/mL (07-19 @ 07:30)      Cultures:    EKG:    Radiological Studies:    Consultant(s) Notes Reviewed:      Care Discussed with Consultants/Other Providers:
SUBJECTIVE / OVERNIGHT EVENTS: No nausea, vomiting or diarrhea, no fever or chills, no dizziness or chest pain, no dysuria or hematuria .      CAPILLARY BLOOD GLUCOSE  152 (23 Jul 2017 08:14)  173 (22 Jul 2017 22:15)  174 (22 Jul 2017 17:11)  223 (22 Jul 2017 11:56)        I&O's Summary    22 Jul 2017 07:01  -  23 Jul 2017 07:00  --------------------------------------------------------  IN: 800 mL / OUT: 850 mL / NET: -50 mL        PHYSICAL EXAM:  HEAD:  Atraumatic, Normocephalic  EYES: EOMI, PERRLA, conjunctiva and sclera clear  NECK: Supple, No JVD  CHEST/LUNG: Clear to auscultation bilaterally; No wheeze  HEART: Regular rate and rhythm; No murmurs, rubs, or gallops  ABDOMEN: Soft, Nontender, Nondistended; Bowel sounds present  EXTREMITIES:  2+ Peripheral Pulses, No clubbing, cyanosis, or edema  PSYCH: AAOx3  NEUROLOGY: non-focal  SKIN: No rashes or lesions    MEDICATIONS  (STANDING):  carvedilol 12.5 milliGRAM(s) Oral every 12 hours  aspirin enteric coated 81 milliGRAM(s) Oral daily  insulin lispro (HumaLOG) corrective regimen sliding scale   SubCutaneous three times a day before meals  dextrose 5%. 1000 milliLiter(s) (50 mL/Hr) IV Continuous <Continuous>  dextrose 50% Injectable 12.5 Gram(s) IV Push once  dextrose 50% Injectable 25 Gram(s) IV Push once  dextrose 50% Injectable 25 Gram(s) IV Push once  heparin  Injectable 5000 Unit(s) SubCutaneous every 12 hours  piperacillin/tazobactam IVPB. 3.375 Gram(s) IV Intermittent every 8 hours  lisinopril 2.5 milliGRAM(s) Oral daily  Dakins Solution - 1/4 Strength 1 Application(s) Topical daily  multivitamin 1 Tablet(s) Oral daily  ascorbic acid 500 milliGRAM(s) Oral daily    MEDICATIONS  (PRN):  dextrose Gel 1 Dose(s) Oral once PRN Blood Glucose LESS THAN 70 milliGRAM(s)/deciliter  glucagon  Injectable 1 milliGRAM(s) IntraMuscular once PRN Glucose LESS THAN 70 milligrams/deciliter  oxyCODONE    5 mG/acetaminophen 325 mG 1 Tablet(s) Oral every 12 hours PRN Moderate Pain (4 - 6)      LABS:                        8.1    18.50 )-----------( 619      ( 22 Jul 2017 08:58 )             24.5     07-21    136  |  104  |  16  ----------------------------<  229<H>  4.3   |  20<L>  |  0.71    Ca    8.8      21 Jul 2017 10:53  Phos  2.3     07-21  Mg     1.7     07-21                    Hemoglobin A1C, Whole Blood: 6.9 % (07-19 @ 07:25)    Thyroid Stimulating Hormone, Serum: 0.61 uIU/mL (07-19 @ 07:30)      Cultures:    EKG:    Radiological Studies:    Consultant(s) Notes Reviewed:      Care Discussed with Consultants/Other Providers:
SUBJECTIVE / OVERNIGHT EVENTS: No nausea, vomiting or diarrhea, no fever or chills, no dizziness or chest pain, no dysuria or hematuria .      CAPILLARY BLOOD GLUCOSE  183 (26 Jul 2017 07:39)  209 (25 Jul 2017 21:33)  153 (25 Jul 2017 16:41)  214 (25 Jul 2017 12:04)        I&O's Summary    25 Jul 2017 07:01  -  26 Jul 2017 07:00  --------------------------------------------------------  IN: 1380 mL / OUT: 1425 mL / NET: -45 mL        PHYSICAL EXAM:  HEAD:  Atraumatic, Normocephalic  EYES: EOMI, PERRLA, conjunctiva and sclera clear  NECK: Supple, No JVD  CHEST/LUNG: Clear to auscultation bilaterally; No wheeze  HEART: Regular rate and rhythm; No murmurs, rubs, or gallops  ABDOMEN: Soft, Nontender, Nondistended; Bowel sounds present  EXTREMITIES:  2+ Peripheral Pulses, No clubbing, cyanosis, or edema  PSYCH: AAOx3  NEUROLOGY: non-focal  SKIN: No rashes or lesions    MEDICATIONS  (STANDING):  carvedilol 12.5 milliGRAM(s) Oral every 12 hours  aspirin enteric coated 81 milliGRAM(s) Oral daily  insulin lispro (HumaLOG) corrective regimen sliding scale   SubCutaneous three times a day before meals  dextrose 5%. 1000 milliLiter(s) (50 mL/Hr) IV Continuous <Continuous>  dextrose 50% Injectable 12.5 Gram(s) IV Push once  dextrose 50% Injectable 25 Gram(s) IV Push once  dextrose 50% Injectable 25 Gram(s) IV Push once  heparin  Injectable 5000 Unit(s) SubCutaneous every 12 hours  lisinopril 2.5 milliGRAM(s) Oral daily  Dakins Solution - 1/4 Strength 1 Application(s) Topical daily  multivitamin 1 Tablet(s) Oral daily  ascorbic acid 500 milliGRAM(s) Oral daily  ertapenem  IVPB 1000 milliGRAM(s) IV Intermittent every 24 hours    MEDICATIONS  (PRN):  dextrose Gel 1 Dose(s) Oral once PRN Blood Glucose LESS THAN 70 milliGRAM(s)/deciliter  glucagon  Injectable 1 milliGRAM(s) IntraMuscular once PRN Glucose LESS THAN 70 milligrams/deciliter      LABS:                        7.7    12.9  )-----------( 479      ( 26 Jul 2017 06:35 )             24.6       Phos  2.8     07-26  Mg     1.6     07-26                    Hemoglobin A1C, Whole Blood: 6.9 % (07-19 @ 07:25)    Thyroid Stimulating Hormone, Serum: 0.61 uIU/mL (07-19 @ 07:30)      Cultures:    EKG:    Radiological Studies:    Consultant(s) Notes Reviewed:      Care Discussed with Consultants/Other Providers:
SUBJECTIVE / OVERNIGHT EVENTS: No nausea, vomiting or diarrhea, no fever or chills, no dizziness or chest pain, no dysuria or hematuria .      CAPILLARY BLOOD GLUCOSE  189 (25 Jul 2017 07:27)  234 (24 Jul 2017 21:46)  207 (24 Jul 2017 16:40)  199 (24 Jul 2017 12:02)        I&O's Summary    24 Jul 2017 07:01  -  25 Jul 2017 07:00  --------------------------------------------------------  IN: 760 mL / OUT: 350 mL / NET: 410 mL        PHYSICAL EXAM:  HEAD:  Atraumatic, Normocephalic  EYES: EOMI, PERRLA, conjunctiva and sclera clear  NECK: Supple, No JVD  CHEST/LUNG: Clear to auscultation bilaterally; No wheeze  HEART: Regular rate and rhythm; No murmurs, rubs, or gallops  ABDOMEN: Soft, Nontender, Nondistended; Bowel sounds present  EXTREMITIES:  2+ Peripheral Pulses, No clubbing, cyanosis, or edema  PSYCH: AAOx3  NEUROLOGY: non-focal  SKIN: No rashes or lesions    MEDICATIONS  (STANDING):  carvedilol 12.5 milliGRAM(s) Oral every 12 hours  aspirin enteric coated 81 milliGRAM(s) Oral daily  insulin lispro (HumaLOG) corrective regimen sliding scale   SubCutaneous three times a day before meals  dextrose 5%. 1000 milliLiter(s) (50 mL/Hr) IV Continuous <Continuous>  dextrose 50% Injectable 12.5 Gram(s) IV Push once  dextrose 50% Injectable 25 Gram(s) IV Push once  dextrose 50% Injectable 25 Gram(s) IV Push once  heparin  Injectable 5000 Unit(s) SubCutaneous every 12 hours  lisinopril 2.5 milliGRAM(s) Oral daily  Dakins Solution - 1/4 Strength 1 Application(s) Topical daily  multivitamin 1 Tablet(s) Oral daily  ascorbic acid 500 milliGRAM(s) Oral daily  ertapenem  IVPB 1000 milliGRAM(s) IV Intermittent every 24 hours    MEDICATIONS  (PRN):  dextrose Gel 1 Dose(s) Oral once PRN Blood Glucose LESS THAN 70 milliGRAM(s)/deciliter  glucagon  Injectable 1 milliGRAM(s) IntraMuscular once PRN Glucose LESS THAN 70 milligrams/deciliter  oxyCODONE    5 mG/acetaminophen 325 mG 1 Tablet(s) Oral every 12 hours PRN Moderate Pain (4 - 6)      LABS:                        7.7    15.17 )-----------( 554      ( 25 Jul 2017 08:46 )             24.6       Phos  2.4     07-25  Mg     1.4     07-25                    Hemoglobin A1C, Whole Blood: 6.9 % (07-19 @ 07:25)    Thyroid Stimulating Hormone, Serum: 0.61 uIU/mL (07-19 @ 07:30)      Cultures:    EKG:    Radiological Studies:    Consultant(s) Notes Reviewed:      Care Discussed with Consultants/Other Providers:
SUBJECTIVE / OVERNIGHT EVENTS: No nausea, vomiting or diarrhea, no fever or chills, no dizziness or chest pain, no dysuria or hematuria .      CAPILLARY BLOOD GLUCOSE  193 (22 Jul 2017 08:19)  165 (21 Jul 2017 21:28)  193 (21 Jul 2017 17:11)  227 (21 Jul 2017 11:49)        I&O's Summary    21 Jul 2017 07:01  -  22 Jul 2017 07:00  --------------------------------------------------------  IN: 1270 mL / OUT: 1350 mL / NET: -80 mL        PHYSICAL EXAM:  HEAD:  Atraumatic, Normocephalic  EYES: EOMI, PERRLA, conjunctiva and sclera clear  NECK: Supple, No JVD  CHEST/LUNG: Clear to auscultation bilaterally; No wheeze  HEART: Regular rate and rhythm; No murmurs, rubs, or gallops  ABDOMEN: Soft, Nontender, Nondistended; Bowel sounds present  EXTREMITIES:  2+ Peripheral Pulses, No clubbing, cyanosis, or edema  PSYCH: AAOx3  NEUROLOGY: non-focal  SKIN: No rashes or lesions    MEDICATIONS  (STANDING):  carvedilol 12.5 milliGRAM(s) Oral every 12 hours  aspirin enteric coated 81 milliGRAM(s) Oral daily  insulin lispro (HumaLOG) corrective regimen sliding scale   SubCutaneous three times a day before meals  dextrose 5%. 1000 milliLiter(s) (50 mL/Hr) IV Continuous <Continuous>  dextrose 50% Injectable 12.5 Gram(s) IV Push once  dextrose 50% Injectable 25 Gram(s) IV Push once  dextrose 50% Injectable 25 Gram(s) IV Push once  heparin  Injectable 5000 Unit(s) SubCutaneous every 12 hours  piperacillin/tazobactam IVPB. 3.375 Gram(s) IV Intermittent every 8 hours  lisinopril 2.5 milliGRAM(s) Oral daily  Dakins Solution - 1/4 Strength 1 Application(s) Topical daily  multivitamin 1 Tablet(s) Oral daily  ascorbic acid 500 milliGRAM(s) Oral daily    MEDICATIONS  (PRN):  dextrose Gel 1 Dose(s) Oral once PRN Blood Glucose LESS THAN 70 milliGRAM(s)/deciliter  glucagon  Injectable 1 milliGRAM(s) IntraMuscular once PRN Glucose LESS THAN 70 milligrams/deciliter  oxyCODONE    5 mG/acetaminophen 325 mG 1 Tablet(s) Oral every 12 hours PRN Moderate Pain (4 - 6)      LABS:                        8.6    19.3  )-----------( 473      ( 21 Jul 2017 10:53 )             26.2     07-21    136  |  104  |  16  ----------------------------<  229<H>  4.3   |  20<L>  |  0.71    Ca    8.8      21 Jul 2017 10:53  Phos  2.3     07-21  Mg     1.7     07-21                    Hemoglobin A1C, Whole Blood: 6.9 % (07-19 @ 07:25)    Thyroid Stimulating Hormone, Serum: 0.61 uIU/mL (07-19 @ 07:30)      Cultures:    EKG:    Radiological Studies:    Consultant(s) Notes Reviewed:      Care Discussed with Consultants/Other Providers:
SUBJECTIVE / OVERNIGHT EVENTS: No nausea, vomiting or diarrhea, no fever or chills, no dizziness or chest pain, no dysuria or hematuria .    Vital Signs Last 24 Hrs  T(C): 36.9 (17 @ 04:21), Max: 37.1 (17 @ 11:49)  HR: 64 (17 @ 04:21) (64 - 68)  BP: 120/64 (17 @ 04:21) (104/62 - 120/64)  RR: 18 (17 @ 04:21) (18 - 18)  SpO2: 99% (17 @ 04:21) (98% - 99%)  CAPILLARY BLOOD GLUCOSE  181 (2017 07:39)  206 (2017 21:08)  176 (2017 16:11)  217 (2017 11:49)        I&O's Summary    2017 07:01  -  2017 07:00  --------------------------------------------------------  IN: 1050 mL / OUT: 850 mL / NET: 200 mL        PHYSICAL EXAM:  HEAD:  Atraumatic, Normocephalic  EYES: EOMI, PERRLA, conjunctiva and sclera clear  NECK: Supple, No JVD  CHEST/LUNG: Clear to auscultation bilaterally; No wheeze  HEART: Regular rate and rhythm; No murmurs, rubs, or gallops  ABDOMEN: Soft, Nontender, Nondistended; Bowel sounds present  EXTREMITIES:  2+ Peripheral Pulses, No clubbing, cyanosis, or edema  PSYCH: AAOx3  NEUROLOGY: non-focal  SKIN: No rashes or lesions    MEDICATIONS  (STANDING):  carvedilol 12.5 milliGRAM(s) Oral every 12 hours  aspirin enteric coated 81 milliGRAM(s) Oral daily  insulin lispro (HumaLOG) corrective regimen sliding scale   SubCutaneous three times a day before meals  dextrose 5%. 1000 milliLiter(s) (50 mL/Hr) IV Continuous <Continuous>  dextrose 50% Injectable 12.5 Gram(s) IV Push once  dextrose 50% Injectable 25 Gram(s) IV Push once  dextrose 50% Injectable 25 Gram(s) IV Push once  heparin  Injectable 5000 Unit(s) SubCutaneous every 12 hours  piperacillin/tazobactam IVPB. 3.375 Gram(s) IV Intermittent every 8 hours  vancomycin  IVPB 1000 milliGRAM(s) IV Intermittent every 24 hours  lisinopril 2.5 milliGRAM(s) Oral daily  Dakins Solution - 1/4 Strength 1 Application(s) Topical daily    MEDICATIONS  (PRN):  dextrose Gel 1 Dose(s) Oral once PRN Blood Glucose LESS THAN 70 milliGRAM(s)/deciliter  glucagon  Injectable 1 milliGRAM(s) IntraMuscular once PRN Glucose LESS THAN 70 milligrams/deciliter  oxyCODONE    5 mG/acetaminophen 325 mG 1 Tablet(s) Oral every 12 hours PRN Moderate Pain (4 - 6)      LABS:                        8.0    20.65 )-----------( 612      ( 2017 07:23 )             24.2                 Urinalysis Basic - ( 2017 07:50 )    Color: Yellow / Appearance: x / S.022 / pH: x  Gluc: x / Ketone: Negative  / Bili: Negative / Urobili: Negative   Blood: x / Protein: 100 mg/dL / Nitrite: Negative   Leuk Esterase: Large / RBC: 2-5 /HPF / WBC >50 /HPF   Sq Epi: x / Non Sq Epi: x / Bacteria: Few /HPF            Hemoglobin A1C, Whole Blood: 6.9 % ( @ 07:25)    Thyroid Stimulating Hormone, Serum: 0.61 uIU/mL ( @ 07:30)      Cultures:    EKG:    Radiological Studies:    Consultant(s) Notes Reviewed:      Care Discussed with Consultants/Other Providers:
SUBJECTIVE / OVERNIGHT EVENTS: No nausea, vomiting or diarrhea, no fever or chills, no dizziness or chest pain, no dysuria or hematuria .    Vital Signs Last 24 Hrs  T(C): 37.1 (17 @ 03:52), Max: 37.2 (17 @ 18:42)  HR: 72 (17 @ 03:52) (67 - 72)  BP: 103/60 (17 @ 03:52) (101/62 - 119/54)  RR: 18 (17 @ 03:52) (18 - 18)  SpO2: 100% (17 @ 03:52) (96% - 100%)  CAPILLARY BLOOD GLUCOSE  196 (2017 07:22)  165 (2017 22:37)  159 (2017 17:07)  154 (2017 11:56)        I&O's Summary    2017 07:  -  2017 07:00  --------------------------------------------------------  IN: 1030 mL / OUT: 450 mL / NET: 580 mL    2017 07:01  -  2017 11:05  --------------------------------------------------------  IN: 120 mL / OUT: 0 mL / NET: 120 mL        PHYSICAL EXAM:  HEAD:  Atraumatic, Normocephalic  EYES: EOMI, PERRLA, conjunctiva and sclera clear  NECK: Supple, No JVD  CHEST/LUNG: Clear to auscultation bilaterally; No wheeze  HEART: Regular rate and rhythm; No murmurs, rubs, or gallops  ABDOMEN: Soft, Nontender, Nondistended; Bowel sounds present  EXTREMITIES:  2+ Peripheral Pulses, No clubbing, cyanosis, or edema  PSYCH: AAOx3  NEUROLOGY: non-focal  SKIN: No rashes or lesions    MEDICATIONS  (STANDING):  carvedilol 12.5 milliGRAM(s) Oral every 12 hours  aspirin enteric coated 81 milliGRAM(s) Oral daily  insulin lispro (HumaLOG) corrective regimen sliding scale   SubCutaneous three times a day before meals  dextrose 5%. 1000 milliLiter(s) (50 mL/Hr) IV Continuous <Continuous>  dextrose 50% Injectable 12.5 Gram(s) IV Push once  dextrose 50% Injectable 25 Gram(s) IV Push once  dextrose 50% Injectable 25 Gram(s) IV Push once  heparin  Injectable 5000 Unit(s) SubCutaneous every 12 hours  piperacillin/tazobactam IVPB. 3.375 Gram(s) IV Intermittent every 8 hours  vancomycin  IVPB 1000 milliGRAM(s) IV Intermittent every 24 hours  lisinopril 2.5 milliGRAM(s) Oral daily    MEDICATIONS  (PRN):  dextrose Gel 1 Dose(s) Oral once PRN Blood Glucose LESS THAN 70 milliGRAM(s)/deciliter  glucagon  Injectable 1 milliGRAM(s) IntraMuscular once PRN Glucose LESS THAN 70 milligrams/deciliter  oxyCODONE    5 mG/acetaminophen 325 mG 1 Tablet(s) Oral every 12 hours PRN Moderate Pain (4 - 6)      LABS:                        8.0    20.65 )-----------( 612      ( 2017 07:23 )             24.2     07-19    140  |  106  |  39<H>  ----------------------------<  151<H>  4.5   |  17<L>  |  1.10    Ca    9.4      2017 07:30    TPro  6.0  /  Alb  2.9<L>  /  TBili  0.4  /  DBili  x   /  AST  20  /  ALT  15  /  AlkPhos  81  07-18    PT/INR - ( 2017 12:15 )   PT: 14.8 sec;   INR: 1.35 ratio         PTT - ( 2017 12:15 )  PTT:24.3 sec      Urinalysis Basic - ( 2017 07:50 )    Color: Yellow / Appearance: x / S.022 / pH: x  Gluc: x / Ketone: Negative  / Bili: Negative / Urobili: Negative   Blood: x / Protein: 100 mg/dL / Nitrite: Negative   Leuk Esterase: Large / RBC: 2-5 /HPF / WBC >50 /HPF   Sq Epi: x / Non Sq Epi: x / Bacteria: Few /HPF           @ 12:15  7.2  39    Hemoglobin A1C, Whole Blood: 6.9 % ( @ 07:25)    Thyroid Stimulating Hormone, Serum: 0.61 uIU/mL ( @ 07:30)      Cultures:    EKG:    Radiological Studies:    Consultant(s) Notes Reviewed:      Care Discussed with Consultants/Other Providers:
SUBJECTIVE / OVERNIGHT EVENTS: No nausea, vomiting or diarrhea, no fever or chills, no dizziness or chest pain, no dysuria or hematuria .    Vital Signs Last 24 Hrs  T(C): 37.2 (07-19-17 @ 04:33), Max: 37.2 (07-18-17 @ 11:40)  HR: 71 (07-19-17 @ 04:33) (60 - 84)  BP: 112/56 (07-19-17 @ 04:33) (95/50 - 116/69)  RR: 18 (07-19-17 @ 04:33) (16 - 18)  SpO2: 99% (07-19-17 @ 04:33) (96% - 100%)  CAPILLARY BLOOD GLUCOSE  204 (19 Jul 2017 07:20)  153 (18 Jul 2017 22:50)  141 (18 Jul 2017 18:04)        I&O's Summary    18 Jul 2017 07:01  -  19 Jul 2017 07:00  --------------------------------------------------------  IN: 690 mL / OUT: 0 mL / NET: 690 mL    19 Jul 2017 07:01  -  19 Jul 2017 11:16  --------------------------------------------------------  IN: 120 mL / OUT: 0 mL / NET: 120 mL        PHYSICAL EXAM:  HEAD:  Atraumatic, Normocephalic  EYES: EOMI, PERRLA, conjunctiva and sclera clear  NECK: Supple, No JVD  CHEST/LUNG: Clear to auscultation bilaterally; No wheeze  HEART: Regular rate and rhythm; No murmurs, rubs, or gallops  ABDOMEN: Soft, Nontender, Nondistended; Bowel sounds present  EXTREMITIES:  2+ Peripheral Pulses, No clubbing, cyanosis, or edema  PSYCH: AAOx3  NEUROLOGY: non-focal, stage 4 decubitus  ulcer  SKIN: No rashes or lesions    MEDICATIONS  (STANDING):  carvedilol 12.5 milliGRAM(s) Oral every 12 hours  aspirin enteric coated 81 milliGRAM(s) Oral daily  insulin lispro (HumaLOG) corrective regimen sliding scale   SubCutaneous three times a day before meals  dextrose 5%. 1000 milliLiter(s) (50 mL/Hr) IV Continuous <Continuous>  dextrose 50% Injectable 12.5 Gram(s) IV Push once  dextrose 50% Injectable 25 Gram(s) IV Push once  dextrose 50% Injectable 25 Gram(s) IV Push once  heparin  Injectable 5000 Unit(s) SubCutaneous every 12 hours  piperacillin/tazobactam IVPB. 3.375 Gram(s) IV Intermittent every 8 hours  lisinopril 10 milliGRAM(s) Oral daily  vancomycin  IVPB 1000 milliGRAM(s) IV Intermittent every 24 hours    MEDICATIONS  (PRN):  dextrose Gel 1 Dose(s) Oral once PRN Blood Glucose LESS THAN 70 milliGRAM(s)/deciliter  glucagon  Injectable 1 milliGRAM(s) IntraMuscular once PRN Glucose LESS THAN 70 milligrams/deciliter  oxyCODONE    5 mG/acetaminophen 325 mG 1 Tablet(s) Oral every 12 hours PRN Moderate Pain (4 - 6)      LABS:                        7.4    23.78 )-----------( 620      ( 19 Jul 2017 07:25 )             22.4     07-19    140  |  106  |  39<H>  ----------------------------<  151<H>  4.5   |  17<L>  |  1.10    Ca    9.4      19 Jul 2017 07:30    TPro  6.0  /  Alb  2.9<L>  /  TBili  0.4  /  DBili  x   /  AST  20  /  ALT  15  /  AlkPhos  81  07-18    PT/INR - ( 18 Jul 2017 12:15 )   PT: 14.8 sec;   INR: 1.35 ratio         PTT - ( 18 Jul 2017 12:15 )  PTT:24.3 sec            07-18 @ 12:15  7.2  39    Hemoglobin A1C, Whole Blood: 6.9 % (07-19 @ 07:25)        Cultures:    EKG:    Radiological Studies:    Consultant(s) Notes Reviewed:      Care Discussed with Consultants/Other Providers:
SUBJECTIVE: Pt seen, chart reviewed.  C/o foul odor. pt w/ medihoney dressing    ROS: pt unable to offer complaints    aspirin enteric coated 81 milliGRAM(s) Oral daily  heparin  Injectable 5000 Unit(s) SubCutaneous every 12 hours  piperacillin/tazobactam IVPB. 3.375 Gram(s) IV Intermittent every 8 hours  vancomycin  IVPB 1000 milliGRAM(s) IV Intermittent every 24 hours      Physical Exam:  Vital Signs Last 24 Hrs  T(C): 37.1 (20 Jul 2017 11:49), Max: 37.2 (19 Jul 2017 18:42)  T(F): 98.8 (20 Jul 2017 11:49), Max: 99 (19 Jul 2017 18:42)  HR: 66 (20 Jul 2017 11:49) (66 - 72)  BP: 104/62 (20 Jul 2017 11:49) (101/62 - 119/54)  BP(mean): --  RR: 18 (20 Jul 2017 11:49) (18 - 18)  SpO2: 98% (20 Jul 2017 11:49) (96% - 100%)    General Appearance:  NAD, Alert, MO  Versa Care P500  Skin:  moist w/ good turgor  Sacral Stage IV pressure ulcer (+) 20% moist & granular,  80% necrotic and slough debulked  9.5cm x 9cm x 6cm after debridement- undermining now noted from 6-3 o'clock w/greatest of 7cm tunnel at 6 o'clock into Lt buttock and 6 cm at 2-3 o'clcok   serosanguinous drainage  (+)malodor  (+)exposed bone-rough edges  No erythema, increased warmth, tenderness, induration, fluctuance        LABS:                        8.0    20.65 )-----------( 612      ( 20 Jul 2017 07:23 )             24.2
Follow Up:  Large sacral decub.    Interval History/ROS: Soft stool but not liquid today.   Afebrile. W/o pain today.  Stomach pain noted yesterday.    Allergies  Sinemet (Unknown)    ANTIMICROBIALS:      piperacillin/tazobactam IVPB. 3.375 every 8 hours  vancomycin  IVPB 1000 every 24 hours    OTHER MEDS:  MEDICATIONS  (STANDING):  MEDICATIONS  (STANDING):  carvedilol 12.5 every 12 hours  aspirin enteric coated 81 daily  insulin lispro (HumaLOG) corrective regimen sliding scale  three times a day before meals  dextrose Gel 1 once PRN  dextrose 50% Injectable 12.5 once  dextrose 50% Injectable 25 once  dextrose 50% Injectable 25 once  glucagon  Injectable 1 once PRN  heparin  Injectable 5000 every 12 hours  oxyCODONE    5 mG/acetaminophen 325 mG 1 every 12 hours PRN  lisinopril 2.5 daily      Vital Signs Last 24 Hrs  T(F): 98.4 (17 @ 04:21), Max: 98.8 (17 @ 11:49)  HR: 64 (17 @ 04:21)  BP: 120/64 (17 @ 04:21)  RR: 18 (17 @ 04:21)  SpO2: 99% (17 @ 04:21) (98% - 99%)  Wt(kg): --    PHYSICAL EXAM:  General: WN/WD NAD, Non-toxic.   Neurology: A&Ox3, nonfocal, decreased sponteneous movement, soft voice  Respiratory: Clear to auscultation bilaterally  CV: RRR, S1S2, no murmurs, rubs or gallops  Abdominal: Soft, Non-tender, non-distended, normal bowel sounds  Extremities: No edema, in Z flow boots  Line Sites: Clear peripheral iv site  Skin: No rash                                      8.0    20.65 )-----------( 612      ( 2017 07:23 )             24.2           Urinalysis Basic - ( 2017 07:50 )    Color: Yellow / Appearance: x / S.022 / pH: x  Gluc: x / Ketone: Negative  / Bili: Negative / Urobili: Negative   Blood: x / Protein: 100 mg/dL / Nitrite: Negative   Leuk Esterase: Large / RBC: 2-5 /HPF / WBC >50 /HPF   Sq Epi: x / Non Sq Epi: x / Bacteria: Few /HPF    MICROBIOLOGY:  Culture - Other (17 @ 01:36)    Specimen Source: .Other Other, sacral wound    Culture Results:   Rare Morganella morganii    Culture - Other (17 @ 01:36)    -  Amikacin: S <=8    -  Cefoxitin: I 16    -  Ciprofloxacin: R >2    -  Tobramycin: S <=2    -  Ampicillin/Sulbactam: R >16/8    -  Cefazolin: R >16    -  Gentamicin: R >8    -  Imipenem: S <=1    -  Levofloxacin: R >4    -  Meropenem: S <=1    -  Trimethoprim/Sulfamethoxazole: S <=0.5/9.5    -  Ampicillin: R >16    -  Aztreonam: S <=4    -  Ceftazidime: S <=1    -  Cefepime: S 8    -  Ceftriaxone: R 8    -  Ertapenem: S <=0.5    -  Piperacillin/Tazobactam: S <=8    Specimen Source: .Other Other, sacral wound    Culture Results:   Rare Morganella morganii  Moderate Corynebacterium species    Organism Identification: Morganella morganii    Organism: Morganella morganii    Method Type: TERI    Culture - Blood (17 @ 15:31)    Specimen Source: .Blood Blood-Peripheral    Culture Results:   No growth to date.    Culture - Blood (17 @ 15:31)    Specimen Source: .Blood Blood-Peripheral    Culture Results:   No growth to date.          RADIOLOGY:  < from: CT Pelvis w/ IV Cont (17 @ 16:31) >  IMPRESSION: Large ulceration in the soft tissues overlying and inferior   to the sacrum and coccyx extending down to level of bone. Associated   marked destruction of the lower sacrum and coccyx, consistent with   osteomyelitis. Adjacent abscesses within the medial aspect of the right   gluteal musculature.    < end of copied text >

## 2017-07-26 NOTE — PROGRESS NOTE ADULT - ASSESSMENT
much improved wound - as per wound care, now appropriate for VAC dressing - with care to assure that undermining lateral recesses are packed completely.  Leukocytosis improved.  -Daughter at bedside also unsure why patient lost ability to ambulate this past May and stated she was just beginning to make progress in rehab.  Suggest: Continue Ertarpenem 1 gm ivss daily through 7/31

## 2017-07-31 LAB — SURGICAL PATHOLOGY STUDY: SIGNIFICANT CHANGE UP

## 2017-11-15 PROBLEM — Z00.00 ENCOUNTER FOR PREVENTIVE HEALTH EXAMINATION: Status: ACTIVE | Noted: 2017-11-15

## 2017-12-16 ENCOUNTER — INPATIENT (INPATIENT)
Facility: HOSPITAL | Age: 82
LOS: 5 days | Discharge: NURSING, REHAB OR LONG-TERM CARE | DRG: 377 | End: 2017-12-22
Attending: INTERNAL MEDICINE | Admitting: EMERGENCY MEDICINE
Payer: MEDICARE

## 2017-12-16 VITALS
HEART RATE: 60 BPM | OXYGEN SATURATION: 100 % | RESPIRATION RATE: 17 BRPM | SYSTOLIC BLOOD PRESSURE: 160 MMHG | DIASTOLIC BLOOD PRESSURE: 63 MMHG

## 2017-12-16 DIAGNOSIS — D64.9 ANEMIA, UNSPECIFIED: ICD-10-CM

## 2017-12-16 LAB
ALBUMIN SERPL ELPH-MCNC: 2.5 G/DL — LOW (ref 3.3–5)
ALP SERPL-CCNC: 62 U/L — SIGNIFICANT CHANGE UP (ref 40–120)
ALT FLD-CCNC: 5 U/L RC — LOW (ref 10–45)
ANION GAP SERPL CALC-SCNC: 10 MMOL/L — SIGNIFICANT CHANGE UP (ref 5–17)
ANISOCYTOSIS BLD QL: SLIGHT — SIGNIFICANT CHANGE UP
ANTIBODY ID 1_1: SIGNIFICANT CHANGE UP
ANTIBODY ID 1_2: SIGNIFICANT CHANGE UP
APTT BLD: 30.5 SEC — SIGNIFICANT CHANGE UP (ref 27.5–37.4)
AST SERPL-CCNC: 11 U/L — SIGNIFICANT CHANGE UP (ref 10–40)
BASE EXCESS BLDV CALC-SCNC: 5.5 MMOL/L — HIGH (ref -2–2)
BASOPHILS # BLD AUTO: 0.1 K/UL — SIGNIFICANT CHANGE UP (ref 0–0.2)
BASOPHILS NFR BLD AUTO: 0.8 % — SIGNIFICANT CHANGE UP (ref 0–2)
BILIRUB SERPL-MCNC: 0.1 MG/DL — LOW (ref 0.2–1.2)
BLD GP AB SCN SERPL QL: POSITIVE — SIGNIFICANT CHANGE UP
BUN SERPL-MCNC: 15 MG/DL — SIGNIFICANT CHANGE UP (ref 7–23)
CA-I SERPL-SCNC: 1.3 MMOL/L — SIGNIFICANT CHANGE UP (ref 1.12–1.3)
CALCIUM SERPL-MCNC: 8.9 MG/DL — SIGNIFICANT CHANGE UP (ref 8.4–10.5)
CHLORIDE BLDV-SCNC: 103 MMOL/L — SIGNIFICANT CHANGE UP (ref 96–108)
CHLORIDE SERPL-SCNC: 104 MMOL/L — SIGNIFICANT CHANGE UP (ref 96–108)
CO2 BLDV-SCNC: 32 MMOL/L — HIGH (ref 22–30)
CO2 SERPL-SCNC: 28 MMOL/L — SIGNIFICANT CHANGE UP (ref 22–31)
CREAT SERPL-MCNC: 0.45 MG/DL — LOW (ref 0.5–1.3)
DACRYOCYTES BLD QL SMEAR: SLIGHT — SIGNIFICANT CHANGE UP
DAT POLY-SP REAG RBC QL: NEGATIVE — SIGNIFICANT CHANGE UP
ELLIPTOCYTES BLD QL SMEAR: SLIGHT — SIGNIFICANT CHANGE UP
EOSINOPHIL # BLD AUTO: 0.1 K/UL — SIGNIFICANT CHANGE UP (ref 0–0.5)
EOSINOPHIL NFR BLD AUTO: 1.4 % — SIGNIFICANT CHANGE UP (ref 0–6)
GAS PNL BLDV: 139 MMOL/L — SIGNIFICANT CHANGE UP (ref 136–145)
GAS PNL BLDV: SIGNIFICANT CHANGE UP
GIANT PLATELETS BLD QL SMEAR: PRESENT — SIGNIFICANT CHANGE UP
GLUCOSE BLDC GLUCOMTR-MCNC: 131 MG/DL — HIGH (ref 70–99)
GLUCOSE BLDC GLUCOMTR-MCNC: 141 MG/DL — HIGH (ref 70–99)
GLUCOSE BLDC GLUCOMTR-MCNC: 150 MG/DL — HIGH (ref 70–99)
GLUCOSE BLDV-MCNC: 133 MG/DL — HIGH (ref 70–99)
GLUCOSE SERPL-MCNC: 132 MG/DL — HIGH (ref 70–99)
HCO3 BLDV-SCNC: 31 MMOL/L — HIGH (ref 21–29)
HCT VFR BLD CALC: 23.2 % — LOW (ref 34.5–45)
HCT VFR BLD CALC: 24.1 % — LOW (ref 34.5–45)
HCT VFR BLDA CALC: 21 % — CRITICAL LOW (ref 39–50)
HGB BLD CALC-MCNC: 6.6 G/DL — CRITICAL LOW (ref 11.5–15.5)
HGB BLD-MCNC: 6.7 G/DL — CRITICAL LOW (ref 11.5–15.5)
HGB BLD-MCNC: 7.6 G/DL — LOW (ref 11.5–15.5)
HYPOCHROMIA BLD QL: SIGNIFICANT CHANGE UP
INR BLD: 1.18 RATIO — HIGH (ref 0.88–1.16)
LACTATE BLDV-MCNC: 1.1 MMOL/L — SIGNIFICANT CHANGE UP (ref 0.7–2)
LYMPHOCYTES # BLD AUTO: 2.4 K/UL — SIGNIFICANT CHANGE UP (ref 1–3.3)
LYMPHOCYTES # BLD AUTO: 25.4 % — SIGNIFICANT CHANGE UP (ref 13–44)
MCHC RBC-ENTMCNC: 21.3 PG — LOW (ref 27–34)
MCHC RBC-ENTMCNC: 23.7 PG — LOW (ref 27–34)
MCHC RBC-ENTMCNC: 28.8 GM/DL — LOW (ref 32–36)
MCHC RBC-ENTMCNC: 31.5 GM/DL — LOW (ref 32–36)
MCV RBC AUTO: 74 FL — LOW (ref 80–100)
MCV RBC AUTO: 75 FL — LOW (ref 80–100)
MONOCYTES # BLD AUTO: 0.7 K/UL — SIGNIFICANT CHANGE UP (ref 0–0.9)
MONOCYTES NFR BLD AUTO: 7.7 % — SIGNIFICANT CHANGE UP (ref 2–14)
NEUTROPHILS # BLD AUTO: 6 K/UL — SIGNIFICANT CHANGE UP (ref 1.8–7.4)
NEUTROPHILS NFR BLD AUTO: 64.7 % — SIGNIFICANT CHANGE UP (ref 43–77)
PCO2 BLDV: 54 MMHG — HIGH (ref 35–50)
PH BLDV: 7.37 — SIGNIFICANT CHANGE UP (ref 7.35–7.45)
PLAT MORPH BLD: ABNORMAL
PLATELET # BLD AUTO: 461 K/UL — HIGH (ref 150–400)
PLATELET # BLD AUTO: 522 K/UL — HIGH (ref 150–400)
PO2 BLDV: 28 MMHG — SIGNIFICANT CHANGE UP (ref 25–45)
POIKILOCYTOSIS BLD QL AUTO: SIGNIFICANT CHANGE UP
POLYCHROMASIA BLD QL SMEAR: SLIGHT — SIGNIFICANT CHANGE UP
POTASSIUM BLDV-SCNC: 3.9 MMOL/L — SIGNIFICANT CHANGE UP (ref 3.5–5)
POTASSIUM SERPL-MCNC: 4 MMOL/L — SIGNIFICANT CHANGE UP (ref 3.5–5.3)
POTASSIUM SERPL-SCNC: 4 MMOL/L — SIGNIFICANT CHANGE UP (ref 3.5–5.3)
PROT SERPL-MCNC: 6.3 G/DL — SIGNIFICANT CHANGE UP (ref 6–8.3)
PROTHROM AB SERPL-ACNC: 12.8 SEC — HIGH (ref 9.8–12.7)
RBC # BLD: 3.14 M/UL — LOW (ref 3.8–5.2)
RBC # BLD: 3.21 M/UL — LOW (ref 3.8–5.2)
RBC # FLD: 17.4 % — HIGH (ref 10.3–14.5)
RBC # FLD: 17.7 % — HIGH (ref 10.3–14.5)
RBC BLD AUTO: ABNORMAL
RH IG SCN BLD-IMP: NEGATIVE — SIGNIFICANT CHANGE UP
SAO2 % BLDV: 40 % — LOW (ref 67–88)
SODIUM SERPL-SCNC: 142 MMOL/L — SIGNIFICANT CHANGE UP (ref 135–145)
TARGETS BLD QL SMEAR: SLIGHT — SIGNIFICANT CHANGE UP
WBC # BLD: 10.5 K/UL — SIGNIFICANT CHANGE UP (ref 3.8–10.5)
WBC # BLD: 9.3 K/UL — SIGNIFICANT CHANGE UP (ref 3.8–10.5)
WBC # FLD AUTO: 10.5 K/UL — SIGNIFICANT CHANGE UP (ref 3.8–10.5)
WBC # FLD AUTO: 9.3 K/UL — SIGNIFICANT CHANGE UP (ref 3.8–10.5)

## 2017-12-16 PROCEDURE — 99285 EMERGENCY DEPT VISIT HI MDM: CPT | Mod: GC

## 2017-12-16 PROCEDURE — 86077 PHYS BLOOD BANK SERV XMATCH: CPT

## 2017-12-16 RX ORDER — CARVEDILOL PHOSPHATE 80 MG/1
12.5 CAPSULE, EXTENDED RELEASE ORAL EVERY 12 HOURS
Qty: 0 | Refills: 0 | Status: DISCONTINUED | OUTPATIENT
Start: 2017-12-16 | End: 2017-12-22

## 2017-12-16 RX ORDER — SODIUM CHLORIDE 9 MG/ML
1000 INJECTION, SOLUTION INTRAVENOUS
Qty: 0 | Refills: 0 | Status: DISCONTINUED | OUTPATIENT
Start: 2017-12-16 | End: 2017-12-22

## 2017-12-16 RX ORDER — DEXTROSE 50 % IN WATER 50 %
25 SYRINGE (ML) INTRAVENOUS ONCE
Qty: 0 | Refills: 0 | Status: DISCONTINUED | OUTPATIENT
Start: 2017-12-16 | End: 2017-12-22

## 2017-12-16 RX ORDER — INSULIN LISPRO 100/ML
VIAL (ML) SUBCUTANEOUS
Qty: 0 | Refills: 0 | Status: DISCONTINUED | OUTPATIENT
Start: 2017-12-16 | End: 2017-12-22

## 2017-12-16 RX ORDER — SODIUM CHLORIDE 9 MG/ML
1000 INJECTION INTRAMUSCULAR; INTRAVENOUS; SUBCUTANEOUS
Qty: 0 | Refills: 0 | Status: DISCONTINUED | OUTPATIENT
Start: 2017-12-16 | End: 2017-12-21

## 2017-12-16 RX ORDER — DEXTROSE 50 % IN WATER 50 %
1 SYRINGE (ML) INTRAVENOUS ONCE
Qty: 0 | Refills: 0 | Status: DISCONTINUED | OUTPATIENT
Start: 2017-12-16 | End: 2017-12-22

## 2017-12-16 RX ORDER — LISINOPRIL 2.5 MG/1
2.5 TABLET ORAL DAILY
Qty: 0 | Refills: 0 | Status: DISCONTINUED | OUTPATIENT
Start: 2017-12-16 | End: 2017-12-17

## 2017-12-16 RX ORDER — GLUCAGON INJECTION, SOLUTION 0.5 MG/.1ML
1 INJECTION, SOLUTION SUBCUTANEOUS ONCE
Qty: 0 | Refills: 0 | Status: DISCONTINUED | OUTPATIENT
Start: 2017-12-16 | End: 2017-12-22

## 2017-12-16 RX ORDER — DEXTROSE 50 % IN WATER 50 %
12.5 SYRINGE (ML) INTRAVENOUS ONCE
Qty: 0 | Refills: 0 | Status: DISCONTINUED | OUTPATIENT
Start: 2017-12-16 | End: 2017-12-22

## 2017-12-16 RX ORDER — PANTOPRAZOLE SODIUM 20 MG/1
40 TABLET, DELAYED RELEASE ORAL DAILY
Qty: 0 | Refills: 0 | Status: DISCONTINUED | OUTPATIENT
Start: 2017-12-16 | End: 2017-12-22

## 2017-12-16 RX ADMIN — CARVEDILOL PHOSPHATE 12.5 MILLIGRAM(S): 80 CAPSULE, EXTENDED RELEASE ORAL at 21:13

## 2017-12-16 RX ADMIN — CARVEDILOL PHOSPHATE 12.5 MILLIGRAM(S): 80 CAPSULE, EXTENDED RELEASE ORAL at 12:44

## 2017-12-16 RX ADMIN — PANTOPRAZOLE SODIUM 40 MILLIGRAM(S): 20 TABLET, DELAYED RELEASE ORAL at 18:44

## 2017-12-16 RX ADMIN — SODIUM CHLORIDE 50 MILLILITER(S): 9 INJECTION INTRAMUSCULAR; INTRAVENOUS; SUBCUTANEOUS at 17:46

## 2017-12-16 RX ADMIN — LISINOPRIL 2.5 MILLIGRAM(S): 2.5 TABLET ORAL at 12:44

## 2017-12-16 NOTE — H&P ADULT - ASSESSMENT
pt  with  htn, bed bound, non healing stage  4 sacral decubitus  admitted  with low  hb    anemia from acute gi blood loss  stool is guaiac  positive  follow  serial   hb  hold  aspirin   s/p prbc  ine r   gi eval, dvt  ppx, dm, follow  fs,  htn on meds  wound care to see pt pt  with  htn, bed bound, non healing stage  4 sacral decubitus  admitted  with low  hb    anemia from acute gi blood loss  stool is guaiac  positive  follow  serial   hb  hold  aspirin   s/p prbc  ine r   gi eval, dvt  ppx, dm, follow  fs,  htn on meds  wound care to see pt  echo, with h/o  diastolic  dysfunction pt  with  htn, , non healing stage  4 sacral decubitus,  with c/c guerrero,   admitted  with low  hb    anemia from acute gi blood loss  denies  melena brbpr  stool is guaiac  positive  follow  serial   hb  hold  aspirin   s/p prbc  in  er   gi eval, dvt  ppx, dm, follow  fs,  htn on meds  wound care to see pt  echo, with h/o  diastolic  dysfunction

## 2017-12-16 NOTE — H&P ADULT - NSHPPHYSICALEXAM_GEN_ALL_CORE
PHYSICAL EXAMINATION:  Vital Signs Last 24 Hrs  T(C): 36.6 (16 Dec 2017 10:01), Max: 37.1 (16 Dec 2017 07:32)  T(F): 97.9 (16 Dec 2017 10:01), Max: 98.8 (16 Dec 2017 07:32)  HR: 69 (16 Dec 2017 10:01) (60 - 69)  BP: 173/53 (16 Dec 2017 10:01) (160/63 - 173/53)  BP(mean): --  RR: 16 (16 Dec 2017 10:01) (16 - 17)  SpO2: 100% (16 Dec 2017 07:32) (100% - 100%)  CAPILLARY BLOOD GLUCOSE      POCT Blood Glucose.: 131 mg/dL (16 Dec 2017 10:10)        GENERAL: NAD, well-groomed,  HEAD:  atraumatic, normocephalic  EYES: sclera anicteric  ENMT: mucous membranes moist  NECK: supple, No JVD  CHEST/LUNG: clear to auscultation bilaterally;    no      rales   , rhonchi,   HEART: normal S1, S2  ABDOMEN: BS+, soft, ND, NT   EXTREMITIES:  pulses palpable; no clubbing, cyanosis, or edema b/l LEs  NEURO: awake,   SKIN: no     rash  , stage 4  sacral decubiti PHYSICAL EXAMINATION:  Vital Signs Last 24 Hrs  T(C): 36.6 (16 Dec 2017 10:01), Max: 37.1 (16 Dec 2017 07:32)  T(F): 97.9 (16 Dec 2017 10:01), Max: 98.8 (16 Dec 2017 07:32)  HR: 69 (16 Dec 2017 10:01) (60 - 69)  BP: 173/53 (16 Dec 2017 10:01) (160/63 - 173/53)  BP(mean): --  RR: 16 (16 Dec 2017 10:01) (16 - 17)  SpO2: 100% (16 Dec 2017 07:32) (100% - 100%)  CAPILLARY BLOOD GLUCOSE      POCT Blood Glucose.: 131 mg/dL (16 Dec 2017 10:10)        GENERAL: NAD, well-groomed,  HEAD:  atraumatic, normocephalic  EYES: sclera anicteric  ENMT: mucous membranes moist  NECK: supple, No JVD  CHEST/LUNG: clear to auscultation bilaterally;    no      rales   , rhonchi,   HEART: normal S1, S2  ABDOMEN: BS+, soft, ND, NT   EXTREMITIES:  c/c  dermatitis, with  hyperpigmentation/ mild  edema b/l LEs  NEURO: awake,   SKIN: no     rash  , stage 4  sacral decubiti,  c/c  guerrero

## 2017-12-16 NOTE — H&P ADULT - NSHPREVIEWOFSYSTEMS_GEN_ALL_CORE
REVIEW OF SYSTEMS:    CONSTITUTIONAL:  weakness  no , fevers or chills  EYES/ENT: No visual changes;    NECK: No pain   RESPIRATORY: No cough, wheezing, hemoptysis; No shortness of breath  CARDIOVASCULAR: No chest pain or palpitations  GASTROINTESTINAL: No abdominal or epigastric pain. No nausea, vomiting, or hematemesis; No diarrhea or constipation.       No  melena   ,  no      brbpr  GENITOURINARY: No dysuria, frequency   NEUROLOGICAL: No  focal  weakness  SKIN   No  rash  PSYCH    Alert

## 2017-12-16 NOTE — H&P ADULT - NSHPLABSRESULTS_GEN_ALL_CORE
LABS:                        6.7    9.3   )-----------( 522      ( 16 Dec 2017 08:09 )             23.2     12-16    142  |  104  |  15  ----------------------------<  132<H>  4.0   |  28  |  0.45<L>    Ca    8.9      16 Dec 2017 08:09    TPro  6.3  /  Alb  2.5<L>  /  TBili  0.1<L>  /  DBili  x   /  AST  11  /  ALT  5<L>  /  AlkPhos  62  12-16    PT/INR - ( 16 Dec 2017 08:09 )   PT: 12.8 sec;   INR: 1.18 ratio         PTT - ( 16 Dec 2017 08:09 )  PTT:30.5 sec        RADIOLOGY & ADDITIONAL TESTS:

## 2017-12-16 NOTE — ED ADULT NURSE REASSESSMENT NOTE - NS ED NURSE REASSESS COMMENT FT1
MD Leonard notified of BP of systolic 173. MD prefers not to medicate due to low Hgb. Patient condition stable. Report given to ED holding. See Handoff details.

## 2017-12-16 NOTE — CHART NOTE - NSCHARTNOTEFT_GEN_A_CORE
repeat cbc after transfusion of 1 unit of PRBC is                        7.6    10.5  )-----------( 461      ( 16 Dec 2017 16:46 )             24.1     Goal for cardiology is to keep hgb greater than 8    Plan  transfuse a half of unit prbc    Will endorse to primary day team, attending to follow    Amanda Martinez NP  spectra link #[phone]

## 2017-12-16 NOTE — PROGRESS NOTE ADULT - SUBJECTIVE AND OBJECTIVE BOX
84  BF admitted w Hgb 6.4. Denies melena or brbpr. Reports neg colonoscopy at [facility] in last few years. No prior EGD. Reports poor appetite and 15 lb LOW, and lower abd discomfort. No heartburn or dysphagia. No change in bowel habit. On ASA. Moderate ETOH. No hx of liver disease.  No FH colon CA. Stool OB pos.    PMH DM and HTN    PSH: TAHBSO for CA ovary.   No chemo      T(F): 98.2 (12-16-17 @ 12:35), Max: 98.8 (12-16-17 @ 07:32)  HR: 80 (12-16-17 @ 12:35) (60 - 81)  BP: 172/76 (12-16-17 @ 12:35) (160/63 - 185/74)  RR: 17 (12-16-17 @ 12:35) (16 - 20)  SpO2: 98% (12-16-17 @ 12:35) (98% - 100%)  Wt(kg): --  ,   I&O's Summary    16 Dec 2017 07:01  -  16 Dec 2017 13:34  --------------------------------------------------------  IN: 0 mL / OUT: 500 mL / NET: -500 mL          Pex Anicteric  card neg  Lungs clear. \No edema.                          6.7    9.3   )-----------( 522      ( 16 Dec 2017 08:09 )             23.2               12-16    142  |  104  |  15  ----------------------------<  132<H>  4.0   |  28  |  0.45<L>    Ca    8.9      16 Dec 2017 08:09    TPro  6.3  /  Alb  2.5<L>  /  TBili  0.1<L>  /  DBili  x   /  AST  11  /  ALT  5<L>  /  AlkPhos  62  12-16    PT/INR - ( 16 Dec 2017 08:09 )   PT: 12.8 sec;   INR: 1.18 ratio         PTT - ( 16 Dec 2017 08:09 )  PTT:30.5 sec

## 2017-12-16 NOTE — ED PROVIDER NOTE - MEDICAL DECISION MAKING DETAILS
85 yo F w/ chronic anemia presenting with Hgb of 6.4, found to be guiac positive. Will obtain basics, type and screen, replete with blood, and admit for further management.

## 2017-12-16 NOTE — ED PROVIDER NOTE - OBJECTIVE STATEMENT
85 yo F w/ PMH of DM, HTN, lymphedema, iron deficiency anemia, urinary retention, and stage 4 sacral ulcer BIBEMS from [facility] due to low hemoglobin/hematocrit. Patient had recent blood draw at her rehab hospital, where a 6.4 Hgb was recorded. EMS was called to transport patient to [facility] ED for further management. Patient denies pain and has no complaints in the department. Denies HA, vision change, sore throat, neck pain, cp, sob, ab pain, n/v/d/c, urinary symptoms, hematochezia, melena. pt  has  MOLST  form,  in  chart,   DNR  confirmed  with  family

## 2017-12-16 NOTE — ED ADULT NURSE NOTE - CHPI ED SYMPTOMS NEG
no numbness/no pain/no chills/no nausea/no decreased eating/drinking/no dizziness/no vomiting/no fever/no tingling/no weakness

## 2017-12-16 NOTE — PROGRESS NOTE ADULT - ASSESSMENT
pt  with  htn, , non healing stage  4 sacral decubitus,  with c/c guerrero,   admitted  with low  hb    anemia from acute gi blood loss  denies  melena brbpr  stool is guaiac  positive  Suspect UGI source with poor appetite and LOW  P: follow  serial   hb  hold  aspirin   s/p prbc  in  er   Consider endoscopic w/u pending discussion with pt and family.  Thank you for calling

## 2017-12-16 NOTE — ED ADULT NURSE REASSESSMENT NOTE - NS ED NURSE REASSESS COMMENT FT1
Patient is experiencing acute episode of disorientation which is baseline according to her rehab medical records. Patient was previously alert and oriented and is now asking staff to "bring her back to her room where they were examining the blood" and does not recall transfer to ED from rehab earlier this morning. Patient condition is stable. Patient is being prepared for blood transfusion and has been admitted to the hospital. Will continue to monitor.

## 2017-12-16 NOTE — ED ADULT NURSE NOTE - OBJECTIVE STATEMENT
85 yo female presents to ED from rehab for low hemoglobin of 6.4. Patient is bedbound, A&O x3. S1 S2 audible. Lungs clear and equal daria. Patient denies SOB and chest pain at rest but reports SOB with activity and "excitement". Patient is oxygen dependent on 2L nasal cannula. Abdomen is soft, non tender, non distended with a firm area in the umbilical region. Patient denies N/V/D. Bedside hemoccult test revealed occult rectal blood. No ellis blood/ hemorrhoids, deformities noted at the rectum on physical exam. Patient denies noticing blood in stool. Skin is warm and dry. There is a stage 4 sacral ulcer with a clean dry dressing. Skin color is appropriate for race. <2 sec cap refill. Safety and comfort maintained. Will continue to monitor. 85 yo female presents to ED from rehab for low hemoglobin of 6.4. Patient is bedbound, A&O x3. S1 S2 audible. Lungs clear and equal daria. Patient denies SOB and chest pain at rest but reports SOB with activity and "excitement". Patient is oxygen dependent on 2L nasal cannula. Abdomen is soft, non tender, non distended with a firm area in the umbilical region. Patient denies N/V/D. Bedside hemoccult test revealed occult rectal blood. No ellis blood/ hemorrhoids, deformities noted at the rectum on physical exam. Patient denies noticing blood in stool. Skin is warm and dry. There is a stage 4 sacral ulcer with a clean dry dressing. Skin color is appropriate for race. <2 sec cap refill. Patient arrived with Simon in place that she states is for wound healing of sacral ulcer. Simon is draining clear yellow urine. Patient is incontinent of stool. Safety and comfort maintained. Will continue to monitor.

## 2017-12-16 NOTE — ED PROVIDER NOTE - ATTENDING CONTRIBUTION TO CARE
85 yo F w/ PMH of DM, HTN, lymphedema, iron deficiency anemia comes to ed complains of anemia on recent cbc, Sent by md for eval/transfusion. Pt denies abd pain,rectal bleeding. fever chills, shortness of breath chest pain. PE Elderly female awake alert normocephalic atraumatic chest clear abd soft +bs. neuro no focal defects  Gilberto Taylor MD, Facep

## 2017-12-16 NOTE — ED ADULT NURSE REASSESSMENT NOTE - NS ED NURSE REASSESS COMMENT FT1
Blood bank reports that type & screen will not be resulted for at least 1 more hour. Will continue to monitor patient condition. Patient is stable and resting in bed at this time.

## 2017-12-16 NOTE — H&P ADULT - HISTORY OF PRESENT ILLNESS
84y Female complaining of low Hemoglobin.	   83 yo F w/ PMH of DM, HTN, lymphedema, iron deficiency anemia, urinary retention, and stage 4 sacral ulcer BIBEMS from [facility] due to low hemoglobin/hematocrit. no h/o melena/ brbpr,  stool in  er is guaiac positive  Patient had recent blood draw at her rehab hospital, where a 6.4 Hgb was recorded.  EMS was called to transport patient to [facility] ED for further management. Patient denies pain and has no complaints in the department. Denies HA, vision change, sore throat, neck pain, cp, sob, ab pain, n/v/d/c, urinary symptoms, hematochezia, melena. pt  has  MOLST  form,  in  chart,   DNR  confirmed  with  family prbc  in er   elsa reyes

## 2017-12-17 LAB
ANION GAP SERPL CALC-SCNC: 12 MMOL/L — SIGNIFICANT CHANGE UP (ref 5–17)
BUN SERPL-MCNC: 13 MG/DL — SIGNIFICANT CHANGE UP (ref 7–23)
CALCIUM SERPL-MCNC: 9.3 MG/DL — SIGNIFICANT CHANGE UP (ref 8.4–10.5)
CHLORIDE SERPL-SCNC: 105 MMOL/L — SIGNIFICANT CHANGE UP (ref 96–108)
CO2 SERPL-SCNC: 24 MMOL/L — SIGNIFICANT CHANGE UP (ref 22–31)
CREAT SERPL-MCNC: 0.44 MG/DL — LOW (ref 0.5–1.3)
GLUCOSE BLDC GLUCOMTR-MCNC: 123 MG/DL — HIGH (ref 70–99)
GLUCOSE BLDC GLUCOMTR-MCNC: 149 MG/DL — HIGH (ref 70–99)
GLUCOSE BLDC GLUCOMTR-MCNC: 153 MG/DL — HIGH (ref 70–99)
GLUCOSE BLDC GLUCOMTR-MCNC: 199 MG/DL — HIGH (ref 70–99)
GLUCOSE SERPL-MCNC: 124 MG/DL — HIGH (ref 70–99)
HBA1C BLD-MCNC: 5.8 % — HIGH (ref 4–5.6)
HCT VFR BLD CALC: 24.9 % — LOW (ref 34.5–45)
HCT VFR BLD CALC: 25.3 % — LOW (ref 34.5–45)
HCT VFR BLD CALC: 29.2 % — LOW (ref 34.5–45)
HGB BLD-MCNC: 7.6 G/DL — LOW (ref 11.5–15.5)
HGB BLD-MCNC: 8 G/DL — LOW (ref 11.5–15.5)
HGB BLD-MCNC: 9.2 G/DL — LOW (ref 11.5–15.5)
MCHC RBC-ENTMCNC: 22 PG — LOW (ref 27–34)
MCHC RBC-ENTMCNC: 23.9 PG — LOW (ref 27–34)
MCHC RBC-ENTMCNC: 24.5 PG — LOW (ref 27–34)
MCHC RBC-ENTMCNC: 30.5 GM/DL — LOW (ref 32–36)
MCHC RBC-ENTMCNC: 31.6 GM/DL — LOW (ref 32–36)
MCHC RBC-ENTMCNC: 31.7 GM/DL — LOW (ref 32–36)
MCV RBC AUTO: 72 FL — LOW (ref 80–100)
MCV RBC AUTO: 75.5 FL — LOW (ref 80–100)
MCV RBC AUTO: 77.6 FL — LOW (ref 80–100)
PLATELET # BLD AUTO: 449 K/UL — HIGH (ref 150–400)
PLATELET # BLD AUTO: 482 K/UL — HIGH (ref 150–400)
PLATELET # BLD AUTO: 497 K/UL — HIGH (ref 150–400)
POTASSIUM SERPL-MCNC: 4.3 MMOL/L — SIGNIFICANT CHANGE UP (ref 3.5–5.3)
POTASSIUM SERPL-SCNC: 4.3 MMOL/L — SIGNIFICANT CHANGE UP (ref 3.5–5.3)
RBC # BLD: 3.36 M/UL — LOW (ref 3.8–5.2)
RBC # BLD: 3.46 M/UL — LOW (ref 3.8–5.2)
RBC # BLD: 3.76 M/UL — LOW (ref 3.8–5.2)
RBC # FLD: 17.8 % — HIGH (ref 10.3–14.5)
RBC # FLD: 18.2 % — HIGH (ref 10.3–14.5)
RBC # FLD: 18.3 % — HIGH (ref 10.3–14.5)
SODIUM SERPL-SCNC: 141 MMOL/L — SIGNIFICANT CHANGE UP (ref 135–145)
WBC # BLD: 10.23 K/UL — SIGNIFICANT CHANGE UP (ref 3.8–10.5)
WBC # BLD: 10.5 K/UL — SIGNIFICANT CHANGE UP (ref 3.8–10.5)
WBC # BLD: 11.9 K/UL — HIGH (ref 3.8–10.5)
WBC # FLD AUTO: 10.23 K/UL — SIGNIFICANT CHANGE UP (ref 3.8–10.5)
WBC # FLD AUTO: 10.5 K/UL — SIGNIFICANT CHANGE UP (ref 3.8–10.5)
WBC # FLD AUTO: 11.9 K/UL — HIGH (ref 3.8–10.5)

## 2017-12-17 PROCEDURE — 93010 ELECTROCARDIOGRAM REPORT: CPT

## 2017-12-17 RX ORDER — LISINOPRIL 2.5 MG/1
2.5 TABLET ORAL ONCE
Qty: 0 | Refills: 0 | Status: DISCONTINUED | OUTPATIENT
Start: 2017-12-17 | End: 2017-12-17

## 2017-12-17 RX ORDER — ACETAMINOPHEN 500 MG
1000 TABLET ORAL ONCE
Qty: 0 | Refills: 0 | Status: COMPLETED | OUTPATIENT
Start: 2017-12-17 | End: 2017-12-17

## 2017-12-17 RX ORDER — INSULIN LISPRO 100/ML
VIAL (ML) SUBCUTANEOUS AT BEDTIME
Qty: 0 | Refills: 0 | Status: DISCONTINUED | OUTPATIENT
Start: 2017-12-17 | End: 2017-12-22

## 2017-12-17 RX ORDER — LISINOPRIL 2.5 MG/1
5 TABLET ORAL DAILY
Qty: 0 | Refills: 0 | Status: DISCONTINUED | OUTPATIENT
Start: 2017-12-17 | End: 2017-12-17

## 2017-12-17 RX ADMIN — Medication 1: at 17:51

## 2017-12-17 RX ADMIN — Medication 400 MILLIGRAM(S): at 02:32

## 2017-12-17 RX ADMIN — Medication 5 MILLIGRAM(S): at 17:50

## 2017-12-17 RX ADMIN — Medication 1000 MILLIGRAM(S): at 03:05

## 2017-12-17 RX ADMIN — PANTOPRAZOLE SODIUM 40 MILLIGRAM(S): 20 TABLET, DELAYED RELEASE ORAL at 11:05

## 2017-12-17 RX ADMIN — CARVEDILOL PHOSPHATE 12.5 MILLIGRAM(S): 80 CAPSULE, EXTENDED RELEASE ORAL at 06:13

## 2017-12-17 RX ADMIN — LISINOPRIL 2.5 MILLIGRAM(S): 2.5 TABLET ORAL at 06:13

## 2017-12-17 RX ADMIN — Medication 1: at 13:42

## 2017-12-17 RX ADMIN — CARVEDILOL PHOSPHATE 12.5 MILLIGRAM(S): 80 CAPSULE, EXTENDED RELEASE ORAL at 17:50

## 2017-12-17 NOTE — PROGRESS NOTE ADULT - SUBJECTIVE AND OBJECTIVE BOX
no melena/brbpr   no abd pain  resting in bed    MEDICATIONS  (STANDING):  carvedilol 12.5 milliGRAM(s) Oral every 12 hours  dextrose 5%. 1000 milliLiter(s) (50 mL/Hr) IV Continuous <Continuous>  dextrose 50% Injectable 12.5 Gram(s) IV Push once  dextrose 50% Injectable 25 Gram(s) IV Push once  dextrose 50% Injectable 25 Gram(s) IV Push once  enalapril 5 milliGRAM(s) Oral two times a day  insulin lispro (HumaLOG) corrective regimen sliding scale   SubCutaneous three times a day before meals  pantoprazole  Injectable 40 milliGRAM(s) IV Push daily  sodium chloride 0.9%. 1000 milliLiter(s) (50 mL/Hr) IV Continuous <Continuous>    MEDICATIONS  (PRN):  dextrose Gel 1 Dose(s) Oral once PRN Blood Glucose LESS THAN 70 milliGRAM(s)/deciliter  glucagon  Injectable 1 milliGRAM(s) IntraMuscular once PRN Glucose LESS THAN 70 milligrams/deciliter      Vital Signs Last 24 Hrs  T(C): 37 (17 Dec 2017 06:14), Max: 37 (16 Dec 2017 20:11)  T(F): 98.6 (17 Dec 2017 06:14), Max: 98.6 (16 Dec 2017 20:11)  HR: 80 (17 Dec 2017 06:14) (66 - 81)  BP: 163/67 (17 Dec 2017 06:14) (150/60 - 185/74)  BP(mean): --  RR: 18 (17 Dec 2017 06:14) (16 - 20)  SpO2: 98% (17 Dec 2017 06:14) (98% - 100%)  CAPILLARY BLOOD GLUCOSE      POCT Blood Glucose.: 123 mg/dL (17 Dec 2017 08:59)  POCT Blood Glucose.: 150 mg/dL (16 Dec 2017 18:42)  POCT Blood Glucose.: 141 mg/dL (16 Dec 2017 12:26)  POCT Blood Glucose.: 131 mg/dL (16 Dec 2017 10:10)    I&O's Summary    16 Dec 2017 07:01  -  17 Dec 2017 07:00  --------------------------------------------------------  IN: 1140 mL / OUT: 1100 mL / NET: 40 mL        PHYSICAL EXAM:  HEAD:  Atraumatic, Normocephalic  NECK: Supple, No JVD  CHEST/LUNG: Clear to auscultation bilaterally; No wheeze  HEART: Regular rate and rhythm;           murmur  ABDOMEN: Soft, Nontender, ;   EXTREMITIES:              edema  NEUROLOGY:  alert    LABS:                        8.0    10.5  )-----------( 482      ( 17 Dec 2017 07:19 )             25.3     12-16    142  |  104  |  15  ----------------------------<  132<H>  4.0   |  28  |  0.45<L>    Ca    8.9      16 Dec 2017 08:09    TPro  6.3  /  Alb  2.5<L>  /  TBili  0.1<L>  /  DBili  x   /  AST  11  /  ALT  5<L>  /  AlkPhos  62  12-16    PT/INR - ( 16 Dec 2017 08:09 )   PT: 12.8 sec;   INR: 1.18 ratio         PTT - ( 16 Dec 2017 08:09 )  PTT:30.5 sec            12-16 @ 08:09  3.9  28          Consultant(s) Notes Reviewed:      Care Discussed with Consultants/Other Providers:

## 2017-12-17 NOTE — PROGRESS NOTE ADULT - SUBJECTIVE AND OBJECTIVE BOX
- Patient seen and examined.  - In summary, patient is a 84y year old woman who presented with anemia (16 Dec 2017 10:10)  - Today, patient is without complaints.         *****MEDICATIONS:    MEDICATIONS  (STANDING):  carvedilol 12.5 milliGRAM(s) Oral every 12 hours  dextrose 5%. 1000 milliLiter(s) (50 mL/Hr) IV Continuous <Continuous>  dextrose 50% Injectable 12.5 Gram(s) IV Push once  dextrose 50% Injectable 25 Gram(s) IV Push once  dextrose 50% Injectable 25 Gram(s) IV Push once  insulin lispro (HumaLOG) corrective regimen sliding scale   SubCutaneous three times a day before meals  lisinopril 2.5 milliGRAM(s) Oral daily  pantoprazole  Injectable 40 milliGRAM(s) IV Push daily  sodium chloride 0.9%. 1000 milliLiter(s) (50 mL/Hr) IV Continuous <Continuous>    MEDICATIONS  (PRN):  dextrose Gel 1 Dose(s) Oral once PRN Blood Glucose LESS THAN 70 milliGRAM(s)/deciliter  glucagon  Injectable 1 milliGRAM(s) IntraMuscular once PRN Glucose LESS THAN 70 milligrams/deciliter           ***** REVIEW OF SYSTEM:  GEN: no fever, no chills, no pain  RESP: no SOB, no cough, no sputum  CVS: no chest pain, no palpitations, no edema  GI: no abdominal pain, no nausea, no vomiting, no constipation, no diarrhea  : no dysurea, no frequency  NEURO: no headache, no diziness  PSYCH: no depression, not anxious  Derm : no itching, no rash         ***** VITAL SIGNS:  T(F): 98.6 (17 @ 06:14), Max: 98.6 (17 @ 20:11)  HR: 80 (17 @ 06:14) (66 - 81)  BP: 163/67 (17 @ 06:14) (150/60 - 185/74)  RR: 18 (17 @ 06:14) (16 - 20)  SpO2: 98% (17 @ 06:14) (98% - 100%)  Wt(kg): --  ,   I&O's Summary    16 Dec 2017 07:01  -  17 Dec 2017 07:00  --------------------------------------------------------  IN: 1140 mL / OUT: 1100 mL / NET: 40 mL             *****PHYSICAL EXAM:  GEN: A&O X 3 , NAD , comfortable  HEENT: NCAT, EOMI, MMM, no icterus  NECK: Supple, No JVD  CVS: S1S2 , regular , No M/R/G appreciated  PULM: CTA B/L,  no W/R/R appreciated  ABD.: soft. non tender, non distended,  bowel sounds present  Extrem: intact pulses , no edema noted  Derm: No rash or ecchymosis noted  PSYCH: normal mood, no depression, not anxious         *****LAB AND IMAGIN.0    10.5  )-----------( 482      ( 17 Dec 2017 07:19 )             25.3               12-16    142  |  104  |  15  ----------------------------<  132<H>  4.0   |  28  |  0.45<L>    Ca    8.9      16 Dec 2017 08:09    TPro  6.3  /  Alb  2.5<L>  /  TBili  0.1<L>  /  DBili  x   /  AST  11  /  ALT  5<L>  /  AlkPhos  62  12-16    PT/INR - ( 16 Dec 2017 08:09 )   PT: 12.8 sec;   INR: 1.18 ratio         PTT - ( 16 Dec 2017 08:09 )  PTT:30.5 sec                     [All pertinent recent Imaging/Reports reviewed]  < from: Transthoracic Echocardiogram (17 @ 17:46) >  Conclusions:  1. Mitral annular calcification, otherwise normal mitral  valve. Mild mitral regurgitation.  2. Peak transaortic valve gradient equals 18 mm Hg, mean  transaortic valve gradient equals 8 mm Hg, estimated aortic  valve area equals 1.9 sqcm (by continuity equation), aortic  valve velocity time integral equals 42 cm, consistent with  mild aortic stenosis.  3. Mild to moderate left atrial enlargement.  4. Normal left ventricular internal dimensions and wall  thicknesses.  5. Normal left ventricular systolic function. No segmental  wall motion abnormalities.  6. Reversal of the E-A  waves of the mitral inflow pattern  is consistent with diastolic LV dysfunction.  7. Normal right ventricular size and function.    < end of copied text >         *****A S S E S S M E N T   A N D   P L A N :  84F  with  htn, , non healing stage  4 sacral decubitus,  adm with severe anemia  anemia likely from GI blood loss  denies  melena brbpr  stool reportedly guaiac  positive  follow cbc closely  hold  aspirin  maintain Hgb =8  f/u GI, considering endoscopy  Pt was at rehab but essentially non-ambulatory (<4 mets activity)  monitor I/O with prbc  echo 2017 as above  acceptable cardiac risk for endoscopy if warranted  BP elevated, will restart coreg 6.25bid      __________________________  A. KHLOE Rizo. - Patient seen and examined.  - In summary, patient is a 84y year old woman who presented with anemia (16 Dec 2017 10:10)  - Today, patient is without complaints.         *****MEDICATIONS:    MEDICATIONS  (STANDING):  carvedilol 12.5 milliGRAM(s) Oral every 12 hours  dextrose 5%. 1000 milliLiter(s) (50 mL/Hr) IV Continuous <Continuous>  dextrose 50% Injectable 12.5 Gram(s) IV Push once  dextrose 50% Injectable 25 Gram(s) IV Push once  dextrose 50% Injectable 25 Gram(s) IV Push once  insulin lispro (HumaLOG) corrective regimen sliding scale   SubCutaneous three times a day before meals  lisinopril 2.5 milliGRAM(s) Oral daily  pantoprazole  Injectable 40 milliGRAM(s) IV Push daily  sodium chloride 0.9%. 1000 milliLiter(s) (50 mL/Hr) IV Continuous <Continuous>    MEDICATIONS  (PRN):  dextrose Gel 1 Dose(s) Oral once PRN Blood Glucose LESS THAN 70 milliGRAM(s)/deciliter  glucagon  Injectable 1 milliGRAM(s) IntraMuscular once PRN Glucose LESS THAN 70 milligrams/deciliter           ***** REVIEW OF SYSTEM:  GEN: no fever, no chills, no pain  RESP: no SOB, no cough, no sputum  CVS: no chest pain, no palpitations, no edema  GI: no abdominal pain, no nausea, no vomiting, no constipation, no diarrhea  : no dysurea, no frequency  NEURO: no headache, no diziness  PSYCH: no depression, not anxious  Derm : no itching, no rash         ***** VITAL SIGNS:  T(F): 98.6 (17 @ 06:14), Max: 98.6 (17 @ 20:11)  HR: 80 (17 @ 06:14) (66 - 81)  BP: 163/67 (17 @ 06:14) (150/60 - 185/74)  RR: 18 (17 @ 06:14) (16 - 20)  SpO2: 98% (17 @ 06:14) (98% - 100%)  Wt(kg): --  ,   I&O's Summary    16 Dec 2017 07:01  -  17 Dec 2017 07:00  --------------------------------------------------------  IN: 1140 mL / OUT: 1100 mL / NET: 40 mL             *****PHYSICAL EXAM:  GEN: A&O X 3 , NAD , comfortable  HEENT: NCAT, EOMI, MMM, no icterus  NECK: Supple, No JVD  CVS: S1S2 , regular , No M/R/G appreciated  PULM: CTA B/L,  no W/R/R appreciated  ABD.: soft. non tender, non distended,  bowel sounds present  Extrem: intact pulses , no edema noted  Derm: No rash or ecchymosis noted  PSYCH: normal mood, no depression, not anxious         *****LAB AND IMAGIN.0    10.5  )-----------( 482      ( 17 Dec 2017 07:19 )             25.3               12-16    142  |  104  |  15  ----------------------------<  132<H>  4.0   |  28  |  0.45<L>    Ca    8.9      16 Dec 2017 08:09    TPro  6.3  /  Alb  2.5<L>  /  TBili  0.1<L>  /  DBili  x   /  AST  11  /  ALT  5<L>  /  AlkPhos  62  12-16    PT/INR - ( 16 Dec 2017 08:09 )   PT: 12.8 sec;   INR: 1.18 ratio         PTT - ( 16 Dec 2017 08:09 )  PTT:30.5 sec                     [All pertinent recent Imaging/Reports reviewed]  < from: Transthoracic Echocardiogram (17 @ 17:46) >  Conclusions:  1. Mitral annular calcification, otherwise normal mitral  valve. Mild mitral regurgitation.  2. Peak transaortic valve gradient equals 18 mm Hg, mean  transaortic valve gradient equals 8 mm Hg, estimated aortic  valve area equals 1.9 sqcm (by continuity equation), aortic  valve velocity time integral equals 42 cm, consistent with  mild aortic stenosis.  3. Mild to moderate left atrial enlargement.  4. Normal left ventricular internal dimensions and wall  thicknesses.  5. Normal left ventricular systolic function. No segmental  wall motion abnormalities.  6. Reversal of the E-A  waves of the mitral inflow pattern  is consistent with diastolic LV dysfunction.  7. Normal right ventricular size and function.    < end of copied text >         *****A S S E S S M E N T   A N D   P L A N :  84F  with  htn, , non healing stage  4 sacral decubitus,  adm with severe anemia  anemia likely from GI blood loss  denies  melena brbpr  stool reportedly guaiac  positive  follow cbc closely  hold  aspirin  maintain Hgb =8  f/u GI, considering endoscopy  Pt was at rehab but essentially non-ambulatory (<4 mets activity)  monitor I/O with prbc  echo 2017 as above  acceptable cardiac risk for endoscopy if warranted  BP more elevated, will adjust meds	      __________________________  A. KHLOE Rizo.

## 2017-12-17 NOTE — PROGRESS NOTE ADULT - SUBJECTIVE AND OBJECTIVE BOX
Patient is a 84y old  Female who presents with a chief complaint of anemia (16 Dec 2017 10:10)      INTERVAL HPI/OVERNIGHT EVENTS:  No melena.  No rectal bleeding    MEDICATIONS  (STANDING):  carvedilol 12.5 milliGRAM(s) Oral every 12 hours  dextrose 5%. 1000 milliLiter(s) (50 mL/Hr) IV Continuous <Continuous>  dextrose 50% Injectable 12.5 Gram(s) IV Push once  dextrose 50% Injectable 25 Gram(s) IV Push once  dextrose 50% Injectable 25 Gram(s) IV Push once  enalapril 5 milliGRAM(s) Oral two times a day  insulin lispro (HumaLOG) corrective regimen sliding scale   SubCutaneous three times a day before meals  pantoprazole  Injectable 40 milliGRAM(s) IV Push daily  sodium chloride 0.9%. 1000 milliLiter(s) (50 mL/Hr) IV Continuous <Continuous>    MEDICATIONS  (PRN):  dextrose Gel 1 Dose(s) Oral once PRN Blood Glucose LESS THAN 70 milliGRAM(s)/deciliter  glucagon  Injectable 1 milliGRAM(s) IntraMuscular once PRN Glucose LESS THAN 70 milligrams/deciliter      Allergies    Sinemet (Unknown)      Review of Systems:    General:  No wt loss, fevers, chills, night sweats,fatigue,   ENT:  No sore throat, pain, runny nose, dysphagia  CV:  No pain, palpitatioins, hypo/hypertension  Resp:  No dyspnea, cough, tachypnea, wheezing  Neuro:  No weakness, tingling, memory problems  Heme:  No petechiae, ecchymosis, easy bruisability  Otherwise 10 point ROS negative    Vital Signs Last 24 Hrs  T(F): 98.1 (12-17-17 @ 09:41), Max: 98.6 (12-16-17 @ 20:11)  HR: 73 (12-17-17 @ 09:41) (66 - 80)  BP: 162/71 (12-17-17 @ 09:41) (150/60 - 184/71)  RR: 18 (12-17-17 @ 09:41) (17 - 18)  SpO2: 100% (12-17-17 @ 09:41) (98% - 100%)  Wt(kg): --  ,   I&O's Summary    16 Dec 2017 07:01  -  17 Dec 2017 07:00  --------------------------------------------------------  IN: 1140 mL / OUT: 1100 mL / NET: 40 mL    17 Dec 2017 07:01  -  17 Dec 2017 12:28  --------------------------------------------------------  IN: 250 mL / OUT: 0 mL / NET: 250 mL          PHYSICAL EXAM:    Constitutional: NAD, well-developed  Neck: No LAD, supple  Respiratory: clear to auscultation b/l no rales, rhonchi, wheezing  Cardiovascular: S1 and S2, RRR, no murmur  Gastrointestinal: +BS x4, soft, NT/ND, neg HSM,  Extremities: No peripheral edema, neg clubbing, cyanosis  Vascular: 2+ peripheral pulses  Neurological: A/O x 3, no focal deficits  Psychiatric: Normal mood, normal affect  Skin: No rashes    LABS:                        7.6    10.23 )-----------( 497      ( 17 Dec 2017 08:17 )             24.9     12-17    141  |  105  |  13  ----------------------------<  124<H>  4.3   |  24  |  0.44<L>    Ca    9.3      17 Dec 2017 08:54    TPro  6.3  /  Alb  2.5<L>  /  TBili  0.1<L>  /  DBili  x   /  AST  11  /  ALT  5<L>  /  AlkPhos  62  12-16    PT/INR - ( 16 Dec 2017 08:09 )   PT: 12.8 sec;   INR: 1.18 ratio         PTT - ( 16 Dec 2017 08:09 )  PTT:30.5 sec    LIVER FUNCTIONS - ( 16 Dec 2017 08:09 )  Alb: 2.5 g/dL / Pro: 6.3 g/dL / ALK PHOS: 62 U/L / ALT: 5 U/L RC / AST: 11 U/L / GGT: x             RADIOLOGY & ADDITIONAL TESTS:

## 2017-12-17 NOTE — PROGRESS NOTE ADULT - ASSESSMENT
83 yo F w/ PMH of DM, HTN, lymphedema, iron deficiency anemia, urinary retention, and stage 4 sacral ulcer, admitted from [facility] for severe anemia.  Found to have guaiac positive stool, but no overt or active GI bleeding.  Of note, patient is DNR.    Plan:  1.  Cardiology is following.  2.  Transfuse prn.  3.  Unable to reach family to discuss EGD.  I called all numbers listed in the chart, no answer.  4.  OK for full liquid diet.  5.  Will tentatively plan for EGD Monday or Tuesday, but need to discuss with the family and obtain informed consent.  A conservative approach is also reasonable, but need to discuss that with the family. 83 yo F w/ PMH of DM, HTN, lymphedema, iron deficiency anemia, urinary retention, and stage 4 sacral ulcer, admitted from [facility] for severe anemia.  Found to have guaiac positive stool, but no overt or active GI bleeding.  Of note, patient is DNR.  Anemia is likely multifactorial.    Plan:  1.  Cardiology is following.  2.  Transfuse prn.  I spoke to the NP that I recommend 2 units PRBCs for hgb 7.6  3.  I spoke to son, Mr. Bo Luna [phone].  He is refusing EGD based on his mother's know wishes not to pursue an invasive work-up.  4.  OK for consistent carbohydrate diet.  5.  No plans for EGD or colonoscopy per patient and family wishes.

## 2017-12-17 NOTE — CHART NOTE - NSCHARTNOTEFT_GEN_A_CORE
Notified that patient is agitated, yelling out loud. Complains of knee pain Denies chest pain, shortness of breaths, headache, Notified that patient is agitated, yelling out loud. Complains of knee pain Denies chest pain, shortness of breaths, headache,    EKG done  for possible administration of Haldol not given at this time.     Patient responded to reorientation.    Medicated with Tylenol for complaints of right knee pain.

## 2017-12-17 NOTE — PROGRESS NOTE ADULT - ASSESSMENT
pt  with  htn, , non healing stage  4 sacral decubitus,  with c/c guerrero,   admitted  with low  hb    anemia from acute gi blood loss  denies  melena/ brbpr  stool is guaiac  positive  follow  serial   hb  hold  aspirin   s/p prbc  in  er   gi eval, dvt  ppx, dm, follow  fs,  htn on meds  wound care to see pt  echo, with h/o  diastolic  dysfunction  bp meds adjusted today   awaiting gi  w/p, follow hb this am, pending

## 2017-12-18 LAB
ANION GAP SERPL CALC-SCNC: 13 MMOL/L — SIGNIFICANT CHANGE UP (ref 5–17)
BUN SERPL-MCNC: 9 MG/DL — SIGNIFICANT CHANGE UP (ref 7–23)
CALCIUM SERPL-MCNC: 8.7 MG/DL — SIGNIFICANT CHANGE UP (ref 8.4–10.5)
CHLORIDE SERPL-SCNC: 105 MMOL/L — SIGNIFICANT CHANGE UP (ref 96–108)
CO2 SERPL-SCNC: 23 MMOL/L — SIGNIFICANT CHANGE UP (ref 22–31)
CREAT SERPL-MCNC: 0.38 MG/DL — LOW (ref 0.5–1.3)
GLUCOSE BLDC GLUCOMTR-MCNC: 116 MG/DL — HIGH (ref 70–99)
GLUCOSE BLDC GLUCOMTR-MCNC: 132 MG/DL — HIGH (ref 70–99)
GLUCOSE BLDC GLUCOMTR-MCNC: 138 MG/DL — HIGH (ref 70–99)
GLUCOSE BLDC GLUCOMTR-MCNC: 163 MG/DL — HIGH (ref 70–99)
GLUCOSE SERPL-MCNC: 104 MG/DL — HIGH (ref 70–99)
HCT VFR BLD CALC: 25.1 % — LOW (ref 34.5–45)
HCT VFR BLD CALC: 30.8 % — LOW (ref 34.5–45)
HGB BLD-MCNC: 7.6 G/DL — LOW (ref 11.5–15.5)
HGB BLD-MCNC: 9.9 G/DL — LOW (ref 11.5–15.5)
MCHC RBC-ENTMCNC: 22.4 PG — LOW (ref 27–34)
MCHC RBC-ENTMCNC: 25.7 PG — LOW (ref 27–34)
MCHC RBC-ENTMCNC: 30.3 GM/DL — LOW (ref 32–36)
MCHC RBC-ENTMCNC: 32.3 GM/DL — SIGNIFICANT CHANGE UP (ref 32–36)
MCV RBC AUTO: 74 FL — LOW (ref 80–100)
MCV RBC AUTO: 79.7 FL — LOW (ref 80–100)
PLATELET # BLD AUTO: 388 K/UL — SIGNIFICANT CHANGE UP (ref 150–400)
PLATELET # BLD AUTO: 464 K/UL — HIGH (ref 150–400)
POTASSIUM SERPL-MCNC: 4 MMOL/L — SIGNIFICANT CHANGE UP (ref 3.5–5.3)
POTASSIUM SERPL-SCNC: 4 MMOL/L — SIGNIFICANT CHANGE UP (ref 3.5–5.3)
RBC # BLD: 3.39 M/UL — LOW (ref 3.8–5.2)
RBC # BLD: 3.86 M/UL — SIGNIFICANT CHANGE UP (ref 3.8–5.2)
RBC # FLD: 18.2 % — HIGH (ref 10.3–14.5)
RBC # FLD: 18.2 % — HIGH (ref 10.3–14.5)
SODIUM SERPL-SCNC: 141 MMOL/L — SIGNIFICANT CHANGE UP (ref 135–145)
WBC # BLD: 11.2 K/UL — HIGH (ref 3.8–10.5)
WBC # BLD: 9.57 K/UL — SIGNIFICANT CHANGE UP (ref 3.8–10.5)
WBC # FLD AUTO: 11.2 K/UL — HIGH (ref 3.8–10.5)
WBC # FLD AUTO: 9.57 K/UL — SIGNIFICANT CHANGE UP (ref 3.8–10.5)

## 2017-12-18 PROCEDURE — 99232 SBSQ HOSP IP/OBS MODERATE 35: CPT

## 2017-12-18 RX ADMIN — Medication 5 MILLIGRAM(S): at 05:07

## 2017-12-18 RX ADMIN — Medication 1: at 12:30

## 2017-12-18 RX ADMIN — CARVEDILOL PHOSPHATE 12.5 MILLIGRAM(S): 80 CAPSULE, EXTENDED RELEASE ORAL at 05:07

## 2017-12-18 RX ADMIN — SODIUM CHLORIDE 50 MILLILITER(S): 9 INJECTION INTRAMUSCULAR; INTRAVENOUS; SUBCUTANEOUS at 01:08

## 2017-12-18 RX ADMIN — PANTOPRAZOLE SODIUM 40 MILLIGRAM(S): 20 TABLET, DELAYED RELEASE ORAL at 11:29

## 2017-12-18 RX ADMIN — Medication 5 MILLIGRAM(S): at 18:23

## 2017-12-18 RX ADMIN — CARVEDILOL PHOSPHATE 12.5 MILLIGRAM(S): 80 CAPSULE, EXTENDED RELEASE ORAL at 18:23

## 2017-12-18 NOTE — PHYSICAL THERAPY INITIAL EVALUATION ADULT - PERTINENT HX OF CURRENT PROBLEM, REHAB EVAL
85 yo F w/ PMH of DM, HTN, lymphedema, iron deficiency anemia, urinary retention, and stage 4 sacral ulcer BIBEMS from [facility] due to low hemoglobin/hematocrit. 6.7/28.2 on admit + guiag stool ; H/H now 9.2/29.2 ; EKG NSR/LAD/RBBB

## 2017-12-18 NOTE — PHYSICAL THERAPY INITIAL EVALUATION ADULT - PLANNED THERAPY INTERVENTIONS, PT EVAL
transfer training/strengthening/balance training/bed mobility training balance training/Negative Pressure Wound Therapy/bed mobility training/strengthening/transfer training

## 2017-12-18 NOTE — PROGRESS NOTE ADULT - SUBJECTIVE AND OBJECTIVE BOX
Patient is a 84y old  Female who presents with a chief complaint of anemia (16 Dec 2017 10:10)      INTERVAL HPI/OVERNIGHT EVENTS:  no rectal bleed or melena reported  3 BM in past 24 hours - no diarrhea, soft brown stool per documentation    MEDICATIONS  (STANDING):  carvedilol 12.5 milliGRAM(s) Oral every 12 hours  dextrose 5%. 1000 milliLiter(s) (50 mL/Hr) IV Continuous <Continuous>  dextrose 50% Injectable 12.5 Gram(s) IV Push once  dextrose 50% Injectable 25 Gram(s) IV Push once  dextrose 50% Injectable 25 Gram(s) IV Push once  enalapril 5 milliGRAM(s) Oral two times a day  insulin lispro (HumaLOG) corrective regimen sliding scale   SubCutaneous three times a day before meals  insulin lispro (HumaLOG) corrective regimen sliding scale   SubCutaneous at bedtime  pantoprazole  Injectable 40 milliGRAM(s) IV Push daily  sodium chloride 0.9%. 1000 milliLiter(s) (50 mL/Hr) IV Continuous <Continuous>    MEDICATIONS  (PRN):  dextrose Gel 1 Dose(s) Oral once PRN Blood Glucose LESS THAN 70 milliGRAM(s)/deciliter  glucagon  Injectable 1 milliGRAM(s) IntraMuscular once PRN Glucose LESS THAN 70 milligrams/deciliter      Allergies  Sinemet (Unknown)      Review of Systems:  General:  No wt loss, fevers, chills, night sweats, fatigue,   ENT:  No sore throat, pain, runny nose, dysphagia  CV:  No pain, palpitatioins, hypo/hypertension  Resp:  No dyspnea, cough, tachypnea, wheezing  Neuro:  No weakness, tingling, memory problems  Heme:  No petechiae, ecchymosis, easy bruisability  Otherwise 10 point ROS negative    Vital Signs Last 24 Hrs  T(C): 37.2 (18 Dec 2017 05:01), Max: 37.2 (17 Dec 2017 16:29)  T(F): 99 (18 Dec 2017 05:01), Max: 99 (18 Dec 2017 05:01)  HR: 70 (18 Dec 2017 05:01) (69 - 97)  BP: 157/64 (18 Dec 2017 05:01) (134/63 - 159/73)  BP(mean): --  RR: 18 (18 Dec 2017 05:01) (18 - 18)  SpO2: 100% (18 Dec 2017 05:01) (97% - 100%)    PHYSICAL EXAM:  Constitutional: NAD, well-developed elderly female, resting comfortably  Neck: No LAD, supple  Respiratory: clear to auscultation b/l no rales, rhonchi, wheezing  Cardiovascular: S1 and S2, RRR, no murmur  Gastrointestinal: +BS x4, soft, NT/ND, neg HSM +guerrero  Extremities: No peripheral edema, neg clubbing, cyanosis  Vascular: 2+ peripheral pulses  Skin: +sacral decubitus    LABS:                        7.6    9.57  )-----------( 464      ( 18 Dec 2017 08:39 )             25.1         141  |  105  |  13  ----------------------------<  124<H>  4.3   |  24  |  0.44<L>    Ca    9.3      17 Dec 2017 08:54      LIVER FUNCTIONS - ( 16 Dec 2017 08:09 )  Alb: 2.5 g/dL / Pro: 6.3 g/dL / ALK PHOS: 62 U/L / ALT: 5 U/L RC / AST: 11 U/L / GGT: x             RADIOLOGY & ADDITIONAL TESTS:

## 2017-12-18 NOTE — PHYSICAL THERAPY INITIAL EVALUATION ADULT - ADDITIONAL COMMENTS
pt has been at [facility] x 4 months ; prior pt lives in [facility] in [place] alone in [facility] + elevator , pt dtr Marly [phone] and soon Ty pt has been at [facility] x few months  ; prior pt lives in [facility] in [place] alone in senior [facility] + elevator and had a 5 hr HHA every day except Sat and SUN per pt who assist with bath and dressing and personal needs ; pt was amb with rolling walker at that time , pt dtr Marly [phone] and son Ty; pt decline naming caregiver

## 2017-12-18 NOTE — CONSULT NOTE ADULT - ATTENDING COMMENTS
Bedbound 83 yo Diabetic female with a stage 4 sacral decubitus POA, not currently in need of debridement.  Wound dressed, orders given.

## 2017-12-18 NOTE — PHYSICAL THERAPY INITIAL EVALUATION ADULT - IMPAIRED TRANSFERS: SIT/STAND, REHAB EVAL
decrease endurance , mod of 2 PT block knees slight buckle after stand few min x 2/impaired balance/impaired postural control/decreased flexibility/decreased sensation/impaired sensory feedback/decreased strength

## 2017-12-18 NOTE — PROGRESS NOTE ADULT - ASSESSMENT
pt  with  htn, , non healing stage  4 sacral decubitus,  with c/c guerrero,   admitted  with low  hb    anemia from acute gi blood loss  denies  melena/ brbpr  stool is guaiac  positive  follow  serial   hb  hold  aspirin   s/p prbc  in  er   gi eval, dvt  ppx, dm, follow  fs,  htn on meds  wound care to see pt  echo, with h/o  diastolic  dysfunction  bp meds adjusted   family refusing gi  w/p, ok with prbc   to receive 2  units  today gi  w/p, follow hb this am, pending

## 2017-12-18 NOTE — PHYSICAL THERAPY INITIAL EVALUATION ADULT - GENERAL OBSERVATIONS, REHAB EVAL
pt received in bed nad + blood transfusion L ue ; pt agreeable for bed eval ; PT observe pt feed self having difficulty placing food on spoon or fork , PT assist with feed pt ; + compression Garments on le's pump on mod edema le's

## 2017-12-18 NOTE — PHYSICAL THERAPY INITIAL EVALUATION ADULT - LEVEL OF INDEPENDENCE, REHAB EVAL
moderate assist (50% patients effort)/maximum assist (25% patients effort) moderate assist (50% patients effort)/12/19/17 x6 R/ L rn anil clean x 2 + Bm and + Urine leaking from guerrero/maximum assist (25% patients effort)

## 2017-12-18 NOTE — PROGRESS NOTE ADULT - ASSESSMENT
83 yo F w/ PMH of DM, HTN, lymphedema, iron deficiency anemia, urinary retention, and stage 4 sacral ulcer, admitted from [facility] for severe anemia.  Found to have guaiac positive stool, but no overt or active GI bleeding.  Of note, patient is DNR.  Anemia is likely multifactorial.    Plan:  - Cardiology is following.  - Transfuse prn.    - Family is refusing EGD based on patient's know wishes not to pursue an invasive work-up.  - OK to continue consistent carbohydrate diet.  - No plans for EGD or colonoscopy per patient and family wishes.  - Wound care    Discussed with Medicine team and attending  Hao Song PA-C    [facility]  [phone]

## 2017-12-18 NOTE — PROGRESS NOTE ADULT - SUBJECTIVE AND OBJECTIVE BOX
- Patient seen and examined.  - In summary, patient is a 84y year old woman who presented with anemia (16 Dec 2017 10:10)  - Today, patient is without complaints.         *****MEDICATIONS:    MEDICATIONS  (STANDING):  carvedilol 12.5 milliGRAM(s) Oral every 12 hours  dextrose 5%. 1000 milliLiter(s) (50 mL/Hr) IV Continuous <Continuous>  dextrose 50% Injectable 12.5 Gram(s) IV Push once  dextrose 50% Injectable 25 Gram(s) IV Push once  dextrose 50% Injectable 25 Gram(s) IV Push once  enalapril 5 milliGRAM(s) Oral two times a day  insulin lispro (HumaLOG) corrective regimen sliding scale   SubCutaneous three times a day before meals  insulin lispro (HumaLOG) corrective regimen sliding scale   SubCutaneous at bedtime  pantoprazole  Injectable 40 milliGRAM(s) IV Push daily  sodium chloride 0.9%. 1000 milliLiter(s) (50 mL/Hr) IV Continuous <Continuous>    MEDICATIONS  (PRN):  dextrose Gel 1 Dose(s) Oral once PRN Blood Glucose LESS THAN 70 milliGRAM(s)/deciliter  glucagon  Injectable 1 milliGRAM(s) IntraMuscular once PRN Glucose LESS THAN 70 milligrams/deciliter             ***** REVIEW OF SYSTEM:  GEN: no fever, no chills, no pain  RESP: no SOB, no cough, no sputum  CVS: no chest pain, no palpitations, no edema  GI: no abdominal pain, no nausea, no vomiting, no constipation, no diarrhea  : no dysurea, no frequency  NEURO: no headache, no diziness  PSYCH: no depression, not anxious  Derm : no itching, no rash         ***** VITAL SIGNS:    T(F): 99 (17 @ 05:01), Max: 99 (17 @ 05:01)  HR: 70 (17 @ 05:01) (69 - 97)  BP: 157/64 (17 @ 05:01) (134/63 - 159/73)  RR: 18 (17 @ 05:01) (18 - 18)  SpO2: 100% (17 @ 05:01) (97% - 100%)  Wt(kg): --  ,   I&O's Summary    17 Dec 2017 07:01  -  18 Dec 2017 07:00  --------------------------------------------------------  IN: 2300 mL / OUT: 500 mL / NET: 1800 mL                   *****PHYSICAL EXAM:  GEN: A&O X 3 , NAD , comfortable  HEENT: NCAT, EOMI, MMM, no icterus  NECK: Supple, No JVD  CVS: S1S2 , regular , No M/R/G appreciated  PULM: CTA B/L,  no W/R/R appreciated  ABD.: soft. non tender, non distended,  bowel sounds present  Extrem: intact pulses , no edema noted  Derm: No rash or ecchymosis noted  PSYCH: normal mood, no depression, not anxious         *****LAB AND IMAGIN.6    9.57  )-----------( 464      ( 18 Dec 2017 08:39 )             25.1               -    141  |  105  |  13  ----------------------------<  124<H>  4.3   |  24  |  0.44<L>    Ca    9.3      17 Dec 2017 08:54        [All pertinent recent Imaging/Reports reviewed]  < from: Transthoracic Echocardiogram (17 @ 17:46) >  Conclusions:  1. Mitral annular calcification, otherwise normal mitral  valve. Mild mitral regurgitation.  2. Peak transaortic valve gradient equals 18 mm Hg, mean  transaortic valve gradient equals 8 mm Hg, estimated aortic  valve area equals 1.9 sqcm (by continuity equation), aortic  valve velocity time integral equals 42 cm, consistent with  mild aortic stenosis.  3. Mild to moderate left atrial enlargement.  4. Normal left ventricular internal dimensions and wall  thicknesses.  5. Normal left ventricular systolic function. No segmental  wall motion abnormalities.  6. Reversal of the E-A  waves of the mitral inflow pattern  is consistent with diastolic LV dysfunction.  7. Normal right ventricular size and function.    < end of copied text >         *****A S S E S S M E N T   A N D   P L A N :  84F  with  htn, , non healing stage  4 sacral decubitus,  adm with severe anemia  anemia likely from GI blood loss  denies melena or brbpr  stool reportedly guaiac  positive  follow cbc closely  hold  aspirin  maintain Hgb =8  f/u GI, considering endoscopy  Pt was at rehab but essentially non-ambulatory (<4 mets activity)  monitor I/O with prbc  echo 2017 as above  acceptable cardiac risk for endoscopy if agreeable  BP remains elevated, meds adjusted      __________________________  STIVEN. KIM Rizo

## 2017-12-18 NOTE — PHYSICAL THERAPY INITIAL EVALUATION ADULT - ACTIVE RANGE OF MOTION EXAMINATION, REHAB EVAL
ue's arom wfl's , le's aarom wfl's/bilateral upper extremity Active ROM was WFL (within functional limits)/bilateral  lower extremity Active ROM was WFL (within functional limits)

## 2017-12-18 NOTE — PHYSICAL THERAPY INITIAL EVALUATION ADULT - BALANCE DISTURBANCE, IDENTIFIED IMPAIRMENT CONTRIBUTE, REHAB EVAL
decrease endurance/decreased strength/decreased sensation/impaired sensory feedback/impaired postural control

## 2017-12-18 NOTE — PHYSICAL THERAPY INITIAL EVALUATION ADULT - DISCHARGE DISPOSITION, PT EVAL
rehabilitation facility/anticipate return to subacute rehab then transition to LTC , spoke with Perico Valentine re case ; f/u next session for functional eval anticipate return to subacute rehab then transition to LTC , spoke with Perico Valentine re case ; f/u next session for functional eval; 12/19/17 functional eval completed subacute rehab/rehabilitation facility

## 2017-12-18 NOTE — PHYSICAL THERAPY INITIAL EVALUATION ADULT - IMPAIRMENTS FOUND, PT EVAL
muscle strength/aerobic capacity/endurance aerobic capacity/endurance/muscle strength/sensory integrity/gait, locomotion, and balance/cognitive impairment cognitive impairment/muscle strength/integumentary integrity/sensory integrity/aerobic capacity/endurance/gait, locomotion, and balance

## 2017-12-18 NOTE — PROGRESS NOTE ADULT - SUBJECTIVE AND OBJECTIVE BOX
no  cp/sob/abd pain, no melena    MEDICATIONS  (STANDING):  carvedilol 12.5 milliGRAM(s) Oral every 12 hours  dextrose 5%. 1000 milliLiter(s) (50 mL/Hr) IV Continuous <Continuous>  dextrose 50% Injectable 12.5 Gram(s) IV Push once  dextrose 50% Injectable 25 Gram(s) IV Push once  dextrose 50% Injectable 25 Gram(s) IV Push once  enalapril 5 milliGRAM(s) Oral two times a day  insulin lispro (HumaLOG) corrective regimen sliding scale   SubCutaneous three times a day before meals  insulin lispro (HumaLOG) corrective regimen sliding scale   SubCutaneous at bedtime  pantoprazole  Injectable 40 milliGRAM(s) IV Push daily  sodium chloride 0.9%. 1000 milliLiter(s) (50 mL/Hr) IV Continuous <Continuous>    MEDICATIONS  (PRN):  dextrose Gel 1 Dose(s) Oral once PRN Blood Glucose LESS THAN 70 milliGRAM(s)/deciliter  glucagon  Injectable 1 milliGRAM(s) IntraMuscular once PRN Glucose LESS THAN 70 milligrams/deciliter      Vital Signs Last 24 Hrs  T(C): 37.2 (18 Dec 2017 05:01), Max: 37.2 (17 Dec 2017 16:29)  T(F): 99 (18 Dec 2017 05:01), Max: 99 (18 Dec 2017 05:01)  HR: 70 (18 Dec 2017 05:01) (69 - 97)  BP: 157/64 (18 Dec 2017 05:01) (134/63 - 162/71)  BP(mean): --  RR: 18 (18 Dec 2017 05:01) (18 - 18)  SpO2: 100% (18 Dec 2017 05:01) (97% - 100%)  CAPILLARY BLOOD GLUCOSE      POCT Blood Glucose.: 116 mg/dL (18 Dec 2017 08:14)  POCT Blood Glucose.: 149 mg/dL (17 Dec 2017 22:12)  POCT Blood Glucose.: 153 mg/dL (17 Dec 2017 17:22)  POCT Blood Glucose.: 199 mg/dL (17 Dec 2017 11:51)    I&O's Summary    17 Dec 2017 07:01  -  18 Dec 2017 07:00  --------------------------------------------------------  IN: 2300 mL / OUT: 500 mL / NET: 1800 mL        PHYSICAL EXAM:  HEAD:  Atraumatic, Normocephalic  NECK: Supple, No JVD  CHEST/LUNG: Clear to auscultation bilaterally; No wheeze  HEART: Regular rate and rhythm;           murmur  ABDOMEN: Soft, Nontender, ;   EXTREMITIES:              edema  NEUROLOGY:  alert    LABS:                        7.6    9.57  )-----------( 464      ( 18 Dec 2017 08:39 )             25.1     12-17    141  |  105  |  13  ----------------------------<  124<H>  4.3   |  24  |  0.44<L>    Ca    9.3      17 Dec 2017 08:54                    Hemoglobin A1C, Whole Blood: 5.8 % (12-17 @ 08:17)        Consultant(s) Notes Reviewed:      Care Discussed with Consultants/Other Providers:

## 2017-12-19 LAB
BASOPHILS # BLD AUTO: 0.1 K/UL — SIGNIFICANT CHANGE UP (ref 0–0.2)
BASOPHILS NFR BLD AUTO: 0.8 % — SIGNIFICANT CHANGE UP (ref 0–2)
EOSINOPHIL # BLD AUTO: 0.2 K/UL — SIGNIFICANT CHANGE UP (ref 0–0.5)
EOSINOPHIL NFR BLD AUTO: 1.9 % — SIGNIFICANT CHANGE UP (ref 0–6)
GLUCOSE BLDC GLUCOMTR-MCNC: 150 MG/DL — HIGH (ref 70–99)
GLUCOSE BLDC GLUCOMTR-MCNC: 165 MG/DL — HIGH (ref 70–99)
GLUCOSE BLDC GLUCOMTR-MCNC: 177 MG/DL — HIGH (ref 70–99)
GLUCOSE BLDC GLUCOMTR-MCNC: 187 MG/DL — HIGH (ref 70–99)
HCT VFR BLD CALC: 30.2 % — LOW (ref 34.5–45)
HGB BLD-MCNC: 9.8 G/DL — LOW (ref 11.5–15.5)
LYMPHOCYTES # BLD AUTO: 2.3 K/UL — SIGNIFICANT CHANGE UP (ref 1–3.3)
LYMPHOCYTES # BLD AUTO: 20.3 % — SIGNIFICANT CHANGE UP (ref 13–44)
MCHC RBC-ENTMCNC: 25.5 PG — LOW (ref 27–34)
MCHC RBC-ENTMCNC: 32.4 GM/DL — SIGNIFICANT CHANGE UP (ref 32–36)
MCV RBC AUTO: 78.7 FL — LOW (ref 80–100)
MONOCYTES # BLD AUTO: 0.8 K/UL — SIGNIFICANT CHANGE UP (ref 0–0.9)
MONOCYTES NFR BLD AUTO: 6.7 % — SIGNIFICANT CHANGE UP (ref 2–14)
NEUTROPHILS # BLD AUTO: 8 K/UL — HIGH (ref 1.8–7.4)
NEUTROPHILS NFR BLD AUTO: 70.2 % — SIGNIFICANT CHANGE UP (ref 43–77)
PLATELET # BLD AUTO: 427 K/UL — HIGH (ref 150–400)
RBC # BLD: 3.84 M/UL — SIGNIFICANT CHANGE UP (ref 3.8–5.2)
RBC # FLD: 18.2 % — HIGH (ref 10.3–14.5)
WBC # BLD: 11.4 K/UL — HIGH (ref 3.8–10.5)
WBC # FLD AUTO: 11.4 K/UL — HIGH (ref 3.8–10.5)

## 2017-12-19 RX ORDER — ACETAMINOPHEN 500 MG
650 TABLET ORAL ONCE
Qty: 0 | Refills: 0 | Status: COMPLETED | OUTPATIENT
Start: 2017-12-19 | End: 2017-12-19

## 2017-12-19 RX ORDER — ASCORBIC ACID 60 MG
500 TABLET,CHEWABLE ORAL DAILY
Qty: 0 | Refills: 0 | Status: DISCONTINUED | OUTPATIENT
Start: 2017-12-19 | End: 2017-12-22

## 2017-12-19 RX ADMIN — CARVEDILOL PHOSPHATE 12.5 MILLIGRAM(S): 80 CAPSULE, EXTENDED RELEASE ORAL at 05:22

## 2017-12-19 RX ADMIN — Medication 10 MILLIGRAM(S): at 21:27

## 2017-12-19 RX ADMIN — Medication 5 MILLIGRAM(S): at 05:22

## 2017-12-19 RX ADMIN — Medication 1: at 17:16

## 2017-12-19 RX ADMIN — Medication 1: at 09:24

## 2017-12-19 RX ADMIN — SODIUM CHLORIDE 50 MILLILITER(S): 9 INJECTION INTRAMUSCULAR; INTRAVENOUS; SUBCUTANEOUS at 17:18

## 2017-12-19 RX ADMIN — CARVEDILOL PHOSPHATE 12.5 MILLIGRAM(S): 80 CAPSULE, EXTENDED RELEASE ORAL at 17:16

## 2017-12-19 RX ADMIN — PANTOPRAZOLE SODIUM 40 MILLIGRAM(S): 20 TABLET, DELAYED RELEASE ORAL at 13:02

## 2017-12-19 RX ADMIN — Medication 650 MILLIGRAM(S): at 21:37

## 2017-12-19 RX ADMIN — Medication 650 MILLIGRAM(S): at 20:35

## 2017-12-19 NOTE — DIETITIAN INITIAL EVALUATION ADULT. - NS AS NUTRI INTERV MEDICAL AND FOOD SUPPLEMENTS
Commercial beverage/add glucerna 2x/day, provide food preerences Commercial beverage/add glucerna 2x/day

## 2017-12-19 NOTE — PROGRESS NOTE ADULT - SUBJECTIVE AND OBJECTIVE BOX
Patient is a 84y old Female who presented with anemia (16 Dec 2017 10:10)    INTERVAL HPI/OVERNIGHT EVENTS:  no rectal bleed or melena reported    MEDICATIONS  (STANDING):  carvedilol 12.5 milliGRAM(s) Oral every 12 hours  dextrose 5%. 1000 milliLiter(s) (50 mL/Hr) IV Continuous <Continuous>  dextrose 50% Injectable 12.5 Gram(s) IV Push once  dextrose 50% Injectable 25 Gram(s) IV Push once  dextrose 50% Injectable 25 Gram(s) IV Push once  enalapril 5 milliGRAM(s) Oral two times a day  insulin lispro (HumaLOG) corrective regimen sliding scale   SubCutaneous three times a day before meals  insulin lispro (HumaLOG) corrective regimen sliding scale   SubCutaneous at bedtime  pantoprazole  Injectable 40 milliGRAM(s) IV Push daily  sodium chloride 0.9%. 1000 milliLiter(s) (50 mL/Hr) IV Continuous <Continuous>    MEDICATIONS  (PRN):  dextrose Gel 1 Dose(s) Oral once PRN Blood Glucose LESS THAN 70 milliGRAM(s)/deciliter  glucagon  Injectable 1 milliGRAM(s) IntraMuscular once PRN Glucose LESS THAN 70 milligrams/deciliter    Allergies  Sinemet (Unknown)    Review of Systems:  General:  No wt loss, fevers, chills, night sweats, fatigue,   ENT:  No sore throat, pain, runny nose, dysphagia  CV:  No pain, palpitatioins, hypo/hypertension  Resp:  No dyspnea, cough, tachypnea, wheezing  Neuro:  No weakness, tingling, memory problems  Heme:  No petechiae, ecchymosis, easy bruisability  Otherwise 10 point ROS negative    T(F): 99.5 (12-19-17 @ 05:11), Max: 99.5 (12-19-17 @ 05:11)  HR: 79 (12-19-17 @ 05:11) (64 - 79)  BP: 155/70 (12-19-17 @ 05:11) (151/76 - 177/69)  RR: 18 (12-19-17 @ 05:11) (18 - 18)  SpO2: 100% (12-19-17 @ 05:11) (98% - 100%)  Wt(kg): --  ,   I&O's Summary    18 Dec 2017 07:01  -  19 Dec 2017 07:00  --------------------------------------------------------  IN: 2157 mL / OUT: 600 mL / NET: 1557 mL    PHYSICAL EXAM:  Constitutional: NAD, well-developed elderly female, resting comfortably  Neck: No LAD, supple  Respiratory: clear to auscultation b/l no rales, rhonchi, wheezing  Cardiovascular: S1 and S2, RRR, no murmur  Gastrointestinal: +BS x4, soft, NT/ND, neg HSM +guerrero  Extremities: No peripheral edema, neg clubbing, cyanosis  Vascular: 2+ peripheral pulses  Skin: +sacral decubitus    LABS:                      7.6    9.57  )-----------( 464      ( 18 Dec 2017 08:39 )             25.1     141  |  105  |  13  ----------------------------<  124<H>  4.3   |  24  |  0.44<L>    Ca    9.3      17 Dec 2017 08:54    LIVER FUNCTIONS - ( 16 Dec 2017 08:09 )  Alb: 2.5 g/dL / Pro: 6.3 g/dL / ALK PHOS: 62 U/L / ALT: 5 U/L RC / AST: 11 U/L / GGT: x           RADIOLOGY & ADDITIONAL TESTS:

## 2017-12-19 NOTE — DIETITIAN INITIAL EVALUATION ADULT. - ENERGY NEEDS
IBW= 130  lbs    HT 5'6"   WT  184 lbs    BMI=29.6  Chart review:85 yo F w/ PMH of DM, HTN, lymphedema, iron deficiency anemia, urinary retention, and stage 4 sacral ulcer BIBEMS from [facility] due to low hemoglobin/hematocrit. no h/o melena/ brbpr,  stool in  er is guaiac positive  Patient had recent blood draw at her rehab hospital, where a 6.4 Hgb was recorded.  EMS was called to transport patient to [facility] ED for further management. Patient denies pain and has no complaints in the department. Denies HA, vision change, sore throat, neck pain, cp, sob, ab pain, n/v/d/c, urinary symptoms, hematochezia, melena. pt  has  MOLST /  DNR

## 2017-12-19 NOTE — PROGRESS NOTE ADULT - ASSESSMENT
83 yo F w/ PMH of DM, HTN, lymphedema, iron deficiency anemia, urinary retention, and stage 4 sacral ulcer, admitted  from [facility] for severe anemia.  Found to have guaiac positive stool, but no overt or active GI bleeding.  Of note, patient is DNR.  Anemia is likely multifactorial.  Family is refusing EGD based on patient's know wishes not to pursue an invasive work-up.    Plan:  - Cardiology is following.  - Transfuse prn.    - OK to continue consistent carbohydrate diet.  - No plans for EGD or colonoscopy per patient and family wishes.  - Wound care

## 2017-12-19 NOTE — PROGRESS NOTE ADULT - ASSESSMENT
pt  with  htn, , non healing stage  4 sacral decubitus,  with c/c guerrero,   admitted  with low  hb    anemia from acute gi blood loss  denies  melena/ brbpr  stool is guaiac  positive  follow  serial   hb  hold  aspirin   s/p prbc  in  er   gi eval, dvt  ppx, dm, follow  fs,  htn on meds  wound care to   f/p  echo, with h/o  diastolic  dysfunction  bp meds adjusted   family refusing gi  w/p, ok with prbc   s/p  2  units  , follow hb this am, pending

## 2017-12-19 NOTE — DIETITIAN INITIAL EVALUATION ADULT. - OTHER INFO
nutrition consult for poor appetite and pressure ulcer. nutrition consult for poor appetite and pressure ulcer. Patient states she just does not have the appetite she used to. Food prefernces noted. Pt is agreeable  to glucerna supplement but preferes ensure. Discussed shigh sugar content of Ensure and patietn now agreed to glucerna 2x/day and to eat 50% of her meals. pt also agreeable to take MVI and vitamin C daily to promote wound healing.

## 2017-12-19 NOTE — PROGRESS NOTE ADULT - SUBJECTIVE AND OBJECTIVE BOX
no melena,  seen by pt   c/c cesar    MEDICATIONS  (STANDING):  carvedilol 12.5 milliGRAM(s) Oral every 12 hours  dextrose 5%. 1000 milliLiter(s) (50 mL/Hr) IV Continuous <Continuous>  dextrose 50% Injectable 12.5 Gram(s) IV Push once  dextrose 50% Injectable 25 Gram(s) IV Push once  dextrose 50% Injectable 25 Gram(s) IV Push once  enalapril 10 milliGRAM(s) Oral two times a day  insulin lispro (HumaLOG) corrective regimen sliding scale   SubCutaneous three times a day before meals  insulin lispro (HumaLOG) corrective regimen sliding scale   SubCutaneous at bedtime  pantoprazole  Injectable 40 milliGRAM(s) IV Push daily  sodium chloride 0.9%. 1000 milliLiter(s) (50 mL/Hr) IV Continuous <Continuous>    MEDICATIONS  (PRN):  dextrose Gel 1 Dose(s) Oral once PRN Blood Glucose LESS THAN 70 milliGRAM(s)/deciliter  glucagon  Injectable 1 milliGRAM(s) IntraMuscular once PRN Glucose LESS THAN 70 milligrams/deciliter      Vital Signs Last 24 Hrs  T(C): 37.5 (19 Dec 2017 05:11), Max: 37.5 (19 Dec 2017 05:11)  T(F): 99.5 (19 Dec 2017 05:11), Max: 99.5 (19 Dec 2017 05:11)  HR: 80 (19 Dec 2017 10:56) (64 - 80)  BP: 142/68 (19 Dec 2017 10:56) (142/68 - 177/69)  BP(mean): --  RR: 18 (19 Dec 2017 10:56) (18 - 18)  SpO2: 98% (19 Dec 2017 10:56) (98% - 100%)  CAPILLARY BLOOD GLUCOSE      POCT Blood Glucose.: 165 mg/dL (19 Dec 2017 08:38)  POCT Blood Glucose.: 132 mg/dL (18 Dec 2017 22:08)  POCT Blood Glucose.: 138 mg/dL (18 Dec 2017 17:17)  POCT Blood Glucose.: 163 mg/dL (18 Dec 2017 12:13)    I&O's Summary    18 Dec 2017 07:01  -  19 Dec 2017 07:00  --------------------------------------------------------  IN: 2157 mL / OUT: 600 mL / NET: 1557 mL        PHYSICAL EXAM:  HEAD:  Atraumatic, Normocephalic  NECK: Supple, No JVD  CHEST/LUNG: Clear to auscultation bilaterally; No wheeze  HEART: Regular rate and rhythm;           murmur  ABDOMEN: Soft, Nontender, ;   EXTREMITIES:              edema  NEUROLOGY:  alert    LABS:                        9.9    11.2  )-----------( 388      ( 18 Dec 2017 19:29 )             30.8     12-18    141  |  105  |  9   ----------------------------<  104<H>  4.0   |  23  |  0.38<L>    Ca    8.7      18 Dec 2017 08:39                    Hemoglobin A1C, Whole Blood: 5.8 % (12-17 @ 08:17)        Consultant(s) Notes Reviewed:      Care Discussed with Consultants/Other Providers:

## 2017-12-19 NOTE — PROGRESS NOTE ADULT - SUBJECTIVE AND OBJECTIVE BOX
- Patient seen and examined.  - In summary, patient is a 84y year old woman who presented with anemia (16 Dec 2017 10:10)  - Today, patient is without complaints.         *****MEDICATIONS:    MEDICATIONS  (STANDING):  carvedilol 12.5 milliGRAM(s) Oral every 12 hours  dextrose 5%. 1000 milliLiter(s) (50 mL/Hr) IV Continuous <Continuous>  dextrose 50% Injectable 12.5 Gram(s) IV Push once  dextrose 50% Injectable 25 Gram(s) IV Push once  dextrose 50% Injectable 25 Gram(s) IV Push once  enalapril 5 milliGRAM(s) Oral two times a day  insulin lispro (HumaLOG) corrective regimen sliding scale   SubCutaneous three times a day before meals  insulin lispro (HumaLOG) corrective regimen sliding scale   SubCutaneous at bedtime  pantoprazole  Injectable 40 milliGRAM(s) IV Push daily  sodium chloride 0.9%. 1000 milliLiter(s) (50 mL/Hr) IV Continuous <Continuous>    MEDICATIONS  (PRN):  dextrose Gel 1 Dose(s) Oral once PRN Blood Glucose LESS THAN 70 milliGRAM(s)/deciliter  glucagon  Injectable 1 milliGRAM(s) IntraMuscular once PRN Glucose LESS THAN 70 milligrams/deciliter               ***** REVIEW OF SYSTEM:  GEN: no fever, no chills, no pain  RESP: no SOB, no cough, no sputum  CVS: no chest pain, no palpitations, no edema  GI: no abdominal pain, no nausea, no vomiting, no constipation, no diarrhea  : no dysurea, no frequency  NEURO: no headache, no diziness  PSYCH: no depression, not anxious  Derm : no itching, no rash         ***** VITAL SIGNS:    T(F): 99.5 (17 @ 05:11), Max: 99.5 (17 @ 05:11)  HR: 79 (17 @ 05:11) (64 - 79)  BP: 155/70 (17 @ 05:11) (151/76 - 177/69)  RR: 18 (17 @ 05:11) (18 - 18)  SpO2: 100% (17 @ 05:11) (98% - 100%)  Wt(kg): --  ,   I&O's Summary    18 Dec 2017 07:01  -  19 Dec 2017 07:00  --------------------------------------------------------  IN: 2157 mL / OUT: 600 mL / NET: 1557 mL                     *****PHYSICAL EXAM:  GEN: A&O X 3 , NAD , comfortable  HEENT: NCAT, EOMI, MMM, no icterus  NECK: Supple, No JVD  CVS: S1S2 , regular , No M/R/G appreciated  PULM: CTA B/L,  no W/R/R appreciated  ABD.: soft. non tender, non distended,  bowel sounds present  Extrem: intact pulses , no edema noted  Derm: No rash or ecchymosis noted  PSYCH: normal mood, no depression, not anxious         *****LAB AND IMAGIN.9    11.2  )-----------( 388      ( 18 Dec 2017 19:29 )             30.8               12-18    141  |  105  |  9   ----------------------------<  104<H>  4.0   |  23  |  0.38<L>    Ca    8.7      18 Dec 2017 08:39    [All pertinent recent Imaging/Reports reviewed]  < from: Transthoracic Echocardiogram (17 @ 17:46) >  Conclusions:  1. Mitral annular calcification, otherwise normal mitral  valve. Mild mitral regurgitation.  2. Peak transaortic valve gradient equals 18 mm Hg, mean  transaortic valve gradient equals 8 mm Hg, estimated aortic  valve area equals 1.9 sqcm (by continuity equation), aortic  valve velocity time integral equals 42 cm, consistent with  mild aortic stenosis.  3. Mild to moderate left atrial enlargement.  4. Normal left ventricular internal dimensions and wall  thicknesses.  5. Normal left ventricular systolic function. No segmental  wall motion abnormalities.  6. Reversal of the E-A  waves of the mitral inflow pattern  is consistent with diastolic LV dysfunction.  7. Normal right ventricular size and function.    < end of copied text >         *****A S S E S S M E N T   A N D   P L A N :  84F  with  htn, , non healing stage  4 sacral decubitus,  adm with severe anemia  anemia likely from GI blood loss  denies melena or brbpr  stool reportedly guaiac  positive  follow cbc closely  hold  aspirin  maintain Hgb =8  GI following  Pt was at rehab but essentially non-ambulatory (<4 mets activity)  monitor I/O with prbc  echo 2017 as above  acceptable cardiac risk for endoscopy if agreeable  BP remains elevated, meds adjusted      __________________________  A. KIM Rizo

## 2017-12-20 LAB
GLUCOSE BLDC GLUCOMTR-MCNC: 164 MG/DL — HIGH (ref 70–99)
GLUCOSE BLDC GLUCOMTR-MCNC: 198 MG/DL — HIGH (ref 70–99)
GLUCOSE BLDC GLUCOMTR-MCNC: 205 MG/DL — HIGH (ref 70–99)
GLUCOSE BLDC GLUCOMTR-MCNC: 206 MG/DL — HIGH (ref 70–99)
HCT VFR BLD CALC: 32.6 % — LOW (ref 34.5–45)
HGB BLD-MCNC: 10.4 G/DL — LOW (ref 11.5–15.5)
MCHC RBC-ENTMCNC: 25.3 PG — LOW (ref 27–34)
MCHC RBC-ENTMCNC: 32 GM/DL — SIGNIFICANT CHANGE UP (ref 32–36)
MCV RBC AUTO: 79.1 FL — LOW (ref 80–100)
PLATELET # BLD AUTO: 421 K/UL — HIGH (ref 150–400)
RBC # BLD: 4.12 M/UL — SIGNIFICANT CHANGE UP (ref 3.8–5.2)
RBC # FLD: 18.6 % — HIGH (ref 10.3–14.5)
WBC # BLD: 9.1 K/UL — SIGNIFICANT CHANGE UP (ref 3.8–10.5)
WBC # FLD AUTO: 9.1 K/UL — SIGNIFICANT CHANGE UP (ref 3.8–10.5)

## 2017-12-20 RX ADMIN — Medication 20 MILLIGRAM(S): at 06:29

## 2017-12-20 RX ADMIN — Medication 10 MILLIGRAM(S): at 23:12

## 2017-12-20 RX ADMIN — Medication 1: at 18:09

## 2017-12-20 RX ADMIN — CARVEDILOL PHOSPHATE 12.5 MILLIGRAM(S): 80 CAPSULE, EXTENDED RELEASE ORAL at 18:09

## 2017-12-20 RX ADMIN — CARVEDILOL PHOSPHATE 12.5 MILLIGRAM(S): 80 CAPSULE, EXTENDED RELEASE ORAL at 06:29

## 2017-12-20 RX ADMIN — Medication 500 MILLIGRAM(S): at 12:46

## 2017-12-20 RX ADMIN — PANTOPRAZOLE SODIUM 40 MILLIGRAM(S): 20 TABLET, DELAYED RELEASE ORAL at 12:46

## 2017-12-20 RX ADMIN — Medication 1: at 09:16

## 2017-12-20 RX ADMIN — Medication 2: at 12:46

## 2017-12-20 RX ADMIN — Medication 1 TABLET(S): at 12:46

## 2017-12-20 RX ADMIN — SODIUM CHLORIDE 50 MILLILITER(S): 9 INJECTION INTRAMUSCULAR; INTRAVENOUS; SUBCUTANEOUS at 18:12

## 2017-12-20 NOTE — PROGRESS NOTE ADULT - SUBJECTIVE AND OBJECTIVE BOX
Patient is a 84y old  Female who presents with a chief complaint of anemia (16 Dec 2017 10:10)      INTERVAL HPI/OVERNIGHT EVENTS:  no fever  no GI events per report    MEDICATIONS  (STANDING):  ascorbic acid 500 milliGRAM(s) Oral daily  carvedilol 12.5 milliGRAM(s) Oral every 12 hours  dextrose 5%. 1000 milliLiter(s) (50 mL/Hr) IV Continuous <Continuous>  dextrose 50% Injectable 12.5 Gram(s) IV Push once  dextrose 50% Injectable 25 Gram(s) IV Push once  dextrose 50% Injectable 25 Gram(s) IV Push once  enalapril 20 milliGRAM(s) Oral daily  enalapril 10 milliGRAM(s) Oral at bedtime  insulin lispro (HumaLOG) corrective regimen sliding scale   SubCutaneous three times a day before meals  insulin lispro (HumaLOG) corrective regimen sliding scale   SubCutaneous at bedtime  multivitamin 1 Tablet(s) Oral daily  pantoprazole  Injectable 40 milliGRAM(s) IV Push daily  sodium chloride 0.9%. 1000 milliLiter(s) (50 mL/Hr) IV Continuous <Continuous>    MEDICATIONS  (PRN):  dextrose Gel 1 Dose(s) Oral once PRN Blood Glucose LESS THAN 70 milliGRAM(s)/deciliter  glucagon  Injectable 1 milliGRAM(s) IntraMuscular once PRN Glucose LESS THAN 70 milligrams/deciliter      Allergies  Sinemet (Unknown)      Review of Systems:  General:  No wt loss, fevers, chills, night sweats, fatigue,   ENT:  No sore throat, pain, runny nose, dysphagia  CV:  No pain, palpitations, hypo/hypertension  Resp:  No dyspnea, cough, tachypnea, wheezing  Neuro:  No weakness, tingling, memory problems  Heme:  No petechiae, ecchymosis, easy bruisability    Vital Signs Last 24 Hrs  T(C): 36.9 (20 Dec 2017 04:40), Max: 37.2 (19 Dec 2017 21:17)  T(F): 98.4 (20 Dec 2017 04:40), Max: 99 (19 Dec 2017 21:17)  HR: 63 (20 Dec 2017 04:40) (63 - 80)  BP: 169/64 (20 Dec 2017 04:40) (142/68 - 174/62)  BP(mean): --  RR: 17 (20 Dec 2017 04:40) (17 - 18)  SpO2: 100% (20 Dec 2017 04:40) (98% - 100%)    PHYSICAL EXAM:  Constitutional: NAD, well-developed elderly female, sitting in bed  Neck: No LAD, supple  Respiratory: clear to auscultation b/l no rales, rhonchi, wheezing  Cardiovascular: S1 and S2, RRR, no murmur  Gastrointestinal: +BS x4, soft, NT/ND, neg HSM +guerrero  Extremities: No peripheral edema, neg clubbing, cyanosis  Vascular: 2+ peripheral pulses  Skin: +sacral decubitus    LABS:                        10.4   9.1   )-----------( 421      ( 20 Dec 2017 10:07 )             32.6       LIVER FUNCTIONS - ( 16 Dec 2017 08:09 )  Alb: 2.5 g/dL / Pro: 6.3 g/dL / ALK PHOS: 62 U/L / ALT: 5 U/L RC / AST: 11 U/L / GGT: x             RADIOLOGY & ADDITIONAL TESTS:

## 2017-12-20 NOTE — PROGRESS NOTE ADULT - ASSESSMENT
pt  with  htn, , non healing stage  4 sacral decubitus,  with c/c guerrero,   admitted  with low  hb    anemia from acute gi blood loss  denies  melena/ brbpr  stool is guaiac  positive  follow  serial   hb  hold  aspirin   s/p prbc  in  er   gi eval, dvt  ppx, dm, follow  fs,  htn on meds  wound care to   f/p  echo, with h/o  diastolic  dysfunction  bp meds adjusted   family refusing gi  w/p, ok with prbc  , follow hb this am, pending  ig  stable, then will plan for  dc in am

## 2017-12-20 NOTE — PROGRESS NOTE ADULT - SUBJECTIVE AND OBJECTIVE BOX
- Patient seen and examined.  - In summary, patient is a 84y year old woman who presented with anemia (16 Dec 2017 10:10)  - Today, patient is without complaints.         *****MEDICATIONS:    MEDICATIONS  (STANDING):  ascorbic acid 500 milliGRAM(s) Oral daily  carvedilol 12.5 milliGRAM(s) Oral every 12 hours  dextrose 5%. 1000 milliLiter(s) (50 mL/Hr) IV Continuous <Continuous>  dextrose 50% Injectable 12.5 Gram(s) IV Push once  dextrose 50% Injectable 25 Gram(s) IV Push once  dextrose 50% Injectable 25 Gram(s) IV Push once  enalapril 20 milliGRAM(s) Oral daily  enalapril 10 milliGRAM(s) Oral at bedtime  insulin lispro (HumaLOG) corrective regimen sliding scale   SubCutaneous three times a day before meals  insulin lispro (HumaLOG) corrective regimen sliding scale   SubCutaneous at bedtime  multivitamin 1 Tablet(s) Oral daily  pantoprazole  Injectable 40 milliGRAM(s) IV Push daily  sodium chloride 0.9%. 1000 milliLiter(s) (50 mL/Hr) IV Continuous <Continuous>    MEDICATIONS  (PRN):  dextrose Gel 1 Dose(s) Oral once PRN Blood Glucose LESS THAN 70 milliGRAM(s)/deciliter  glucagon  Injectable 1 milliGRAM(s) IntraMuscular once PRN Glucose LESS THAN 70 milligrams/deciliter               ***** REVIEW OF SYSTEM:  GEN: no fever, no chills, no pain  RESP: no SOB, no cough, no sputum  CVS: no chest pain, no palpitations, no edema  GI: no abdominal pain, no nausea, no vomiting, no constipation, no diarrhea  : no dysurea, no frequency  NEURO: no headache, no diziness  PSYCH: no depression, not anxious  Derm : no itching, no rash         ***** VITAL SIGNS:    T(F): 98.4 (17 @ 04:40), Max: 99 (17 @ 21:17)  HR: 63 (17 @ 04:40) (63 - 80)  BP: 169/64 (17 @ 04:40) (142/68 - 174/62)  RR: 17 (17 @ 04:40) (17 - 18)  SpO2: 100% (17 @ 04:40) (98% - 100%)  Wt(kg): --  ,   I&O's Summary    19 Dec 2017 07:01  -  20 Dec 2017 07:00  --------------------------------------------------------  IN: 2020 mL / OUT: 400 mL / NET: 1620 mL                 *****PHYSICAL EXAM:  GEN: A&O X 3 , NAD , comfortable  HEENT: NCAT, EOMI, MMM, no icterus  NECK: Supple, No JVD  CVS: S1S2 , regular , No M/R/G appreciated  PULM: CTA B/L,  no W/R/R appreciated  ABD.: soft. non tender, non distended,  bowel sounds present  Extrem: intact pulses , no edema noted  Derm: No rash or ecchymosis noted  PSYCH: normal mood, no depression, not anxious         *****LAB AND IMAGIN.8    11.4  )-----------( 427      ( 19 Dec 2017 12:15 )             30.2         [All pertinent recent Imaging/Reports reviewed]  < from: Transthoracic Echocardiogram (17 @ 17:46) >  Conclusions:  1. Mitral annular calcification, otherwise normal mitral  valve. Mild mitral regurgitation.  2. Peak transaortic valve gradient equals 18 mm Hg, mean  transaortic valve gradient equals 8 mm Hg, estimated aortic  valve area equals 1.9 sqcm (by continuity equation), aortic  valve velocity time integral equals 42 cm, consistent with  mild aortic stenosis.  3. Mild to moderate left atrial enlargement.  4. Normal left ventricular internal dimensions and wall  thicknesses.  5. Normal left ventricular systolic function. No segmental  wall motion abnormalities.  6. Reversal of the E-A  waves of the mitral inflow pattern  is consistent with diastolic LV dysfunction.  7. Normal right ventricular size and function.    < end of copied text >         *****A S S E S S M E N T   A N D   P L A N :  84F  with  htn, , non healing stage  4 sacral decubitus,  adm with severe anemia  anemia likely from GI blood loss  denies melena or brbpr  stool reportedly guaiac  positive  follow cbc closely  hold  aspirin  maintain Hgb =8  GI following  Pt was at rehab but essentially non-ambulatory (<4 mets activity)  monitor I/O with prbc  echo 2017 as above  acceptable cardiac risk for endoscopy if agreeable  BP meds adjusted, some improvement      __________________________  SUSANNE Rizo D.O.

## 2017-12-20 NOTE — PROGRESS NOTE ADULT - SUBJECTIVE AND OBJECTIVE BOX
in bed, no  complaints      MEDICATIONS  (STANDING):  ascorbic acid 500 milliGRAM(s) Oral daily  carvedilol 12.5 milliGRAM(s) Oral every 12 hours  dextrose 5%. 1000 milliLiter(s) (50 mL/Hr) IV Continuous <Continuous>  dextrose 50% Injectable 12.5 Gram(s) IV Push once  dextrose 50% Injectable 25 Gram(s) IV Push once  dextrose 50% Injectable 25 Gram(s) IV Push once  enalapril 20 milliGRAM(s) Oral daily  enalapril 10 milliGRAM(s) Oral at bedtime  insulin lispro (HumaLOG) corrective regimen sliding scale   SubCutaneous three times a day before meals  insulin lispro (HumaLOG) corrective regimen sliding scale   SubCutaneous at bedtime  multivitamin 1 Tablet(s) Oral daily  pantoprazole  Injectable 40 milliGRAM(s) IV Push daily  sodium chloride 0.9%. 1000 milliLiter(s) (50 mL/Hr) IV Continuous <Continuous>    MEDICATIONS  (PRN):  dextrose Gel 1 Dose(s) Oral once PRN Blood Glucose LESS THAN 70 milliGRAM(s)/deciliter  glucagon  Injectable 1 milliGRAM(s) IntraMuscular once PRN Glucose LESS THAN 70 milligrams/deciliter      Vital Signs Last 24 Hrs  T(C): 36.9 (20 Dec 2017 04:40), Max: 37.2 (19 Dec 2017 21:17)  T(F): 98.4 (20 Dec 2017 04:40), Max: 99 (19 Dec 2017 21:17)  HR: 63 (20 Dec 2017 04:40) (63 - 80)  BP: 169/64 (20 Dec 2017 04:40) (142/68 - 174/62)  BP(mean): --  RR: 17 (20 Dec 2017 04:40) (17 - 18)  SpO2: 100% (20 Dec 2017 04:40) (98% - 100%)  CAPILLARY BLOOD GLUCOSE      POCT Blood Glucose.: 164 mg/dL (20 Dec 2017 07:24)  POCT Blood Glucose.: 187 mg/dL (19 Dec 2017 21:19)  POCT Blood Glucose.: 177 mg/dL (19 Dec 2017 16:48)  POCT Blood Glucose.: 150 mg/dL (19 Dec 2017 12:22)    I&O's Summary    19 Dec 2017 07:01  -  20 Dec 2017 07:00  --------------------------------------------------------  IN: 2020 mL / OUT: 400 mL / NET: 1620 mL        PHYSICAL EXAM:  HEAD:  Atraumatic, Normocephalic  NECK: Supple, No JVD  CHEST/LUNG: Clear to auscultation bilaterally; No wheeze  HEART: Regular rate and rhythm;           murmur  ABDOMEN: Soft, Nontender, ;   EXTREMITIES:              edema  NEUROLOGY:  alert    LABS:                        9.8    11.4  )-----------( 427      ( 19 Dec 2017 12:15 )             30.2                         Hemoglobin A1C, Whole Blood: 5.8 % (12-17 @ 08:17)        Consultant(s) Notes Reviewed:      Care Discussed with Consultants/Other Providers:

## 2017-12-20 NOTE — PROGRESS NOTE ADULT - ASSESSMENT
83 yo F w/ PMH of DM, HTN, lymphedema, iron deficiency anemia, urinary retention, and stage 4 sacral ulcer, admitted  from [facility] for severe anemia.  Found to have guaiac positive stool, but no overt or active GI bleeding.  Of note, patient is DNR.  Anemia is likely multifactorial.  Family is refusing EGD based on patient's know wishes not to pursue an invasive work-up.    Plan:  - Cardiology is following.  - Transfuse prn.    - OK to continue consistent carbohydrate diet.  - No plans for EGD or colonoscopy per patient and family wishes.  - Wound care    Will Sign off care. Please recall prn for GI concerns.  Thank You.    Hao Song PA-C    [facility]  [phone]

## 2017-12-21 ENCOUNTER — TRANSCRIPTION ENCOUNTER (OUTPATIENT)
Age: 82
End: 2017-12-21

## 2017-12-21 LAB
GLUCOSE BLDC GLUCOMTR-MCNC: 125 MG/DL — HIGH (ref 70–99)
GLUCOSE BLDC GLUCOMTR-MCNC: 160 MG/DL — HIGH (ref 70–99)
GLUCOSE BLDC GLUCOMTR-MCNC: 171 MG/DL — HIGH (ref 70–99)
GLUCOSE BLDC GLUCOMTR-MCNC: 225 MG/DL — HIGH (ref 70–99)
HCT VFR BLD CALC: 29.7 % — LOW (ref 34.5–45)
HGB BLD-MCNC: 9.7 G/DL — LOW (ref 11.5–15.5)
MCHC RBC-ENTMCNC: 25.6 PG — LOW (ref 27–34)
MCHC RBC-ENTMCNC: 32.7 GM/DL — SIGNIFICANT CHANGE UP (ref 32–36)
MCV RBC AUTO: 78.4 FL — LOW (ref 80–100)
PLATELET # BLD AUTO: 384 K/UL — SIGNIFICANT CHANGE UP (ref 150–400)
RBC # BLD: 3.79 M/UL — LOW (ref 3.8–5.2)
RBC # FLD: 19.5 % — HIGH (ref 10.3–14.5)
WBC # BLD: 12.4 K/UL — HIGH (ref 3.8–10.5)
WBC # FLD AUTO: 12.4 K/UL — HIGH (ref 3.8–10.5)

## 2017-12-21 RX ORDER — INSULIN LISPRO 100/ML
0 VIAL (ML) SUBCUTANEOUS
Qty: 0 | Refills: 0 | COMMUNITY
Start: 2017-12-21

## 2017-12-21 RX ORDER — CARVEDILOL PHOSPHATE 80 MG/1
1 CAPSULE, EXTENDED RELEASE ORAL
Qty: 0 | Refills: 0 | COMMUNITY
Start: 2017-12-21

## 2017-12-21 RX ORDER — AMLODIPINE BESYLATE 2.5 MG/1
1 TABLET ORAL
Qty: 0 | Refills: 0 | COMMUNITY
Start: 2017-12-21

## 2017-12-21 RX ORDER — PANTOPRAZOLE SODIUM 20 MG/1
1 TABLET, DELAYED RELEASE ORAL
Qty: 0 | Refills: 0 | COMMUNITY

## 2017-12-21 RX ORDER — AMLODIPINE BESYLATE 2.5 MG/1
2.5 TABLET ORAL DAILY
Qty: 0 | Refills: 0 | Status: DISCONTINUED | OUTPATIENT
Start: 2017-12-21 | End: 2017-12-22

## 2017-12-21 RX ORDER — ASCORBIC ACID 60 MG
1 TABLET,CHEWABLE ORAL
Qty: 0 | Refills: 0 | COMMUNITY
Start: 2017-12-21

## 2017-12-21 RX ADMIN — CARVEDILOL PHOSPHATE 12.5 MILLIGRAM(S): 80 CAPSULE, EXTENDED RELEASE ORAL at 18:22

## 2017-12-21 RX ADMIN — Medication 1: at 08:22

## 2017-12-21 RX ADMIN — AMLODIPINE BESYLATE 2.5 MILLIGRAM(S): 2.5 TABLET ORAL at 12:56

## 2017-12-21 RX ADMIN — Medication 500 MILLIGRAM(S): at 12:57

## 2017-12-21 RX ADMIN — Medication 20 MILLIGRAM(S): at 06:38

## 2017-12-21 RX ADMIN — CARVEDILOL PHOSPHATE 12.5 MILLIGRAM(S): 80 CAPSULE, EXTENDED RELEASE ORAL at 06:38

## 2017-12-21 RX ADMIN — Medication 2: at 12:57

## 2017-12-21 RX ADMIN — PANTOPRAZOLE SODIUM 40 MILLIGRAM(S): 20 TABLET, DELAYED RELEASE ORAL at 12:57

## 2017-12-21 RX ADMIN — Medication 1 TABLET(S): at 12:56

## 2017-12-21 RX ADMIN — Medication 10 MILLIGRAM(S): at 21:23

## 2017-12-21 RX ADMIN — Medication 1: at 18:22

## 2017-12-21 NOTE — DISCHARGE NOTE ADULT - PATIENT PORTAL LINK FT
“You can access the Follow[employer] Patient Portal, offered by [facility], by registering with the following website: [email or web address]”

## 2017-12-21 NOTE — DISCHARGE NOTE ADULT - CARE PLAN
Principal Discharge DX:	Low hemoglobin  Goal:	Blood products within normal limits  Secondary Diagnosis:	Essential hypertension  Instructions for follow-up, activity and diet:	Low salt diet  Activity as tolerated.  Take all medication as prescribed.  Follow up with your medical doctor for routine blood pressure monitoring at your next visit.  Notify your doctor if you have any of the following symptoms:   Dizziness, Lightheadedness, Blurry vision, Headache, Chest pain, Shortness of breath  Secondary Diagnosis:	Diabetes  Instructions for follow-up, activity and diet:	HgA1C this admission 5.8  Make sure you get your HgA1c checked every three months.  If you take oral diabetes medications, check your blood glucose two times a day.  If you take insulin, check your blood glucose before meals and at bedtime.  It's important not to skip any meals.  Keep a log of your blood glucose results and always take it with you to your doctor appointments.  Keep a list of your current medications including injectables and over the counter medications and bring this medication list with you to all your doctor appointments.  If you have not seen your ophthalmologist this year call for appointment.  Check your feet daily for redness, sores, or openings. Do not self treat. If no improvement in two days call your primary care physician for an appointment. Principal Discharge DX:	Acute blood loss anemia  Goal:	Blood products within normal limits  Instructions for follow-up, activity and diet:	Secondary to Gi bleed:  Follow up with your Primary care doctor in 1 week  The primary symptoms of anemia is difficulty breathing with exertion or at rest, fatigue, bounding pulses, and/or palpitations. If you are persistently having these symptoms, you will need to seek help from your healthcare provider.  Secondary Diagnosis:	Essential hypertension  Instructions for follow-up, activity and diet:	Low salt diet  Activity as tolerated.  Take all medication as prescribed.  Follow up with your medical doctor for routine blood pressure monitoring at your next visit.  Notify your doctor if you have any of the following symptoms:   Dizziness, Lightheadedness, Blurry vision, Headache, Chest pain, Shortness of breath  Secondary Diagnosis:	Diabetes  Instructions for follow-up, activity and diet:	HgA1C this admission 5.8  Make sure you get your HgA1c checked every three months.  If you take oral diabetes medications, check your blood glucose two times a day.  If you take insulin, check your blood glucose before meals and at bedtime.  It's important not to skip any meals.  Keep a log of your blood glucose results and always take it with you to your doctor appointments.  Keep a list of your current medications including injectables and over the counter medications and bring this medication list with you to all your doctor appointments.  If you have not seen your ophthalmologist this year call for appointment.  Check your feet daily for redness, sores, or openings. Do not self treat. If no improvement in two days call your primary care physician for an appointment.

## 2017-12-21 NOTE — PROGRESS NOTE ADULT - SUBJECTIVE AND OBJECTIVE BOX
resting   no melena      MEDICATIONS  (STANDING):  amLODIPine   Tablet 2.5 milliGRAM(s) Oral daily  ascorbic acid 500 milliGRAM(s) Oral daily  carvedilol 12.5 milliGRAM(s) Oral every 12 hours  dextrose 5%. 1000 milliLiter(s) (50 mL/Hr) IV Continuous <Continuous>  dextrose 50% Injectable 12.5 Gram(s) IV Push once  dextrose 50% Injectable 25 Gram(s) IV Push once  dextrose 50% Injectable 25 Gram(s) IV Push once  enalapril 20 milliGRAM(s) Oral daily  enalapril 10 milliGRAM(s) Oral at bedtime  insulin lispro (HumaLOG) corrective regimen sliding scale   SubCutaneous three times a day before meals  insulin lispro (HumaLOG) corrective regimen sliding scale   SubCutaneous at bedtime  multivitamin 1 Tablet(s) Oral daily  pantoprazole  Injectable 40 milliGRAM(s) IV Push daily  sodium chloride 0.9%. 1000 milliLiter(s) (50 mL/Hr) IV Continuous <Continuous>    MEDICATIONS  (PRN):  dextrose Gel 1 Dose(s) Oral once PRN Blood Glucose LESS THAN 70 milliGRAM(s)/deciliter  glucagon  Injectable 1 milliGRAM(s) IntraMuscular once PRN Glucose LESS THAN 70 milligrams/deciliter      Vital Signs Last 24 Hrs  T(C): 36.6 (21 Dec 2017 06:36), Max: 36.9 (20 Dec 2017 11:59)  T(F): 97.8 (21 Dec 2017 06:36), Max: 98.4 (20 Dec 2017 11:59)  HR: 66 (21 Dec 2017 06:36) (66 - 80)  BP: 122/83 (21 Dec 2017 06:36) (122/83 - 178/66)  BP(mean): --  RR: 18 (21 Dec 2017 06:36) (18 - 18)  SpO2: 98% (21 Dec 2017 06:36) (96% - 99%)  CAPILLARY BLOOD GLUCOSE      POCT Blood Glucose.: 171 mg/dL (21 Dec 2017 07:17)  POCT Blood Glucose.: 206 mg/dL (20 Dec 2017 21:13)  POCT Blood Glucose.: 198 mg/dL (20 Dec 2017 17:13)  POCT Blood Glucose.: 205 mg/dL (20 Dec 2017 12:42)    I&O's Summary    20 Dec 2017 07:01  -  21 Dec 2017 07:00  --------------------------------------------------------  IN: 1800 mL / OUT: 710 mL / NET: 1090 mL        PHYSICAL EXAM:  HEAD:  Atraumatic, Normocephalic  NECK: Supple, No JVD  CHEST/LUNG: Clear to auscultation bilaterally; No wheeze  HEART: Regular rate and rhythm;           murmur  ABDOMEN: Soft, Nontender, ;   EXTREMITIES:              edema  NEUROLOGY:  alert    LABS:                        10.4   9.1   )-----------( 421      ( 20 Dec 2017 10:07 )             32.6                         Hemoglobin A1C, Whole Blood: 5.8 % (12-17 @ 08:17)        Consultant(s) Notes Reviewed:      Care Discussed with Consultants/Other Providers:

## 2017-12-21 NOTE — PROVIDER CONTACT NOTE (OTHER) - ACTION/TREATMENT ORDERED:
unable to irrigate, as per NP replace guerrero. guerrero replaced using sterile technique, 1100ml output via guerrero, cloudy. pt felt relief in abd pain

## 2017-12-21 NOTE — DISCHARGE NOTE ADULT - MEDICATION SUMMARY - MEDICATIONS TO STOP TAKING
I will STOP taking the medications listed below when I get home from the hospital:    lisinopril 2.5 mg oral tablet  -- 1 tab(s) by mouth once a day    aspirin 81 mg oral delayed release tablet  -- 1 tab(s) by mouth once a day    ertapenem 1 g injection  --  injectable

## 2017-12-21 NOTE — DISCHARGE NOTE ADULT - ADDITIONAL INSTRUCTIONS
Follow up with your primary healthcare provider(s) as instructed above Follow up with your primary healthcare provider(s) as instructed above  [place] was changed 12/21/17

## 2017-12-21 NOTE — PROGRESS NOTE ADULT - ASSESSMENT
pt  with  htn, , non healing stage  4 sacral decubitus,  with c/c guerrero,   admitted  with low  hb    anemia from acute gi blood loss  denies  melena/ brbpr  stool is guaiac  positive  follow  serial   hb  hold  aspirin   s/p prbc  in  er   gi eval, dvt  ppx, dm, follow  fs,  htn on meds  wound care to   f/p  echo, with h/o  diastolic  dysfunction  bp meds adjusted   family refusing gi  w/p, ok with prbc  , follow hb this am, pending  if   stable, then will plan for  dc   today

## 2017-12-21 NOTE — DISCHARGE NOTE ADULT - HOSPITAL COURSE
83 yo F w/ PMH of DM, HTN, lymphedema, iron deficiency anemia, urinary retention, and stage 4 sacral ulcer BIBEMS from [facility] due to low hemoglobin/hematocrit. Found to be guaiac positive. Pt received PRBC. Family refused work up. Hgb/Hct stabilized. PT recommended rehab. Pt had leukocytosis. ID consulted. Pt without any signs of infection. Pt was cleared by ID for discharge. Pt instructed to follow up with PMD outpatient.

## 2017-12-21 NOTE — PROVIDER CONTACT NOTE (OTHER) - BACKGROUND
pt had episodes of hypertension previous shift, amlodipine 2.5mg po ordered. enalipril 20mg and carvedilol 12.5mg po given this morning

## 2017-12-21 NOTE — PROGRESS NOTE ADULT - SUBJECTIVE AND OBJECTIVE BOX
- Patient seen and examined.  - In summary, patient is a 84y year old woman who presented with anemia (16 Dec 2017 10:10)  - Today, patient is without complaints.         *****MEDICATIONS:    MEDICATIONS  (STANDING):  ascorbic acid 500 milliGRAM(s) Oral daily  carvedilol 12.5 milliGRAM(s) Oral every 12 hours  dextrose 5%. 1000 milliLiter(s) (50 mL/Hr) IV Continuous <Continuous>  dextrose 50% Injectable 12.5 Gram(s) IV Push once  dextrose 50% Injectable 25 Gram(s) IV Push once  dextrose 50% Injectable 25 Gram(s) IV Push once  enalapril 20 milliGRAM(s) Oral daily  enalapril 10 milliGRAM(s) Oral at bedtime  insulin lispro (HumaLOG) corrective regimen sliding scale   SubCutaneous three times a day before meals  insulin lispro (HumaLOG) corrective regimen sliding scale   SubCutaneous at bedtime  multivitamin 1 Tablet(s) Oral daily  pantoprazole  Injectable 40 milliGRAM(s) IV Push daily  sodium chloride 0.9%. 1000 milliLiter(s) (50 mL/Hr) IV Continuous <Continuous>    MEDICATIONS  (PRN):  dextrose Gel 1 Dose(s) Oral once PRN Blood Glucose LESS THAN 70 milliGRAM(s)/deciliter  glucagon  Injectable 1 milliGRAM(s) IntraMuscular once PRN Glucose LESS THAN 70 milligrams/deciliter                 ***** REVIEW OF SYSTEM:  GEN: no fever, no chills, no pain  RESP: no SOB, no cough, no sputum  CVS: no chest pain, no palpitations, no edema  GI: no abdominal pain, no nausea, no vomiting, no constipation, no diarrhea  : no dysurea, no frequency  NEURO: no headache, no diziness  PSYCH: no depression, not anxious  Derm : no itching, no rash         ***** VITAL SIGNS:    T(F): 97.8 (12-21-17 @ 06:36), Max: 98.4 (12-20-17 @ 11:59)  HR: 66 (12-21-17 @ 06:36) (66 - 80)  BP: 122/83 (12-21-17 @ 06:36) (122/83 - 178/66)  RR: 18 (12-21-17 @ 06:36) (18 - 18)  SpO2: 98% (12-21-17 @ 06:36) (96% - 99%)  Wt(kg): --  ,   I&O's Summary    20 Dec 2017 07:01  -  21 Dec 2017 07:00  --------------------------------------------------------  IN: 1800 mL / OUT: 710 mL / NET: 1090 mL                 *****PHYSICAL EXAM:  GEN: A&O X 3 , NAD , comfortable  HEENT: NCAT, EOMI, MMM, no icterus  NECK: Supple, No JVD  CVS: S1S2 , regular , No M/R/G appreciated  PULM: CTA B/L,  no W/R/R appreciated  ABD.: soft. non tender, non distended,  bowel sounds present  Extrem: intact pulses , no edema noted  Derm: No rash or ecchymosis noted  PSYCH: normal mood, no depression, not anxious         *****LAB AND IMAGING:                                            10.4   9.1   )-----------( 421      ( 20 Dec 2017 10:07 )             32.6                   [All pertinent recent Imaging/Reports reviewed]  < from: Transthoracic Echocardiogram (07.21.17 @ 17:46) >  Conclusions:  1. Mitral annular calcification, otherwise normal mitral  valve. Mild mitral regurgitation.  2. Peak transaortic valve gradient equals 18 mm Hg, mean  transaortic valve gradient equals 8 mm Hg, estimated aortic  valve area equals 1.9 sqcm (by continuity equation), aortic  valve velocity time integral equals 42 cm, consistent with  mild aortic stenosis.  3. Mild to moderate left atrial enlargement.  4. Normal left ventricular internal dimensions and wall  thicknesses.  5. Normal left ventricular systolic function. No segmental  wall motion abnormalities.  6. Reversal of the E-A  waves of the mitral inflow pattern  is consistent with diastolic LV dysfunction.  7. Normal right ventricular size and function.    < end of copied text >         *****A S S E S S M E N T   A N D   P L A N :  84F  with  htn, , non healing stage  4 sacral decubitus,  adm with severe anemia  anemia from GI blood loss  cbc stabilized  hold  aspirin  maintain Hgb =8  GI following  echo 7/2017 as above  BP remains elevated, start norvasc    __________________________  A. KIM Rizo

## 2017-12-21 NOTE — DISCHARGE NOTE ADULT - MEDICATION SUMMARY - MEDICATIONS TO TAKE
I will START or STAY ON the medications listed below when I get home from the hospital:    enalapril 20 mg oral tablet  -- 1 tab(s) by mouth once a day  -- Indication: For HTN (hypertension)    enalapril 10 mg oral tablet  -- 1 tab(s) by mouth once a day (at bedtime)  -- Indication: For HTN (hypertension)    insulin lispro 100 units/mL subcutaneous solution  --  subcutaneous 3 times a day (before meals); 1 Unit(s) if Glucose 151 - 200  2 Unit(s) if Glucose 201 - 250  3 Unit(s) if Glucose 251 - 300  4 Unit(s) if Glucose 301 - 350  5 Unit(s) if Glucose 351 - 400  6 Unit(s) if Glucose Greater Than 400  -- Indication: For Diabetes    insulin lispro 100 units/mL subcutaneous solution  --  subcutaneous once a day (at bedtime); 0 Unit(s) if Glucose 0 - 250  1 Unit(s) if Glucose 251 - 300  2 Unit(s) if Glucose 301 - 350  3 Unit(s) if Glucose 351 - 400  4 Unit(s) if Glucose Greater Than 400  -- Indication: For Diabetes    carvedilol 12.5 mg oral tablet  -- 1 tab(s) by mouth every 12 hours  -- Indication: For HTN (hypertension)    amLODIPine 2.5 mg oral tablet  -- 1 tab(s) by mouth once a day  -- Indication: For HTN (hypertension)    Protonix 40 mg oral delayed release tablet  -- 1 tab(s) by mouth once a day  -- Indication: For Acid reflux    Multiple Vitamins oral tablet  -- 1 tab(s) by mouth once a day  -- Indication: For supplement    ascorbic acid 500 mg oral tablet  -- 1 tab(s) by mouth once a day  -- Indication: For supplement I will START or STAY ON the medications listed below when I get home from the hospital:    enalapril 20 mg oral tablet  -- 1 tab(s) by mouth once a day  -- Indication: For HTN (hypertension)    enalapril 10 mg oral tablet  -- 1 tab(s) by mouth once a day (at bedtime)  -- Indication: For HTN (hypertension)    insulin lispro 100 units/mL subcutaneous solution  --  subcutaneous 3 times a day (before meals); 1 Unit(s) if Glucose 151 - 200  2 Unit(s) if Glucose 201 - 250  3 Unit(s) if Glucose 251 - 300  4 Unit(s) if Glucose 301 - 350  5 Unit(s) if Glucose 351 - 400  6 Unit(s) if Glucose Greater Than 400  -- Indication: For Diabetes    insulin lispro 100 units/mL subcutaneous solution  --  subcutaneous once a day (at bedtime); 0 Unit(s) if Glucose 0 - 250  1 Unit(s) if Glucose 251 - 300  2 Unit(s) if Glucose 301 - 350  3 Unit(s) if Glucose 351 - 400  4 Unit(s) if Glucose Greater Than 400  -- Indication: For Diabetes    carvedilol 12.5 mg oral tablet  -- 1 tab(s) by mouth every 12 hours  -- Indication: For HTN (hypertension)    Norvasc 5 mg oral tablet  -- 1 tab(s) by mouth once a day  -- Indication: For HTN (hypertension)    Protonix 40 mg oral delayed release tablet  -- 1 tab(s) by mouth once a day  -- Indication: For Acid reflux    Multiple Vitamins oral tablet  -- 1 tab(s) by mouth once a day  -- Indication: For supplement    ascorbic acid 500 mg oral tablet  -- 1 tab(s) by mouth once a day  -- Indication: For supplement

## 2017-12-21 NOTE — DISCHARGE NOTE ADULT - PLAN OF CARE
Blood products within normal limits Low salt diet  Activity as tolerated.  Take all medication as prescribed.  Follow up with your medical doctor for routine blood pressure monitoring at your next visit.  Notify your doctor if you have any of the following symptoms:   Dizziness, Lightheadedness, Blurry vision, Headache, Chest pain, Shortness of breath HgA1C this admission 5.8  Make sure you get your HgA1c checked every three months.  If you take oral diabetes medications, check your blood glucose two times a day.  If you take insulin, check your blood glucose before meals and at bedtime.  It's important not to skip any meals.  Keep a log of your blood glucose results and always take it with you to your doctor appointments.  Keep a list of your current medications including injectables and over the counter medications and bring this medication list with you to all your doctor appointments.  If you have not seen your ophthalmologist this year call for appointment.  Check your feet daily for redness, sores, or openings. Do not self treat. If no improvement in two days call your primary care physician for an appointment. Secondary to Gi bleed:  Follow up with your Primary care doctor in 1 week  The primary symptoms of anemia is difficulty breathing with exertion or at rest, fatigue, bounding pulses, and/or palpitations. If you are persistently having these symptoms, you will need to seek help from your healthcare provider.

## 2017-12-21 NOTE — DISCHARGE NOTE ADULT - CARE PROVIDER_API CALL
Arsh Lilly), Internal Medicine  [address] [place] [address]  Phone: [phone]  Fax: [phone]

## 2017-12-22 VITALS
HEART RATE: 82 BPM | SYSTOLIC BLOOD PRESSURE: 169 MMHG | DIASTOLIC BLOOD PRESSURE: 66 MMHG | RESPIRATION RATE: 18 BRPM | TEMPERATURE: 99 F | OXYGEN SATURATION: 95 %

## 2017-12-22 LAB
GLUCOSE BLDC GLUCOMTR-MCNC: 167 MG/DL — HIGH (ref 70–99)
GLUCOSE BLDC GLUCOMTR-MCNC: 171 MG/DL — HIGH (ref 70–99)
GLUCOSE BLDC GLUCOMTR-MCNC: 238 MG/DL — HIGH (ref 70–99)
HCT VFR BLD CALC: 30.5 % — LOW (ref 34.5–45)
HGB BLD-MCNC: 9.7 G/DL — LOW (ref 11.5–15.5)
MCHC RBC-ENTMCNC: 25.1 PG — LOW (ref 27–34)
MCHC RBC-ENTMCNC: 31.8 GM/DL — LOW (ref 32–36)
MCV RBC AUTO: 78.9 FL — LOW (ref 80–100)
PLATELET # BLD AUTO: 382 K/UL — SIGNIFICANT CHANGE UP (ref 150–400)
RBC # BLD: 3.86 M/UL — SIGNIFICANT CHANGE UP (ref 3.8–5.2)
RBC # FLD: 19.2 % — HIGH (ref 10.3–14.5)
WBC # BLD: 10.1 K/UL — SIGNIFICANT CHANGE UP (ref 3.8–10.5)
WBC # FLD AUTO: 10.1 K/UL — SIGNIFICANT CHANGE UP (ref 3.8–10.5)

## 2017-12-22 PROCEDURE — 86901 BLOOD TYPING SEROLOGIC RH(D): CPT

## 2017-12-22 PROCEDURE — 86922 COMPATIBILITY TEST ANTIGLOB: CPT

## 2017-12-22 PROCEDURE — 97110 THERAPEUTIC EXERCISES: CPT

## 2017-12-22 PROCEDURE — 99285 EMERGENCY DEPT VISIT HI MDM: CPT | Mod: 25

## 2017-12-22 PROCEDURE — P9016: CPT

## 2017-12-22 PROCEDURE — 83036 HEMOGLOBIN GLYCOSYLATED A1C: CPT

## 2017-12-22 PROCEDURE — 80048 BASIC METABOLIC PNL TOTAL CA: CPT

## 2017-12-22 PROCEDURE — 84295 ASSAY OF SERUM SODIUM: CPT

## 2017-12-22 PROCEDURE — 86850 RBC ANTIBODY SCREEN: CPT

## 2017-12-22 PROCEDURE — 80053 COMPREHEN METABOLIC PANEL: CPT

## 2017-12-22 PROCEDURE — P9011: CPT

## 2017-12-22 PROCEDURE — 83605 ASSAY OF LACTIC ACID: CPT

## 2017-12-22 PROCEDURE — 85610 PROTHROMBIN TIME: CPT

## 2017-12-22 PROCEDURE — 97606 NEG PRS WND THER DME>50 SQCM: CPT

## 2017-12-22 PROCEDURE — 85730 THROMBOPLASTIN TIME PARTIAL: CPT

## 2017-12-22 PROCEDURE — 86900 BLOOD TYPING SEROLOGIC ABO: CPT

## 2017-12-22 PROCEDURE — 82330 ASSAY OF CALCIUM: CPT

## 2017-12-22 PROCEDURE — 93005 ELECTROCARDIOGRAM TRACING: CPT

## 2017-12-22 PROCEDURE — 36430 TRANSFUSION BLD/BLD COMPNT: CPT

## 2017-12-22 PROCEDURE — 85027 COMPLETE CBC AUTOMATED: CPT

## 2017-12-22 PROCEDURE — 97164 PT RE-EVAL EST PLAN CARE: CPT

## 2017-12-22 PROCEDURE — 99232 SBSQ HOSP IP/OBS MODERATE 35: CPT | Mod: 25

## 2017-12-22 PROCEDURE — 82272 OCCULT BLD FECES 1-3 TESTS: CPT

## 2017-12-22 PROCEDURE — 82435 ASSAY OF BLOOD CHLORIDE: CPT

## 2017-12-22 PROCEDURE — 86870 RBC ANTIBODY IDENTIFICATION: CPT

## 2017-12-22 PROCEDURE — 82962 GLUCOSE BLOOD TEST: CPT

## 2017-12-22 PROCEDURE — 86880 COOMBS TEST DIRECT: CPT

## 2017-12-22 PROCEDURE — 85014 HEMATOCRIT: CPT

## 2017-12-22 PROCEDURE — 97530 THERAPEUTIC ACTIVITIES: CPT

## 2017-12-22 PROCEDURE — 82947 ASSAY GLUCOSE BLOOD QUANT: CPT

## 2017-12-22 PROCEDURE — 82803 BLOOD GASES ANY COMBINATION: CPT

## 2017-12-22 PROCEDURE — 84132 ASSAY OF SERUM POTASSIUM: CPT

## 2017-12-22 PROCEDURE — 97163 PT EVAL HIGH COMPLEX 45 MIN: CPT

## 2017-12-22 PROCEDURE — P9040: CPT

## 2017-12-22 PROCEDURE — 86902 BLOOD TYPE ANTIGEN DONOR EA: CPT

## 2017-12-22 RX ORDER — AMLODIPINE BESYLATE 2.5 MG/1
1 TABLET ORAL
Qty: 0 | Refills: 0 | COMMUNITY
Start: 2017-12-22

## 2017-12-22 RX ORDER — AMLODIPINE BESYLATE 2.5 MG/1
5 TABLET ORAL DAILY
Qty: 0 | Refills: 0 | Status: DISCONTINUED | OUTPATIENT
Start: 2017-12-22 | End: 2017-12-22

## 2017-12-22 RX ADMIN — PANTOPRAZOLE SODIUM 40 MILLIGRAM(S): 20 TABLET, DELAYED RELEASE ORAL at 11:26

## 2017-12-22 RX ADMIN — AMLODIPINE BESYLATE 2.5 MILLIGRAM(S): 2.5 TABLET ORAL at 06:20

## 2017-12-22 RX ADMIN — CARVEDILOL PHOSPHATE 12.5 MILLIGRAM(S): 80 CAPSULE, EXTENDED RELEASE ORAL at 06:19

## 2017-12-22 RX ADMIN — Medication 1 TABLET(S): at 11:26

## 2017-12-22 RX ADMIN — Medication 20 MILLIGRAM(S): at 06:19

## 2017-12-22 RX ADMIN — Medication 500 MILLIGRAM(S): at 11:26

## 2017-12-22 RX ADMIN — Medication 1: at 12:57

## 2017-12-22 RX ADMIN — Medication 1: at 08:47

## 2017-12-22 RX ADMIN — AMLODIPINE BESYLATE 5 MILLIGRAM(S): 2.5 TABLET ORAL at 12:59

## 2017-12-22 NOTE — PROGRESS NOTE ADULT - SUBJECTIVE AND OBJECTIVE BOX
SUBJECTIVE: Pt seen, chart reviewed.  Pt feeling better.   No odor, pain, redness, increased warmth, f/c/s  tolerating VAC well    ROS skin sacral wound see HPI  all other systems negative      Physical Exam:  Vital Signs Last 24 Hrs  T(C): 37 (22 Dec 2017 11:53), Max: 37.4 (21 Dec 2017 20:07)  T(F): 98.6 (22 Dec 2017 11:53), Max: 99.3 (21 Dec 2017 20:07)  HR: 82 (22 Dec 2017 11:53) (70 - 85)  BP: 169/66 (22 Dec 2017 11:53) (112/69 - 173/80)  BP(mean): --  RR: 18 (22 Dec 2017 11:53) (17 - 18)  SpO2: 95% (22 Dec 2017 11:53) (95% - 100%)    General Appearance:  NAD, A&Ox3, MO  Versa Care P500    Skin:  moist w/ good turgor    Sacral stage iv- moist and granular  bone palpable but not exposed  (+) serosanguinous drainage  No odor, erythema, increased warmth, tenderness, induration, fluctuance      LABS:                        9.7    10.1  )-----------( 382      ( 22 Dec 2017 10:17 )             30.5

## 2017-12-22 NOTE — PROGRESS NOTE ADULT - SUBJECTIVE AND OBJECTIVE BOX
no  complaints    MEDICATIONS  (STANDING):  amLODIPine   Tablet 2.5 milliGRAM(s) Oral daily  ascorbic acid 500 milliGRAM(s) Oral daily  carvedilol 12.5 milliGRAM(s) Oral every 12 hours  dextrose 5%. 1000 milliLiter(s) (50 mL/Hr) IV Continuous <Continuous>  dextrose 50% Injectable 12.5 Gram(s) IV Push once  dextrose 50% Injectable 25 Gram(s) IV Push once  dextrose 50% Injectable 25 Gram(s) IV Push once  enalapril 20 milliGRAM(s) Oral daily  enalapril 10 milliGRAM(s) Oral at bedtime  insulin lispro (HumaLOG) corrective regimen sliding scale   SubCutaneous three times a day before meals  insulin lispro (HumaLOG) corrective regimen sliding scale   SubCutaneous at bedtime  multivitamin 1 Tablet(s) Oral daily  pantoprazole  Injectable 40 milliGRAM(s) IV Push daily    MEDICATIONS  (PRN):  dextrose Gel 1 Dose(s) Oral once PRN Blood Glucose LESS THAN 70 milliGRAM(s)/deciliter  glucagon  Injectable 1 milliGRAM(s) IntraMuscular once PRN Glucose LESS THAN 70 milligrams/deciliter      Vital Signs Last 24 Hrs  T(C): 37.1 (22 Dec 2017 06:17), Max: 37.4 (21 Dec 2017 20:07)  T(F): 98.7 (22 Dec 2017 06:17), Max: 99.3 (21 Dec 2017 20:07)  HR: 81 (22 Dec 2017 06:17) (70 - 85)  BP: 173/80 (22 Dec 2017 06:17) (112/69 - 180/82)  BP(mean): --  RR: 17 (22 Dec 2017 06:17) (17 - 18)  SpO2: 95% (22 Dec 2017 06:17) (95% - 100%)  CAPILLARY BLOOD GLUCOSE      POCT Blood Glucose.: 167 mg/dL (22 Dec 2017 08:08)  POCT Blood Glucose.: 125 mg/dL (21 Dec 2017 22:29)  POCT Blood Glucose.: 160 mg/dL (21 Dec 2017 17:36)  POCT Blood Glucose.: 225 mg/dL (21 Dec 2017 12:36)    I&O's Summary    21 Dec 2017 07:01  -  22 Dec 2017 07:00  --------------------------------------------------------  IN: 1145 mL / OUT: 1470 mL / NET: -325 mL        PHYSICAL EXAM:  HEAD:  Atraumatic, Normocephalic  NECK: Supple, No JVD  CHEST/LUNG: Clear to auscultation bilaterally; No wheeze  HEART: Regular rate and rhythm;           murmur  ABDOMEN: Soft, Nontender, ;   EXTREMITIES:              edema  NEUROLOGY:  alert    LABS:                        9.7    10.1  )-----------( 382      ( 22 Dec 2017 10:17 )             30.5                         Hemoglobin A1C, Whole Blood: 5.8 % (12-17 @ 08:17)        Consultant(s) Notes Reviewed:      Care Discussed with Consultants/Other Providers:

## 2017-12-22 NOTE — PROGRESS NOTE ADULT - ASSESSMENT
A/P: STAGE IV sacral pressure ulcer-  VAC tx as per protocol  con't Nutrition (as tolerated)  con't Offloading   con't Pericare  Care as per medicine will follow w/ you  upon discharge   f/u as outpatient at [facility] [address] [facility] [phone]  d/w attng and  team  I spent  25 minutes w/ this pt of which more than 50% of the time was spent counseling & coordinating care of this pt.

## 2017-12-22 NOTE — PROGRESS NOTE ADULT - PROVIDER SPECIALTY LIST ADULT
Cardiology
Gastroenterology
Internal Medicine
Wound Care
Internal Medicine
Internal Medicine
Intervent Cardiology

## 2017-12-22 NOTE — PROGRESS NOTE ADULT - ASSESSMENT
pt  with  htn, , non healing stage  4 sacral decubitus,  with c/c guerrero,   admitted  with low  hb    anemia from acute gi blood loss  denies  melena/ brbpr  stool is guaiac  positive  follow  serial   hb  hold  aspirin   s/p prbc  in  er   gi eval, dvt  ppx, dm, follow  fs,  htn on meds  wound care to   f/p  echo, with h/o  diastolic  dysfunction  bp meds adjusted   family refusing gi  w/p, ok with prbc  ,hb,  wbc  stable,  for  dc  today    pt with new  guerrero

## 2017-12-22 NOTE — CONSULT NOTE ADULT - ASSESSMENT
Elderly non ambulatory female with stage 4 sacral decubitus,known pelvic osteo, admitted to [facility] for anemia .  As per family wishes,no interventions being performed by GI.  She has been transfused.  Her leukocytosis is non specific,it could relate to marrow stress of anemia and also be related to transfusions.  She also has had a clogged guerrero,since remedied.  No systemic signs of infection.Her pelvic osteo could also be contributing to anemia and leukocytosis.  Suggest:  1.No role for empiric antibiotics  2.Follow conservatively.If she develops clinical signs of infection one could advise additional ID w/u and antibiotics if family would allow  3.given her "stable" appearance, no ID objection to discharge if desired by primary team.
A/P: STAGE IV sacral pressure ulcer- Aquacel while awaiting VAC     con't Nutrition (as tolerated)  con't Offloading   con't Pericare  Care as per medicine will follow w/ you  f/u as outpatient at Wound Center [address] [facility] [phone]  Seen w/ attng and D/w team  Thank you for this consult

## 2017-12-22 NOTE — CONSULT NOTE ADULT - SUBJECTIVE AND OBJECTIVE BOX
HPI:   Patient is a 84y female with a history of a stage 4 sacral decubitus,anemia,neurogenic bladder,?dementia,who was sent for admission from her SNF with a hemoglobin of 6.4.She is without any acute GI bleed,is fe defecient by report,and family does not want a work up.No fever at [facility], she has received blood transfusions, she has a wound vac on sacrum.She had poor guerrero output yesterday,required a change of catheter with good output.She deniies any abdominal pain,bladder discomfort,or respiratory symptoms.She is a poor historian, not clear how long she has been non ambulatory.She has known pelvic osteomyelitis    REVIEW OF SYSTEMS:  All other review of systems negative (Comprehensive ROS)    PAST MEDICAL & SURGICAL HISTORY:  Diabetes  HTN (hypertension)  No significant past surgical history      Allergies    Sinemet (Unknown)    Intolerances        Antimicrobials Day #  :none    Other Medications:  amLODIPine   Tablet 2.5 milliGRAM(s) Oral daily  ascorbic acid 500 milliGRAM(s) Oral daily  carvedilol 12.5 milliGRAM(s) Oral every 12 hours  dextrose 5%. 1000 milliLiter(s) IV Continuous <Continuous>  dextrose 50% Injectable 12.5 Gram(s) IV Push once  dextrose 50% Injectable 25 Gram(s) IV Push once  dextrose 50% Injectable 25 Gram(s) IV Push once  dextrose Gel 1 Dose(s) Oral once PRN  enalapril 20 milliGRAM(s) Oral daily  enalapril 10 milliGRAM(s) Oral at bedtime  glucagon  Injectable 1 milliGRAM(s) IntraMuscular once PRN  insulin lispro (HumaLOG) corrective regimen sliding scale   SubCutaneous three times a day before meals  insulin lispro (HumaLOG) corrective regimen sliding scale   SubCutaneous at bedtime  multivitamin 1 Tablet(s) Oral daily  pantoprazole  Injectable 40 milliGRAM(s) IV Push daily      FAMILY HISTORY:  No pertinent family history in first degree relatives      SOCIAL HISTORY:  Smoking: no    ETOH: no    Drug Use: no      T(F): 98.7 (12-22-17 @ 06:17), Max: 99.3 (12-21-17 @ 20:07)  HR: 81 (12-22-17 @ 06:17)  BP: 173/80 (12-22-17 @ 06:17)  RR: 17 (12-22-17 @ 06:17)  SpO2: 95% (12-22-17 @ 06:17)  Wt(kg): --    PHYSICAL EXAM:  General: alert, no acute distress  Eyes:  anicteric, no conjunctival injection, no discharge  Oropharynx: no lesions or injection 	  Neck: supple, without adenopathy  Lungs: clear to auscultation  Heart: regular rate and rhythm; no murmur, rubs or gallops  Abdomen: soft, nondistended, nontender, without mass or organomegaly  Skin: no lesions  Extremities: no clubbing, cyanosis, + edema  Neurologic: alert,mild confusion,minimal movement in legs    LAB RESULTS:                        9.7    12.4  )-----------( 384      ( 21 Dec 2017 16:39 )             29.7                 MICROBIOLOGY:  RECENT CULTURES:    none    RADIOLOGY REVIEWED:  CT scan of A/P  PROCEDURE DATE:  07/18/2017            INTERPRETATION:  CLINICAL INFORMATION: Stage IV sacral decubitus ulcer.   Evaluation for osteomyelitis.    COMPARISON: None available.    PROCEDURE:   CT of the Pelvis was performed with intravenous contrast. 90 mL of   Omnipaque 350 was administered.  Sagittal and coronal reformats were performed.    FINDINGS:    There is a large ulceration in the soft tissues at the level of the   superior aspect of theintergluteal fold overlying the lower sacrum and   coccyx, extending down to the level of bone, with marked edema and   infiltration of the subcutaneous tissues. There is severe destruction of   the lower sacrum (S5 level) and coccyx. There is also adjacent edema and   thickening in the medial aspect of the gluteal musculature bilaterally,   consistent with myositis. There are also 2 adjacent ovoid rim-enhancing   collections in the medial aspect of the right gluteal musculature,, the   larger measuring 2.8 x 1.2 x 1.0 cm, suspicious for abscesses (series 4,   image [id number]). There is presacral and precoccygeal soft tissue edema in   the pelvis. There is mild bilateral hip arthrosis. There is lower lumbar   spondylosis.  The patient is status post hysterectomy. There are   atherosclerotic vascular calcifications.    IMPRESSION: Large ulceration in the soft tissues overlying and inferior   to the sacrum and coccyx extending down to level of bone. Associated   marked destruction of the lower sacrum and coccyx, consistent with   osteomyelitis. Adjacent abscesses within the medial aspect of the right   gluteal musculature.
Patient is a 84y old  Female who presents with a chief complaint of anemia (16 Dec 2017 10:10)      HPI:  84y Female complaining of low Hemoglobin.	   85 yo F w/ PMH of DM, HTN, lymphedema, iron deficiency anemia, urinary retention, and stage 4 sacral ulcer BIBEMS from [facility] due to low hemoglobin/hematocrit. no h/o melena/ brbpr,  stool in  er is guaiac positive  Patient had recent blood draw at her rehab hospital, where a 6.4 Hgb was recorded.  EMS was called to transport patient to [facility] ED for further management. Patient denies pain and has no complaints in the department. Denies HA, vision change, sore throat, neck pain, cp, sob, ab pain, n/v/d/c, urinary symptoms, hematochezia, melena. pt  has  MOLST  form,  in  chart,   DNR  confirmed  with  family prbc  in er   gi eval (16 Dec 2017 10:10)           *****PAST MEDICAL / Surgical  HISTORY:  PAST MEDICAL & SURGICAL HISTORY:  Diabetes  HTN (hypertension)  No significant past surgical history           *****FAMILY HISTORY:  FAMILY HISTORY:  No pertinent family history in first degree relatives           *****SOCIAL HISTORY:  Alcohol: None  Smoking: None         *****ALLERGIES:   Allergies    Sinemet (Unknown)    Intolerances             *****MEDICATIONS:  MEDICATIONS  (STANDING):  carvedilol 12.5 milliGRAM(s) Oral every 12 hours  dextrose 5%. 1000 milliLiter(s) (50 mL/Hr) IV Continuous <Continuous>  dextrose 50% Injectable 12.5 Gram(s) IV Push once  dextrose 50% Injectable 25 Gram(s) IV Push once  dextrose 50% Injectable 25 Gram(s) IV Push once  insulin lispro (HumaLOG) corrective regimen sliding scale   SubCutaneous three times a day before meals  lisinopril 2.5 milliGRAM(s) Oral daily  pantoprazole  Injectable 40 milliGRAM(s) IV Push daily  sodium chloride 0.9%. 1000 milliLiter(s) (50 mL/Hr) IV Continuous <Continuous>    MEDICATIONS  (PRN):  dextrose Gel 1 Dose(s) Oral once PRN Blood Glucose LESS THAN 70 milliGRAM(s)/deciliter  glucagon  Injectable 1 milliGRAM(s) IntraMuscular once PRN Glucose LESS THAN 70 milligrams/deciliter           *****REVIEW OF SYSTEM:  GEN: no fever, no chills, no pain  RESP: no SOB, no cough, no sputum  CVS: no chest pain, no palpitations, no edema  GI: no abdominal pain, no nausea, no vomiting, no constipation, no diarrhea  : no dysurea, no frequency, no hematurea  Neuro: no headache, no dizziness  PSYCH: no anxiety, no depression  Derm : no itching, no rash         *****VITAL SIGNS:  T(C): 36.8 (17 @ 12:35), Max: 37.1 (17 @ 07:32)  HR: 80 (17 @ 12:35) (60 - 81)  BP: 172/76 (17 @ 12:35) (160/63 - 185/74)  RR: 17 (17 @ 12:35) (16 - 20)  SpO2: 98% (17 @ 12:35) (98% - 100%)  Wt(kg): --     @ 07:01  -   @ 14:57  --------------------------------------------------------  IN: 0 mL / OUT: 500 mL / NET: -500 mL             *****PHYSICAL EXAM:  GEN: A&O X 3 , NAD , comfortable  HEENT: NCAT, PERRL, MMM, hearing intact  Neck: supple , no JVD  CVS: S1S2 , regular , No M/R/G appreciated  PULM: CTA B/L,  no W/R/R appreciated  ABD.: soft. non tender, non distended,  bowel sounds present  Extrem: intact pulses , no edema   Derm: No rash , no ecchymoses  PSYCH : normal mood,  no delusion not anxious         *****LAB AND IMAGIN.7    9.3   )-----------( 522      ( 16 Dec 2017 08:09 )             23.2               12-16    142  |  104  |  15  ----------------------------<  132<H>  4.0   |  28  |  0.45<L>    Ca    8.9      16 Dec 2017 08:09    TPro  6.3  /  Alb  2.5<L>  /  TBili  0.1<L>  /  DBili  x   /  AST  11  /  ALT  5<L>  /  AlkPhos  62  12-16    PT/INR - ( 16 Dec 2017 08:09 )   PT: 12.8 sec;   INR: 1.18 ratio         PTT - ( 16 Dec 2017 08:09 )  PTT:30.5 sec                            [All pertinent recent Imaging reports reviewed]  < from: Transthoracic Echocardiogram (17 @ 17:46) >  Conclusions:  1. Mitral annular calcification, otherwise normal mitral  valve. Mild mitral regurgitation.  2. Peak transaortic valve gradient equals 18 mm Hg, mean  transaortic valve gradient equals 8 mm Hg, estimated aortic  valve area equals 1.9 sqcm (by continuity equation), aortic  valve velocity time integral equals 42 cm, consistent with  mild aortic stenosis.  3. Mild to moderate left atrial enlargement.  4. Normal left ventricular internal dimensions and wall  thicknesses.  5. Normal left ventricular systolic function. No segmental  wall motion abnormalities.  6. Reversal of the E-A  waves of the mitral inflow pattern  is consistent with diastolic LV dysfunction.  7. Normal right ventricular size and function.    < end of copied text >         *****A S S E S S M E N T   A N D   P L A N :  84F  with  htn, , non healing stage  4 sacral decubitus,  adm with severe anemia  anemia likely from GI blood loss  denies  melena brbpr  stool reportedly guaiac  positive  follow cbc closely  hold  aspirin  prbc  for Hgb >8  GI considering endoscopy  Pt was at rehab but essentially non-ambulatory (<4 mets activity)  monitor I/O with prbc  echo 2017 as above  acceptable cardiac risk for endoscopy if warranted  will monitor BP      ___________________________  Will follow with you.  Thank you,  SUSANNE Rizo D.O.
Wound SURGERY CONSULT NOTE    HPI:  84y Female complaining of low Hemoglobin.	   83 yo F w/ PMH of DM, HTN, lymphedema, iron deficiency anemia, urinary retention, and stage 4 sacral ulcer BIBEMS from [facility] due to low hemoglobin/hematocrit. no h/o melena/ brbpr,  stool in  er is guaiac positive  Patient had recent blood draw at her rehab hospital, where a 6.4 Hgb was recorded.  EMS was called to transport patient to [facility] ED for further management. Patient denies pain and has no complaints in the department. Denies HA, vision change, sore throat, neck pain, cp, sob, ab pain, n/v/d/c, urinary symptoms, hematochezia, melena. pt  has  MOLST  form,  in  chart,   DNR  confirmed  with  family prbc  in er   gi eval   Pt known to wound team.  pt with sacral wound- packing. no pain, f/c/s, odor, redness, nor warmth noted.  (+) incontinent and sedentary    PAST MEDICAL & SURGICAL HISTORY:  Diabetes  HTN (hypertension)  No significant past surgical history    REVIEW OF SYSTEMS    Skin: sacral wound worsening see HPI  all other systems negative      MEDICATIONS  (STANDING):  carvedilol 12.5 milliGRAM(s) Oral every 12 hours  dextrose 5%. 1000 milliLiter(s) (50 mL/Hr) IV Continuous <Continuous>  dextrose 50% Injectable 12.5 Gram(s) IV Push once  dextrose 50% Injectable 25 Gram(s) IV Push once  dextrose 50% Injectable 25 Gram(s) IV Push once  enalapril 5 milliGRAM(s) Oral two times a day  insulin lispro (HumaLOG) corrective regimen sliding scale   SubCutaneous three times a day before meals  insulin lispro (HumaLOG) corrective regimen sliding scale   SubCutaneous at bedtime  pantoprazole  Injectable 40 milliGRAM(s) IV Push daily  sodium chloride 0.9%. 1000 milliLiter(s) (50 mL/Hr) IV Continuous <Continuous>    MEDICATIONS  (PRN):  dextrose Gel 1 Dose(s) Oral once PRN Blood Glucose LESS THAN 70 milliGRAM(s)/deciliter  glucagon  Injectable 1 milliGRAM(s) IntraMuscular once PRN Glucose LESS THAN 70 milligrams/deciliter      Allergies    Sinemet (Unknown)    SOCIAL HISTORY: at [facility]; Denies smoking, ETOH, drugs    FAMILY HISTORY:  No pertinent family history in first degree relatives      Vital Signs Last 24 Hrs  T(C): 37.1 (18 Dec 2017 21:04), Max: 37.3 (18 Dec 2017 17:12)  T(F): 98.7 (18 Dec 2017 21:04), Max: 99.1 (18 Dec 2017 17:12)  HR: 67 (18 Dec 2017 21:04) (64 - 97)  BP: 174/70 (18 Dec 2017 21:04) (134/63 - 177/69)  BP(mean): --  RR: 18 (18 Dec 2017 21:04) (18 - 18)  SpO2: 100% (18 Dec 2017 21:04) (98% - 100%)    NAD A&Ox3 MO  Versa care P500 bed    Cardiovascular: RRR (+)m    Respiratory: CTA    Gastrointestinal soft NT/ND (+)BS    Neurology  weakened strength & sensation grossly intact/ paraesthesia    Musculoskeletal/Vascular:  passive ROM  BLE equally warm  no deformities/ contractures    Skin:  moist w/ good turgor    Sacral stage iv- moist and granular  bone palpable but not exposed  7cm x 8 cm x 3cm with undermining from 12-3 o'clock of 3.5cm  serosanguinous drainage  No odor, erythema, increased warmth, tenderness, induration, fluctuance    LABS:                        9.9    11.2  )-----------( 388      ( 18 Dec 2017 19:29 )             30.8     12-18    141  |  105  |  9   ----------------------------<  104<H>  4.0   |  23  |  0.38<L>    Ca    8.7      18 Dec 2017 08:39      Albumin, Serum: 2.5 g/dL (12-16 @ 08:09)      HgA1c  12-17 @ 08:17  5.8      RADIOLOGY & ADDITIONAL STUDIES:  Cultures:  Culture - Urine (07.20.17 @ 12:12)    Specimen Source: .Urine Catheterized    Culture Results:   No growth      < from: Xray Chest 1 View AP- PORTABLE-Urgent (07.25.17 @ 09:58) >  FINDINGS:   The heart size is normal. Right PICC line with tip in SVC  The lungs are clear. There are no pleural effusions or pneumothorax.    IMPRESSION:   Right PICC line with tip in SVC.

## 2017-12-22 NOTE — PROGRESS NOTE ADULT - SUBJECTIVE AND OBJECTIVE BOX
- Patient seen and examined.  - In summary, patient is a 84y year old woman who presented with anemia (16 Dec 2017 10:10)  - Today, patient is without complaints.         *****MEDICATIONS:    MEDICATIONS  (STANDING):  amLODIPine   Tablet 2.5 milliGRAM(s) Oral daily  ascorbic acid 500 milliGRAM(s) Oral daily  carvedilol 12.5 milliGRAM(s) Oral every 12 hours  dextrose 5%. 1000 milliLiter(s) (50 mL/Hr) IV Continuous <Continuous>  dextrose 50% Injectable 12.5 Gram(s) IV Push once  dextrose 50% Injectable 25 Gram(s) IV Push once  dextrose 50% Injectable 25 Gram(s) IV Push once  enalapril 20 milliGRAM(s) Oral daily  enalapril 10 milliGRAM(s) Oral at bedtime  insulin lispro (HumaLOG) corrective regimen sliding scale   SubCutaneous three times a day before meals  insulin lispro (HumaLOG) corrective regimen sliding scale   SubCutaneous at bedtime  multivitamin 1 Tablet(s) Oral daily  pantoprazole  Injectable 40 milliGRAM(s) IV Push daily    MEDICATIONS  (PRN):  dextrose Gel 1 Dose(s) Oral once PRN Blood Glucose LESS THAN 70 milliGRAM(s)/deciliter  glucagon  Injectable 1 milliGRAM(s) IntraMuscular once PRN Glucose LESS THAN 70 milligrams/deciliter                 ***** REVIEW OF SYSTEM:  GEN: no fever, no chills, no pain  RESP: no SOB, no cough, no sputum  CVS: no chest pain, no palpitations, no edema  GI: no abdominal pain, no nausea, no vomiting, no constipation, no diarrhea  : no dysurea, no frequency  NEURO: no headache, no diziness  PSYCH: no depression, not anxious  Derm : no itching, no rash         ***** VITAL SIGNS:    T(F): 98.7 (17 @ 06:17), Max: 99.3 (17 @ 20:07)  HR: 81 (17 @ 06:17) (70 - 85)  BP: 173/80 (17 @ 06:17) (112/69 - 180/82)  RR: 17 (17 @ 06:17) (17 - 18)  SpO2: 95% (12-22-17 @ 06:17) (95% - 100%)  Wt(kg): --  ,   I&O's Summary    21 Dec 2017 07:01  -  22 Dec 2017 07:00  --------------------------------------------------------  IN: 1145 mL / OUT: 1470 mL / NET: -325 mL                 *****PHYSICAL EXAM:  GEN: A&O X 3 , NAD , comfortable  HEENT: NCAT, EOMI, MMM, no icterus  NECK: Supple, No JVD  CVS: S1S2 , regular , No M/R/G appreciated  PULM: CTA B/L,  no W/R/R appreciated  ABD.: soft. non tender, non distended,  bowel sounds present  Extrem: intact pulses , no edema noted  Derm: No rash or ecchymosis noted  PSYCH: normal mood, no depression, not anxious         *****LAB AND IMAGIN.7    12.4  )-----------( 384      ( 21 Dec 2017 16:39 )             29.7                   [All pertinent recent Imaging/Reports reviewed]  < from: Transthoracic Echocardiogram (17 @ 17:46) >  Conclusions:  1. Mitral annular calcification, otherwise normal mitral  valve. Mild mitral regurgitation.  2. Peak transaortic valve gradient equals 18 mm Hg, mean  transaortic valve gradient equals 8 mm Hg, estimated aortic  valve area equals 1.9 sqcm (by continuity equation), aortic  valve velocity time integral equals 42 cm, consistent with  mild aortic stenosis.  3. Mild to moderate left atrial enlargement.  4. Normal left ventricular internal dimensions and wall  thicknesses.  5. Normal left ventricular systolic function. No segmental  wall motion abnormalities.  6. Reversal of the E-A  waves of the mitral inflow pattern  is consistent with diastolic LV dysfunction.  7. Normal right ventricular size and function.    < end of copied text >         *****A S S E S S M E N T   A N D   P L A N :  84F  with  htn, , non healing stage  4 sacral decubitus,  adm with severe anemia  anemia from GI blood loss  cbc stabilized  hold  aspirin  maintain Hgb =8  GI following  echo 2017 as above  titrate norvasc for BP control    __________________________  SUSANNE Rizo D.O.

## 2018-02-27 ENCOUNTER — OUTPATIENT (OUTPATIENT)
Dept: OUTPATIENT SERVICES | Facility: HOSPITAL | Age: 83
LOS: 1 days | End: 2018-02-27
Payer: MEDICARE

## 2018-02-27 ENCOUNTER — APPOINTMENT (OUTPATIENT)
Dept: NEUROLOGY | Facility: HOSPITAL | Age: 83
End: 2018-02-27

## 2018-02-27 VITALS
WEIGHT: 165 LBS | BODY MASS INDEX: 26.52 KG/M2 | HEIGHT: 66 IN | SYSTOLIC BLOOD PRESSURE: 131 MMHG | RESPIRATION RATE: 14 BRPM | HEART RATE: 84 BPM | DIASTOLIC BLOOD PRESSURE: 72 MMHG

## 2018-02-27 DIAGNOSIS — M19.90 UNSPECIFIED OSTEOARTHRITIS, UNSPECIFIED SITE: ICD-10-CM

## 2018-02-27 DIAGNOSIS — I10 ESSENTIAL (PRIMARY) HYPERTENSION: ICD-10-CM

## 2018-02-27 DIAGNOSIS — Z87.891 PERSONAL HISTORY OF NICOTINE DEPENDENCE: ICD-10-CM

## 2018-02-27 DIAGNOSIS — Z78.9 OTHER SPECIFIED HEALTH STATUS: ICD-10-CM

## 2018-02-27 DIAGNOSIS — Z85.43 PERSONAL HISTORY OF MALIGNANT NEOPLASM OF OVARY: ICD-10-CM

## 2018-02-27 DIAGNOSIS — E11.9 TYPE 2 DIABETES MELLITUS W/OUT COMPLICATIONS: ICD-10-CM

## 2018-02-27 DIAGNOSIS — R56.9 UNSPECIFIED CONVULSIONS: ICD-10-CM

## 2018-02-27 DIAGNOSIS — R29.898 OTHER SYMPTOMS AND SIGNS INVOLVING THE MUSCULOSKELETAL SYSTEM: ICD-10-CM

## 2018-02-27 DIAGNOSIS — R41.89 OTHER SYMPTOMS AND SIGNS INVOLVING COGNITIVE FUNCTIONS AND AWARENESS: ICD-10-CM

## 2018-02-27 PROCEDURE — 80053 COMPREHEN METABOLIC PANEL: CPT

## 2018-02-27 PROCEDURE — G0463: CPT

## 2018-02-27 PROCEDURE — 83036 HEMOGLOBIN GLYCOSYLATED A1C: CPT

## 2018-02-27 PROCEDURE — 82607 VITAMIN B-12: CPT

## 2018-02-27 PROCEDURE — 84207 ASSAY OF VITAMIN B-6: CPT

## 2018-02-27 PROCEDURE — 86334 IMMUNOFIX E-PHORESIS SERUM: CPT

## 2018-02-27 RX ORDER — MELOXICAM 15 MG/1
15 TABLET ORAL DAILY
Refills: 0 | Status: ACTIVE | COMMUNITY
Start: 2018-02-27

## 2018-02-27 RX ORDER — ENALAPRIL MALEATE 20 MG/1
20 TABLET ORAL
Refills: 0 | Status: ACTIVE | COMMUNITY
Start: 2018-02-27

## 2018-02-27 RX ORDER — MULTIVITAMIN
TABLET ORAL DAILY
Refills: 0 | Status: ACTIVE | COMMUNITY
Start: 2018-02-27

## 2018-02-27 RX ORDER — PANTOPRAZOLE 40 MG/1
40 TABLET, DELAYED RELEASE ORAL DAILY
Refills: 0 | Status: ACTIVE | COMMUNITY
Start: 2018-02-27

## 2018-02-27 RX ORDER — CARVEDILOL 12.5 MG/1
12.5 TABLET, FILM COATED ORAL TWICE DAILY
Refills: 0 | Status: ACTIVE | COMMUNITY
Start: 2018-02-27

## 2018-02-27 RX ORDER — NYSTATIN 100000 U/G
100000 OINTMENT TOPICAL
Refills: 0 | Status: ACTIVE | COMMUNITY
Start: 2018-02-27

## 2018-02-27 RX ORDER — MULTIVIT-MIN/FOLIC/VIT K/LYCOP 400-300MCG
500 TABLET ORAL DAILY
Refills: 0 | Status: ACTIVE | COMMUNITY
Start: 2018-02-27

## 2018-02-27 RX ORDER — METFORMIN HYDROCHLORIDE 1000 MG/1
1000 TABLET, COATED ORAL TWICE DAILY
Refills: 0 | Status: ACTIVE | COMMUNITY
Start: 2018-02-27

## 2018-02-27 RX ORDER — ACETAMINOPHEN 500 MG/1
500 TABLET, FILM COATED ORAL
Refills: 0 | Status: ACTIVE | COMMUNITY
Start: 2018-02-27

## 2018-02-27 RX ORDER — AMLODIPINE BESYLATE 5 MG/1
5 TABLET ORAL DAILY
Refills: 0 | Status: ACTIVE | COMMUNITY
Start: 2018-02-27

## 2018-02-27 RX ORDER — ACETAMINOPHEN 500 MG/1
500 TABLET ORAL
Refills: 0 | Status: ACTIVE | COMMUNITY
Start: 2018-02-27

## 2018-02-27 RX ORDER — ENALAPRIL MALEATE 10 MG/1
10 TABLET ORAL
Refills: 0 | Status: ACTIVE | COMMUNITY
Start: 2018-02-27

## 2018-02-28 DIAGNOSIS — R41.89 OTHER SYMPTOMS AND SIGNS INVOLVING COGNITIVE FUNCTIONS AND AWARENESS: ICD-10-CM

## 2018-02-28 DIAGNOSIS — R29.898 OTHER SYMPTOMS AND SIGNS INVOLVING THE MUSCULOSKELETAL SYSTEM: ICD-10-CM

## 2018-03-06 ENCOUNTER — RESULT REVIEW (OUTPATIENT)
Age: 83
End: 2018-03-06

## 2018-04-17 ENCOUNTER — APPOINTMENT (OUTPATIENT)
Dept: NEUROLOGY | Facility: HOSPITAL | Age: 83
End: 2018-04-17

## 2018-06-22 ENCOUNTER — APPOINTMENT (OUTPATIENT)
Dept: UROLOGY | Facility: CLINIC | Age: 83
End: 2018-06-22

## 2018-07-03 ENCOUNTER — EMERGENCY (EMERGENCY)
Facility: HOSPITAL | Age: 83
LOS: 1 days | Discharge: HOME | End: 2018-07-03
Attending: EMERGENCY MEDICINE
Payer: MEDICARE

## 2018-07-03 VITALS
SYSTOLIC BLOOD PRESSURE: 172 MMHG | TEMPERATURE: 98 F | HEART RATE: 102 BPM | DIASTOLIC BLOOD PRESSURE: 73 MMHG | RESPIRATION RATE: 18 BRPM | OXYGEN SATURATION: 92 %

## 2018-07-03 VITALS
HEART RATE: 72 BPM | SYSTOLIC BLOOD PRESSURE: 198 MMHG | DIASTOLIC BLOOD PRESSURE: 82 MMHG | TEMPERATURE: 99 F | RESPIRATION RATE: 17 BRPM | OXYGEN SATURATION: 99 %

## 2018-07-03 LAB
ALBUMIN SERPL ELPH-MCNC: 2.9 G/DL — LOW (ref 3.3–5)
ALP SERPL-CCNC: 60 U/L — SIGNIFICANT CHANGE UP (ref 40–120)
ALT FLD-CCNC: 8 U/L — LOW (ref 10–45)
ANION GAP SERPL CALC-SCNC: 11 MMOL/L — SIGNIFICANT CHANGE UP (ref 5–17)
ANTIBODY ID 1_1: SIGNIFICANT CHANGE UP
ANTIBODY ID 1_2: SIGNIFICANT CHANGE UP
AST SERPL-CCNC: 17 U/L — SIGNIFICANT CHANGE UP (ref 10–40)
BASOPHILS # BLD AUTO: 0 K/UL — SIGNIFICANT CHANGE UP (ref 0–0.2)
BASOPHILS NFR BLD AUTO: 0.6 % — SIGNIFICANT CHANGE UP (ref 0–2)
BILIRUB SERPL-MCNC: 0.2 MG/DL — SIGNIFICANT CHANGE UP (ref 0.2–1.2)
BLD GP AB SCN SERPL QL: POSITIVE — SIGNIFICANT CHANGE UP
BUN SERPL-MCNC: 25 MG/DL — HIGH (ref 7–23)
CALCIUM SERPL-MCNC: 9.4 MG/DL — SIGNIFICANT CHANGE UP (ref 8.4–10.5)
CHLORIDE SERPL-SCNC: 104 MMOL/L — SIGNIFICANT CHANGE UP (ref 96–108)
CO2 SERPL-SCNC: 24 MMOL/L — SIGNIFICANT CHANGE UP (ref 22–31)
CREAT SERPL-MCNC: 0.62 MG/DL — SIGNIFICANT CHANGE UP (ref 0.5–1.3)
DAT POLY-SP REAG RBC QL: NEGATIVE — SIGNIFICANT CHANGE UP
EOSINOPHIL # BLD AUTO: 0.3 K/UL — SIGNIFICANT CHANGE UP (ref 0–0.5)
EOSINOPHIL NFR BLD AUTO: 3.9 % — SIGNIFICANT CHANGE UP (ref 0–6)
GLUCOSE SERPL-MCNC: 142 MG/DL — HIGH (ref 70–99)
HCT VFR BLD CALC: 26.9 % — LOW (ref 34.5–45)
HGB BLD-MCNC: 8.5 G/DL — LOW (ref 11.5–15.5)
LYMPHOCYTES # BLD AUTO: 1.8 K/UL — SIGNIFICANT CHANGE UP (ref 1–3.3)
LYMPHOCYTES # BLD AUTO: 25.6 % — SIGNIFICANT CHANGE UP (ref 13–44)
MCHC RBC-ENTMCNC: 25.3 PG — LOW (ref 27–34)
MCHC RBC-ENTMCNC: 31.5 GM/DL — LOW (ref 32–36)
MCV RBC AUTO: 80.5 FL — SIGNIFICANT CHANGE UP (ref 80–100)
MONOCYTES # BLD AUTO: 0.5 K/UL — SIGNIFICANT CHANGE UP (ref 0–0.9)
MONOCYTES NFR BLD AUTO: 6.8 % — SIGNIFICANT CHANGE UP (ref 2–14)
NEUTROPHILS # BLD AUTO: 4.4 K/UL — SIGNIFICANT CHANGE UP (ref 1.8–7.4)
NEUTROPHILS NFR BLD AUTO: 63.1 % — SIGNIFICANT CHANGE UP (ref 43–77)
PLATELET # BLD AUTO: 398 K/UL — SIGNIFICANT CHANGE UP (ref 150–400)
POTASSIUM SERPL-MCNC: 4.4 MMOL/L — SIGNIFICANT CHANGE UP (ref 3.5–5.3)
POTASSIUM SERPL-SCNC: 4.4 MMOL/L — SIGNIFICANT CHANGE UP (ref 3.5–5.3)
PROT SERPL-MCNC: 6.6 G/DL — SIGNIFICANT CHANGE UP (ref 6–8.3)
RBC # BLD: 3.34 M/UL — LOW (ref 3.8–5.2)
RBC # FLD: 16.3 % — HIGH (ref 10.3–14.5)
RH IG SCN BLD-IMP: NEGATIVE — SIGNIFICANT CHANGE UP
SODIUM SERPL-SCNC: 139 MMOL/L — SIGNIFICANT CHANGE UP (ref 135–145)
WBC # BLD: 7 K/UL — SIGNIFICANT CHANGE UP (ref 3.8–10.5)
WBC # FLD AUTO: 7 K/UL — SIGNIFICANT CHANGE UP (ref 3.8–10.5)

## 2018-07-03 PROCEDURE — 86901 BLOOD TYPING SEROLOGIC RH(D): CPT

## 2018-07-03 PROCEDURE — 86850 RBC ANTIBODY SCREEN: CPT

## 2018-07-03 PROCEDURE — 86870 RBC ANTIBODY IDENTIFICATION: CPT

## 2018-07-03 PROCEDURE — 82272 OCCULT BLD FECES 1-3 TESTS: CPT

## 2018-07-03 PROCEDURE — 86077 PHYS BLOOD BANK SERV XMATCH: CPT

## 2018-07-03 PROCEDURE — 86880 COOMBS TEST DIRECT: CPT

## 2018-07-03 PROCEDURE — 85027 COMPLETE CBC AUTOMATED: CPT

## 2018-07-03 PROCEDURE — 80053 COMPREHEN METABOLIC PANEL: CPT

## 2018-07-03 PROCEDURE — 86922 COMPATIBILITY TEST ANTIGLOB: CPT

## 2018-07-03 PROCEDURE — 99284 EMERGENCY DEPT VISIT MOD MDM: CPT | Mod: 25,GC

## 2018-07-03 PROCEDURE — 93010 ELECTROCARDIOGRAM REPORT: CPT

## 2018-07-03 PROCEDURE — 99283 EMERGENCY DEPT VISIT LOW MDM: CPT | Mod: 25

## 2018-07-03 PROCEDURE — 86900 BLOOD TYPING SEROLOGIC ABO: CPT

## 2018-07-03 PROCEDURE — 93005 ELECTROCARDIOGRAM TRACING: CPT

## 2018-07-03 NOTE — ED PROVIDER NOTE - CARE PLAN
Principal Discharge DX:	Anemia  Assessment and plan of treatment:	1) Your hemoglobin is 8.6. You do not need a transfusion. Please follow-up with your primary care doctor within the next 3 days.  Please call today or tomorrow for an appointment.  If you cannot follow-up with your doctor(s), please return to the ED for any urgent issues.  2) If you have any worsening of symptoms or any other concerns please return to the ED immediately.  3) Please continue taking your home medications as directed.  4) You may have been given a copy of your labs and/or imaging.  Please go over these with your primary care doctor.

## 2018-07-03 NOTE — ED PROVIDER NOTE - MEDICAL DECISION MAKING DETAILS
86yo F h/o stage 4 sacral ulcer, chornic guerrero, HTN, DM2, iron deficiency anemia, on chronic supplemental O2 (unknown why) sent in by [facility] for low Hgb 6.8. No obvious source of bleed. Hemocult neg. Pt with h/o anemia. Will rpt labs, type and screen, transfuse if necessary.

## 2018-07-03 NOTE — ED PROVIDER NOTE - PLAN OF CARE
1) Your hemoglobin is 8.6. You do not need a transfusion. Please follow-up with your primary care doctor within the next 3 days.  Please call today or tomorrow for an appointment.  If you cannot follow-up with your doctor(s), please return to the ED for any urgent issues.  2) If you have any worsening of symptoms or any other concerns please return to the ED immediately.  3) Please continue taking your home medications as directed.  4) You may have been given a copy of your labs and/or imaging.  Please go over these with your primary care doctor.

## 2018-07-03 NOTE — ED ADULT NURSE NOTE - OBJECTIVE STATEMENT
Pt is a 86 yo female sent here from her NH for low H&H, pt states that she has been feeling weak for the past several months and has been having a few episodes of diarrhea. Pts states that she had her blood drawn last night and was told it was low, EMA states the hemoglobin is 6.8. She denies any CP or SOB no N/V no cough fever or chills. Pt reports using 2 L N/C at baseline in the NH. She is bed bound since October/November, she has a urine guerrero cath in place from the NH and a stage 3 pressure ulcer on the sacrum area. She has lower extremity edema at baseline which is worsened over the past several weeks as per pt.

## 2018-07-03 NOTE — ED PROVIDER NOTE - PHYSICAL EXAMINATION
Vitals: WNL  Gen: laying comfortably in NAD with NC in place  Head: NCAT  ENT: sclerae white, anicterus, moist mucous membranes. No exudates. Neck supple, no LAD,  no carotid bruits, no JVD  CV: RRR. Audible S1 and S2. No murmurs, rubs, gallops, S3, nor S4, 2+ radial and DP pulses   Pulm: Clear to auscultation bilaterally. No wheezes, rales, or rhonchi  Abd: soft, normoactive BS x4, NTND, no rebound, no guarding, no rashes  Musculoskeletal:  No peripheral edema  Skin: large sacral decub ulcer  Neurologic: AAOx3

## 2018-07-03 NOTE — ED PROVIDER NOTE - NS ED ROS FT
Constitutional: no fevers, chills  HEENT: no visual changes, no sore throat, no rhinorrhea  CV: no cp  Resp: no sob  GI: no abd pain, n/v, + diarrhea, no constipation  : no dysuria, hematuria  MSK: no joint pains  skin: no rashes  neuro: no HA, no confusion

## 2018-07-03 NOTE — ED PROVIDER NOTE - ATTENDING CONTRIBUTION TO CARE
86yo F h/o stage 4 sacral ulcer, chornic guerrero, HTN, DM2, iron deficiency anemia, on chronic supplemental O2, biba from [facility] with abnormal blood results, found to have hgb 6.8. pt states some generalized malaise, but denies cp, sob, melena, hematochezia, hematuria, n/v. has received blood transfusion in past.    PE: Elderly female, awake, alert, in NAD, NCAT, MMM, Trachea midline, Normal conjunctiva, lungs CTAB, S1/S2 RRR, Normal perfusion, 2+ radial pulses bilat, Abdomen Soft, NTND, No rebound/guarding, No LE edema. +large 4+ sacral ulcer.    85F with pmh as noted including hx iron def anemia p/w finding of anemia on outside labs. afebrile in ED, not tachycardic. is hypertensive but states did not take her meds this AM. denies any sig complaints other than gen malaise. to check cbc eval for anemia, cmp eval for metabolic disturbance, send type and screen. check ekg. re-evaluate re need for transfusion. - Js Reed MD

## 2018-07-03 NOTE — ED PROVIDER NOTE - OBJECTIVE STATEMENT
86yo F h/o stage 4 sacral ulcer, chornic guerrero, HTN, DM2, iron deficiency anemia, on chronic supplemental O2 (uknown why) sent in by [facility] for low Hgb 6.8. Pt denies chest pain, sob, epistaxis, hematochezia, melena, hematuria. Pt was getting bw for w/u of chronic diarrhea. Has received blood transfusion years ago.

## 2018-07-03 NOTE — ED PROVIDER NOTE - PROGRESS NOTE DETAILS
[place] HOWELL PGy1: hgb 8.5. no transfusion necessary. pt feels well. discussed with Dr Arsh Groves who sent pt in. No other concerns per dr groves. will d/c. pt re-assessed. continues to be asymptomtic. updated on results and plan for f/u with her doctor, dr groves. discussed proper return precautions and plan with pt and daughter. arranging for transport. - Js Reed MD

## 2018-07-18 ENCOUNTER — EMERGENCY (EMERGENCY)
Facility: HOSPITAL | Age: 83
LOS: 1 days | Discharge: HOME | End: 2018-07-18
Attending: EMERGENCY MEDICINE
Payer: MEDICARE

## 2018-07-18 VITALS
RESPIRATION RATE: 18 BRPM | HEART RATE: 68 BPM | TEMPERATURE: 98 F | OXYGEN SATURATION: 100 % | DIASTOLIC BLOOD PRESSURE: 78 MMHG | SYSTOLIC BLOOD PRESSURE: 176 MMHG

## 2018-07-18 VITALS
DIASTOLIC BLOOD PRESSURE: 77 MMHG | SYSTOLIC BLOOD PRESSURE: 164 MMHG | OXYGEN SATURATION: 97 % | TEMPERATURE: 98 F | RESPIRATION RATE: 19 BRPM | HEART RATE: 73 BPM

## 2018-07-18 LAB
ALBUMIN SERPL ELPH-MCNC: 3.2 G/DL — LOW (ref 3.3–5)
ALP SERPL-CCNC: 50 U/L — SIGNIFICANT CHANGE UP (ref 40–120)
ALT FLD-CCNC: 7 U/L — LOW (ref 10–45)
ANION GAP SERPL CALC-SCNC: 14 MMOL/L — SIGNIFICANT CHANGE UP (ref 5–17)
APPEARANCE UR: ABNORMAL
APTT BLD: 28.8 SEC — SIGNIFICANT CHANGE UP (ref 27.5–37.4)
AST SERPL-CCNC: 13 U/L — SIGNIFICANT CHANGE UP (ref 10–40)
BACTERIA # UR AUTO: ABNORMAL /HPF
BASOPHILS # BLD AUTO: 0 K/UL — SIGNIFICANT CHANGE UP (ref 0–0.2)
BASOPHILS NFR BLD AUTO: 0.5 % — SIGNIFICANT CHANGE UP (ref 0–2)
BILIRUB SERPL-MCNC: 0.2 MG/DL — SIGNIFICANT CHANGE UP (ref 0.2–1.2)
BILIRUB UR-MCNC: NEGATIVE — SIGNIFICANT CHANGE UP
BLD GP AB SCN SERPL QL: POSITIVE — SIGNIFICANT CHANGE UP
BUN SERPL-MCNC: 20 MG/DL — SIGNIFICANT CHANGE UP (ref 7–23)
CALCIUM SERPL-MCNC: 9.1 MG/DL — SIGNIFICANT CHANGE UP (ref 8.4–10.5)
CHLORIDE SERPL-SCNC: 106 MMOL/L — SIGNIFICANT CHANGE UP (ref 96–108)
CO2 SERPL-SCNC: 23 MMOL/L — SIGNIFICANT CHANGE UP (ref 22–31)
COLOR SPEC: SIGNIFICANT CHANGE UP
CREAT SERPL-MCNC: 0.55 MG/DL — SIGNIFICANT CHANGE UP (ref 0.5–1.3)
DAT POLY-SP REAG RBC QL: NEGATIVE — SIGNIFICANT CHANGE UP
DIFF PNL FLD: NEGATIVE — SIGNIFICANT CHANGE UP
EOSINOPHIL # BLD AUTO: 0.2 K/UL — SIGNIFICANT CHANGE UP (ref 0–0.5)
EOSINOPHIL NFR BLD AUTO: 2.2 % — SIGNIFICANT CHANGE UP (ref 0–6)
GLUCOSE SERPL-MCNC: 116 MG/DL — HIGH (ref 70–99)
GLUCOSE UR QL: NEGATIVE — SIGNIFICANT CHANGE UP
HCT VFR BLD CALC: 26.2 % — LOW (ref 34.5–45)
HGB BLD-MCNC: 8.4 G/DL — LOW (ref 11.5–15.5)
INR BLD: 1.03 RATIO — SIGNIFICANT CHANGE UP (ref 0.88–1.16)
KETONES UR-MCNC: NEGATIVE — SIGNIFICANT CHANGE UP
LEUKOCYTE ESTERASE UR-ACNC: ABNORMAL
LYMPHOCYTES # BLD AUTO: 1.9 K/UL — SIGNIFICANT CHANGE UP (ref 1–3.3)
LYMPHOCYTES # BLD AUTO: 26.5 % — SIGNIFICANT CHANGE UP (ref 13–44)
MCHC RBC-ENTMCNC: 25.6 PG — LOW (ref 27–34)
MCHC RBC-ENTMCNC: 31.9 GM/DL — LOW (ref 32–36)
MCV RBC AUTO: 80.2 FL — SIGNIFICANT CHANGE UP (ref 80–100)
MONOCYTES # BLD AUTO: 0.4 K/UL — SIGNIFICANT CHANGE UP (ref 0–0.9)
MONOCYTES NFR BLD AUTO: 5.2 % — SIGNIFICANT CHANGE UP (ref 2–14)
NEUTROPHILS # BLD AUTO: 4.7 K/UL — SIGNIFICANT CHANGE UP (ref 1.8–7.4)
NEUTROPHILS NFR BLD AUTO: 65.7 % — SIGNIFICANT CHANGE UP (ref 43–77)
NITRITE UR-MCNC: NEGATIVE — SIGNIFICANT CHANGE UP
PH UR: 8.5 — HIGH (ref 5–8)
PLATELET # BLD AUTO: 403 K/UL — HIGH (ref 150–400)
POTASSIUM SERPL-MCNC: 4.8 MMOL/L — SIGNIFICANT CHANGE UP (ref 3.5–5.3)
POTASSIUM SERPL-SCNC: 4.8 MMOL/L — SIGNIFICANT CHANGE UP (ref 3.5–5.3)
PROT SERPL-MCNC: 6.4 G/DL — SIGNIFICANT CHANGE UP (ref 6–8.3)
PROT UR-MCNC: 100 MG/DL
PROTHROM AB SERPL-ACNC: 11.2 SEC — SIGNIFICANT CHANGE UP (ref 9.8–12.7)
RBC # BLD: 3.26 M/UL — LOW (ref 3.8–5.2)
RBC # FLD: 16 % — HIGH (ref 10.3–14.5)
RBC CASTS # UR COMP ASSIST: SIGNIFICANT CHANGE UP /HPF (ref 0–2)
RH IG SCN BLD-IMP: NEGATIVE — SIGNIFICANT CHANGE UP
SODIUM SERPL-SCNC: 143 MMOL/L — SIGNIFICANT CHANGE UP (ref 135–145)
SP GR SPEC: 1.02 — SIGNIFICANT CHANGE UP (ref 1.01–1.02)
UROBILINOGEN FLD QL: NEGATIVE — SIGNIFICANT CHANGE UP
WBC # BLD: 7.2 K/UL — SIGNIFICANT CHANGE UP (ref 3.8–10.5)
WBC # FLD AUTO: 7.2 K/UL — SIGNIFICANT CHANGE UP (ref 3.8–10.5)
WBC UR QL: SIGNIFICANT CHANGE UP /HPF (ref 0–5)

## 2018-07-18 PROCEDURE — 86900 BLOOD TYPING SEROLOGIC ABO: CPT

## 2018-07-18 PROCEDURE — 85610 PROTHROMBIN TIME: CPT

## 2018-07-18 PROCEDURE — 80053 COMPREHEN METABOLIC PANEL: CPT

## 2018-07-18 PROCEDURE — 85730 THROMBOPLASTIN TIME PARTIAL: CPT

## 2018-07-18 PROCEDURE — 86077 PHYS BLOOD BANK SERV XMATCH: CPT

## 2018-07-18 PROCEDURE — 86880 COOMBS TEST DIRECT: CPT

## 2018-07-18 PROCEDURE — 82272 OCCULT BLD FECES 1-3 TESTS: CPT

## 2018-07-18 PROCEDURE — 86922 COMPATIBILITY TEST ANTIGLOB: CPT

## 2018-07-18 PROCEDURE — 85027 COMPLETE CBC AUTOMATED: CPT

## 2018-07-18 PROCEDURE — 99283 EMERGENCY DEPT VISIT LOW MDM: CPT

## 2018-07-18 PROCEDURE — 86850 RBC ANTIBODY SCREEN: CPT

## 2018-07-18 PROCEDURE — 81001 URINALYSIS AUTO W/SCOPE: CPT

## 2018-07-18 PROCEDURE — 86901 BLOOD TYPING SEROLOGIC RH(D): CPT

## 2018-07-18 PROCEDURE — 99284 EMERGENCY DEPT VISIT MOD MDM: CPT | Mod: GC

## 2018-07-18 PROCEDURE — 86870 RBC ANTIBODY IDENTIFICATION: CPT

## 2018-07-18 RX ORDER — CEPHALEXIN 500 MG
500 CAPSULE ORAL
Qty: 0 | Refills: 0 | Status: DISCONTINUED | OUTPATIENT
Start: 2018-07-18 | End: 2018-07-22

## 2018-07-18 RX ORDER — SODIUM CHLORIDE 9 MG/ML
500 INJECTION INTRAMUSCULAR; INTRAVENOUS; SUBCUTANEOUS ONCE
Qty: 0 | Refills: 0 | Status: COMPLETED | OUTPATIENT
Start: 2018-07-18 | End: 2018-07-18

## 2018-07-18 RX ADMIN — Medication 500 MILLIGRAM(S): at 16:11

## 2018-07-18 NOTE — ED PROVIDER NOTE - ATTENDING CONTRIBUTION TO CARE
Patient Patient is an 84 yo F w/ hx of DM, HTN, chronic guerrero sent in for evaluation of anemia and possible transfusion. Per has a history of anemia and transfusions. Denies weakness, dizziness, abdominal pain, chest pain, rectal bleeding or black stools. Patient is oriented to self, year, place and president.   VS noted  Gen. no acute distress, elderly F  HEENT: EOMI, mmm  Lungs: CTAB/L no C/ W /R   CVS: RRR   Abd; Soft non tender, non distended, rectal: done by Dr. Hawk: guaiac negative  Ext: mild b/l pitting edema  Skin: no rash  Neuro alert, follows commands non focal clear speech  a/p: hx of anemia, will check labs, guaiac negative. if needed, will admit for transfusion   - Logan HOWELL

## 2018-07-18 NOTE — ED PROVIDER NOTE - CONSTITUTIONAL, MLM
normal... Well appearing,  awake, alert, oriented to person, place, time/situation and in no apparent distress.

## 2018-07-18 NOTE — ED ADULT NURSE NOTE - DISCHARGE TEACHING
Follow up with PMD, return for new or worsening s/s (evidence of bleeding, SOB, CP, dizziness, lethargy)

## 2018-07-18 NOTE — ED ADULT NURSE REASSESSMENT NOTE - NS ED NURSE REASSESS COMMENT FT1
[facility] here, pt erin, MD Romano aware of pt's BP and states it is okay to send home. Pt stable, guerrero catheter still in place draining clear yellow urine.

## 2018-07-18 NOTE — ED PROVIDER NOTE - MUSCULOSKELETAL, MLM
Pt states legs are more swollen than usual. Rt leg has weeping. Redness in Bilateral legs. Pitting edema +1 bilateral legs. Denies pain, Coughing, SOB, Fever, N/V/D. Spine appears normal, range of motion is not limited, no muscle or joint tenderness

## 2018-07-18 NOTE — ED ADULT NURSE REASSESSMENT NOTE - NS ED NURSE REASSESS COMMENT FT1
Pt remains a&oX4, resting comfortably in bed in no apparent distress. Pt's BP was elevated- MD Romano aware and states that is OK. Pt denies ha, dizziness, light-headedness, n/v, SOB, CP at this time. Pt remains on 3L NC with SpO2 of 100. Pt awaiting nonemergent ambulete

## 2018-07-18 NOTE — ED PROVIDER NOTE - PROGRESS NOTE DETAILS
ConnollyMD - Hg 8.4, asymptomatic - no indication for transfusion. Carine HOWELL - Spoke to Dr. Lilly (PMD), agrees with no transfusion provided Hb level of 8.4 and asymptomatic. Agrees with no further work up. Carine HOWELL - [facility] contacted - patient w/o urinary symptoms. Will likely repeat urine at facility and decide on antibiotics. Contact number provided to facility in case physician caring for patient has any questions.

## 2018-07-18 NOTE — ED ADULT NURSE REASSESSMENT NOTE - NS ED NURSE REASSESS COMMENT FT1
Awaiting nonemergent ambulete. Pt resting in bed in no apparent distress at this time with no complaints

## 2018-07-18 NOTE — ED PROVIDER NOTE - OBJECTIVE STATEMENT
84yo F h/o chronic guerrero, HTN, DM2, iron deficiency anemia (req prior blood transfusions), on chronic supplemental O2 (uknown why) sent in by [facility] for low Hgb 6.9 (Hct 22) drawn on 7/17/18. Pt. states that she has a chronic history of anemia but is not sure why. She confirms (as in her records from [facility]) that she was sent in for low "blood levels". She otherwise has no new complaints. She denies weakness, dizieness, sensation of passing out, palpitations, chest pain, cough, shortness of breath. She denies any source of bleeding, including:  red or black stools, epistaxis, ecchymosis, hematuria. She denies abdominal pain, back or flank pain.

## 2018-07-18 NOTE — ED ADULT NURSE NOTE - OBJECTIVE STATEMENT
9 yo  F presents to ED A+ox3 by EMS from nursing home for "low H and H." Pt. arrives with guerrero in place and wearing 3L NC, pt. states she "always" uses oxygen and has a guerrero for chronic UTIs. Pt. states she has routine labs drawn every few weeks, had labs done in yesterday and was told her H and H was low. Pt. reports mild lightheadedness. Pt. denies fever, chills, chest pain, abdominal pain, SOB, dizziness. Skin warm dry and of color appropriate for ethnicity. Breathing unlabored on RA. Abdomen soft, nondistended, nontender. Pt. states is nonambulatory at baseline. Guerrero bag changed in ED, cloudy yellow output noted. Speech clear. Pt. denies recent falls at nursing facility. Red socks applied, bed in lowest position, side rails up. Comfort and safety measures in place.

## 2018-07-18 NOTE — ED PROVIDER NOTE - GASTROINTESTINAL, MLM
Abdomen soft, non-tender, no guarding. Abdomen soft, non-tender, no guarding. Rectal: Guaiac negative w/ brown stool per glove

## 2018-07-18 NOTE — ED ADULT NURSE REASSESSMENT NOTE - NS ED NURSE REASSESS COMMENT FT1
pt noted to have unstageble wound to sacrum. aquacel applied to wound. Multiple RNs made several attempts to obtain IV access, unable to obtain access, MD Hawk aware, states US team to be contacted when labs result.

## 2019-03-29 PROBLEM — D64.9 ANEMIA, UNSPECIFIED: Chronic | Status: ACTIVE | Noted: 2018-07-18

## 2019-04-23 ENCOUNTER — OUTPATIENT (OUTPATIENT)
Dept: OUTPATIENT SERVICES | Facility: HOSPITAL | Age: 84
LOS: 1 days | Discharge: HOME | End: 2019-04-23

## 2019-04-23 DIAGNOSIS — D64.9 ANEMIA, UNSPECIFIED: ICD-10-CM

## 2019-04-25 ENCOUNTER — RESULT REVIEW (OUTPATIENT)
Age: 84
End: 2019-04-25

## 2019-04-25 ENCOUNTER — APPOINTMENT (OUTPATIENT)
Dept: HEMATOLOGY ONCOLOGY | Facility: CLINIC | Age: 84
End: 2019-04-25
Payer: MEDICARE

## 2019-04-25 VITALS
DIASTOLIC BLOOD PRESSURE: 69 MMHG | HEART RATE: 59 BPM | OXYGEN SATURATION: 92 % | SYSTOLIC BLOOD PRESSURE: 154 MMHG | RESPIRATION RATE: 16 BRPM | TEMPERATURE: 97.9 F

## 2019-04-25 LAB
BASOPHILS # BLD AUTO: 0 K/UL — SIGNIFICANT CHANGE UP (ref 0–0.2)
BASOPHILS NFR BLD AUTO: 0.6 % — SIGNIFICANT CHANGE UP (ref 0–2)
EOSINOPHIL # BLD AUTO: 0.3 K/UL — SIGNIFICANT CHANGE UP (ref 0–0.5)
EOSINOPHIL NFR BLD AUTO: 3.7 % — SIGNIFICANT CHANGE UP (ref 0–6)
HCT VFR BLD CALC: 36.4 % — SIGNIFICANT CHANGE UP (ref 34.5–45)
HGB BLD-MCNC: 11.7 G/DL — SIGNIFICANT CHANGE UP (ref 11.5–15.5)
LYMPHOCYTES # BLD AUTO: 2.5 K/UL — SIGNIFICANT CHANGE UP (ref 1–3.3)
LYMPHOCYTES # BLD AUTO: 32.1 % — SIGNIFICANT CHANGE UP (ref 13–44)
MCHC RBC-ENTMCNC: 25.6 PG — LOW (ref 27–34)
MCHC RBC-ENTMCNC: 32.3 G/DL — SIGNIFICANT CHANGE UP (ref 32–36)
MCV RBC AUTO: 79.4 FL — LOW (ref 80–100)
MONOCYTES # BLD AUTO: 0.8 K/UL — SIGNIFICANT CHANGE UP (ref 0–0.9)
MONOCYTES NFR BLD AUTO: 9.9 % — SIGNIFICANT CHANGE UP (ref 2–14)
NEUTROPHILS # BLD AUTO: 4.2 K/UL — SIGNIFICANT CHANGE UP (ref 1.8–7.4)
NEUTROPHILS NFR BLD AUTO: 53.7 % — SIGNIFICANT CHANGE UP (ref 43–77)
PLATELET # BLD AUTO: 205 K/UL — SIGNIFICANT CHANGE UP (ref 150–400)
RBC # BLD: 4.59 M/UL — SIGNIFICANT CHANGE UP (ref 3.8–5.2)
RBC # FLD: 17.3 % — HIGH (ref 10.3–14.5)
WBC # BLD: 7.9 K/UL — SIGNIFICANT CHANGE UP (ref 3.8–10.5)
WBC # FLD AUTO: 7.9 K/UL — SIGNIFICANT CHANGE UP (ref 3.8–10.5)

## 2019-04-25 PROCEDURE — 99204 OFFICE O/P NEW MOD 45 MIN: CPT

## 2019-06-03 NOTE — CONSULT LETTER
[Consult Letter:] : I had the pleasure of evaluating your patient, [unfilled]. [Dear  ___] : Dear  [unfilled], [Please see my note below.] : Please see my note below. [Sincerely,] : Sincerely, [FreeTextEntry2] : [facility]\par [address]\[place] [address] [FreeTextEntry3] : Gisela Romeo MD\par  of Medicine\par Hematology Oncology\par [facility]\par [employer] [facility] [employer] [facility]\par Phone: [phone]\par Fax: [phone]\par

## 2019-06-03 NOTE — HISTORY OF PRESENT ILLNESS
[Disease:__________________________] : Disease: [unfilled] [de-identified] : This is an 86 year old female who is here to see me for anemia.\par \par She lives in a nursing home and is unable to provide any history. She is also with an aide, but they are not familiar with her case. \par She has a PMH of DM2, HTN, Sacral ulcer, lower extremety weakness, inability to ambulate since hospitalization in late 2017 and poor cognition.  She has been seeing someone from neurology for work up.  She also seems to have had iron deficiency anemia in the past.  She has been started on procrit at least since 2/2019. She has normal renal function on previous values. \par \par

## 2019-06-03 NOTE — PHYSICAL EXAM
[Capable of only limited self care, confined to bed or chair more than 50% of waking hours] : Status 3- Capable of only limited self care, confined to bed or chair more than 50% of waking hours [Normal] : affect appropriate [de-identified] : 1+ LE Edema

## 2019-06-03 NOTE — ASSESSMENT
[FreeTextEntry1] : 86yoF w h/o cognitive decline who is here to see me for anemia, unclear etiology, receiving Iron tablets and procrit injections. \par -anemia work up performed on day of vsit\par -renal function okay. \par -spep is normal, but patient has a mildly abnormal FLC ratio, (normal is 0.2-1.65, patient is 1.66)\par -iron studies demonstrate a mild iron deficiency with a % sat of 10% (but picture seems to be mixed given TIBC of 225), ferritin 108, still could be some iron def\par -B12 and folate are within normal limits\par -coninue iron supplementation, but hb is good today with hb of 11.7\par -if constipation is an issue, could use venofer/iv iron infusion x approx 750mg-1g for full repletion\par

## 2019-07-25 ENCOUNTER — OUTPATIENT (OUTPATIENT)
Dept: OUTPATIENT SERVICES | Facility: HOSPITAL | Age: 84
LOS: 1 days | Discharge: HOME | End: 2019-07-25

## 2019-07-25 DIAGNOSIS — D64.9 ANEMIA, UNSPECIFIED: ICD-10-CM

## 2019-09-10 ENCOUNTER — OUTPATIENT (OUTPATIENT)
Dept: OUTPATIENT SERVICES | Facility: HOSPITAL | Age: 84
LOS: 1 days | Discharge: HOME | End: 2019-09-10

## 2019-09-10 DIAGNOSIS — D64.9 ANEMIA, UNSPECIFIED: ICD-10-CM

## 2019-09-12 ENCOUNTER — APPOINTMENT (OUTPATIENT)
Dept: HEMATOLOGY ONCOLOGY | Facility: CLINIC | Age: 84
End: 2019-09-12
Payer: MEDICARE

## 2019-09-12 ENCOUNTER — RESULT REVIEW (OUTPATIENT)
Age: 84
End: 2019-09-12

## 2019-09-12 VITALS
OXYGEN SATURATION: 100 % | WEIGHT: 154.54 LBS | RESPIRATION RATE: 14 BRPM | HEART RATE: 62 BPM | SYSTOLIC BLOOD PRESSURE: 135 MMHG | DIASTOLIC BLOOD PRESSURE: 78 MMHG | BODY MASS INDEX: 24.95 KG/M2 | TEMPERATURE: 97.9 F

## 2019-09-12 DIAGNOSIS — D64.9 ANEMIA, UNSPECIFIED: ICD-10-CM

## 2019-09-12 LAB
BASOPHILS # BLD AUTO: 0 K/UL — SIGNIFICANT CHANGE UP (ref 0–0.2)
BASOPHILS NFR BLD AUTO: 0.4 % — SIGNIFICANT CHANGE UP (ref 0–2)
EOSINOPHIL # BLD AUTO: 0.3 K/UL — SIGNIFICANT CHANGE UP (ref 0–0.5)
EOSINOPHIL NFR BLD AUTO: 3.6 % — SIGNIFICANT CHANGE UP (ref 0–6)
HCT VFR BLD CALC: 37.2 % — SIGNIFICANT CHANGE UP (ref 34.5–45)
HGB BLD-MCNC: 11.3 G/DL — LOW (ref 11.5–15.5)
LYMPHOCYTES # BLD AUTO: 2.1 K/UL — SIGNIFICANT CHANGE UP (ref 1–3.3)
LYMPHOCYTES # BLD AUTO: 28.1 % — SIGNIFICANT CHANGE UP (ref 13–44)
MCHC RBC-ENTMCNC: 24.3 PG — LOW (ref 27–34)
MCHC RBC-ENTMCNC: 30.5 G/DL — LOW (ref 32–36)
MCV RBC AUTO: 79.7 FL — LOW (ref 80–100)
MONOCYTES # BLD AUTO: 0.5 K/UL — SIGNIFICANT CHANGE UP (ref 0–0.9)
MONOCYTES NFR BLD AUTO: 6.9 % — SIGNIFICANT CHANGE UP (ref 2–14)
NEUTROPHILS # BLD AUTO: 4.6 K/UL — SIGNIFICANT CHANGE UP (ref 1.8–7.4)
NEUTROPHILS NFR BLD AUTO: 61 % — SIGNIFICANT CHANGE UP (ref 43–77)
PLATELET # BLD AUTO: 319 K/UL — SIGNIFICANT CHANGE UP (ref 150–400)
RBC # BLD: 4.66 M/UL — SIGNIFICANT CHANGE UP (ref 3.8–5.2)
RBC # FLD: 17.6 % — HIGH (ref 10.3–14.5)
WBC # BLD: 7.5 K/UL — SIGNIFICANT CHANGE UP (ref 3.8–10.5)
WBC # FLD AUTO: 7.5 K/UL — SIGNIFICANT CHANGE UP (ref 3.8–10.5)

## 2019-09-12 PROCEDURE — 99213 OFFICE O/P EST LOW 20 MIN: CPT

## 2019-09-13 PROBLEM — D64.9 ANEMIA: Status: ACTIVE | Noted: 2019-04-25

## 2019-09-13 NOTE — REVIEW OF SYSTEMS
[Fatigue] : fatigue [Negative] : Allergic/Immunologic [FreeTextEntry9] : difficulty walking since hosp in 2017 [FreeTextEntry7] : occasional indigestion

## 2019-09-13 NOTE — HISTORY OF PRESENT ILLNESS
[Disease:__________________________] : Disease: [unfilled] [de-identified] : This is an 86 year old female who is here to see me for anemia.\par \par She lives in a nursing home and has been unable to walk for some time- this seems to be what is keeping her in the NH.  SHe has a daugther who she sees 2x / month and is expecting a great granddaughter.\par \par She has a PMH of DM2, HTN, Sacral ulcer, lower extremety weakness, inability to ambulate since hospitalization in late 2017 and poor cognition.  She has been seeing someone from neurology for work up.  She also seems to have had iron deficiency anemia in the past.  She has been started on procrit at least since 2/2019. She has normal renal function on previous values. \par \par  [de-identified] : -she is feeling well\par -MCV is still slightly low\par -iron studies were suggestive of iRon deficiency at last visit\par -she is taking bid iron, tolerates okay, patient reports she used to have constipation but it is less often now\par

## 2019-09-13 NOTE — ASSESSMENT
[FreeTextEntry1] : 86yoF w h/o cognitive decline who is here to see me for anemia, unclear etiology, receiving Iron tablets and procrit injections. \par \par -renal function okay. \par -spep is normal, but patient has a mildly abnormal FLC ratio, (normal is 0.2-1.65, patient is 1.66)- can check yearly\par -iron studies she mixed picture, improved at this visit, still with low MCV< awaiting ferritin result. \par -B12 and folate are within normal limits\par -coninue iron supplementation, but hb is good today with hb of 11.3, if Hb drops below 10, could consider bmbx, but at a hb of 11.3, aside from ensuring vitamin repletion, nothing to do. \par -if bmbx or PCP believes iron infusion is required, asked md to reach out, at this time would continue current management and return yearly. At this time, current management is holding patients levels.\par -if constipation is an issue, could use venofer to help, but she does seem to be tolerating well\par

## 2020-06-02 LAB
ALBUMIN MFR SERPL ELPH: 49.3 %
ALBUMIN SERPL ELPH-MCNC: 3.3 G/DL
ALBUMIN SERPL-MCNC: 3 G/DL
ALBUMIN/GLOB SERPL: 1 RATIO
ALP BLD-CCNC: 57 U/L
ALPHA1 GLOB MFR SERPL ELPH: 6 %
ALPHA1 GLOB SERPL ELPH-MCNC: 0.4 G/DL
ALPHA2 GLOB MFR SERPL ELPH: 11.2 %
ALPHA2 GLOB SERPL ELPH-MCNC: 0.7 G/DL
ALT SERPL-CCNC: 5 U/L
ANION GAP SERPL CALC-SCNC: 13 MMOL/L
AST SERPL-CCNC: 16 U/L
B-GLOBULIN MFR SERPL ELPH: 14.5 %
B-GLOBULIN SERPL ELPH-MCNC: 0.9 G/DL
BILIRUB SERPL-MCNC: 0.2 MG/DL
BUN SERPL-MCNC: 18 MG/DL
CALCIUM SERPL-MCNC: 9.5 MG/DL
CHLORIDE SERPL-SCNC: 102 MMOL/L
CO2 SERPL-SCNC: 24 MMOL/L
CREAT SERPL-MCNC: 0.47 MG/DL
DEPRECATED KAPPA LC FREE/LAMBDA SER: 1.66 RATIO
DEPRECATED KAPPA LC FREE/LAMBDA SER: 1.66 RATIO
FERRITIN SERPL-MCNC: 108 NG/ML
FOLATE SERPL-MCNC: 7.1 NG/ML
GAMMA GLOB FLD ELPH-MCNC: 1.2 G/DL
GAMMA GLOB MFR SERPL ELPH: 19 %
GLUCOSE SERPL-MCNC: 124 MG/DL
HAPTOGLOB SERPL-MCNC: 141 MG/DL
IGA SER QL IEP: 316 MG/DL
IGA SER QL IEP: 316 MG/DL
IGG SER QL IEP: 1134 MG/DL
IGM SER QL IEP: 30 MG/DL
IGM SER QL IEP: 30 MG/DL
INTERPRETATION SERPL IEP-IMP: NORMAL
IRON SATN MFR SERPL: 10 %
IRON SATN MFR SERPL: 26 %
IRON SERPL-MCNC: 23 UG/DL
IRON SERPL-MCNC: 51 UG/DL
KAPPA LC CSF-MCNC: 3.07 MG/DL
KAPPA LC CSF-MCNC: 3.07 MG/DL
KAPPA LC SERPL-MCNC: 5.1 MG/DL
KAPPA LC SERPL-MCNC: 5.1 MG/DL
LDH SERPL-CCNC: 207 U/L
M PROTEIN SPEC IFE-MCNC: NORMAL
METHYLMALONATE SERPL-SCNC: 276 NMOL/L
POTASSIUM SERPL-SCNC: 5.3 MMOL/L
PROT SERPL-MCNC: 6.1 G/DL
SODIUM SERPL-SCNC: 139 MMOL/L
TIBC SERPL-MCNC: 198 UG/DL
TIBC SERPL-MCNC: 225 UG/DL
UIBC SERPL-MCNC: 147 UG/DL
UIBC SERPL-MCNC: 202 UG/DL
VIT B12 SERPL-MCNC: 492 PG/ML

## 2020-11-12 NOTE — PATIENT PROFILE ADULT. - ALCOHOL USE HISTORY SINGLE SELECT
Subjective


Date:: 11/12/20


Subjective:: 


Postop day 1 lumbar fusion: Patient is laying in bed at time of exam, she 

reports improvement in her right leg pain.  Reports apprehension to being 

discharged home today.  Patient reports that her  was not discharged on 

postop day 1 a lumbar fusion and had significant issues.  Patient also reports 

that she has a large number of stairs to get into her house and is worried about

being able to navigate the stairs.  She reports normal bowel and bladder 

function.  Ports she is eating well and drinking well





Reason For Visit: 


M51.16, M54.5, M43.16








Physical Exam


Vital Signs: 


                                        











Temp Pulse Resp BP Pulse Ox


 


 98.1 F   67   16   113/59 L  99 


 


 11/12/20 03:06  11/12/20 03:06  11/12/20 03:06  11/12/20 03:06  11/12/20 03:06








                                 Intake & Output











 11/11/20 11/12/20 11/13/20





 06:59 06:59 06:59


 


Intake Total  2560 


 


Output Total  20 


 


Balance  2540 


 


Weight  66 kg 











General appearance: PRESENT: no acute distress, cooperative


Head exam: PRESENT: atraumatic, normocephalic


Eye exam: PRESENT: PERRLA


Extremities exam: PRESENT: tenderness - Appropriate postoperative pain swelling 

and tenderness is appreciated.  ABSENT: calf tenderness, joint swelling


Musculoskeletal exam: PRESENT: ambulatory


Additional comments: 


Lumbar spine: Dressing is clean and dry and intact no signs of bleeding edges 

appreciated on the dressing.  





Neurological exam: PRESENT: alert, altered, awake, oriented to situation


Psychiatric exam: PRESENT: appropriate affect





Results


Laboratory Results: 


                                        





                                 11/06/20 10:55 





                                 11/06/20 10:55 








Impressions: 


                                        





Chest X-Ray  11/06/20 00:00


IMPRESSION:  NO SIGNIFICANT RADIOGRAPHIC FINDING IN THE CHEST.


 








Fluoroscopy  11/11/20 00:00


IMPRESSION:  IMAGE(S) OBTAINED DURING PROCEDURE.


 








Lumbar Spine X-Ray  11/11/20 00:00


IMPRESSION:  IMAGE(S) OBTAINED DURING PROCEDURE.


 














Assessment & Plan





- Time


Time Spent with patient: Less than 15 minutes





- Plan Summary


Plan Summary: 


Stop day 1 lumbar fusion: Patient is doing well status post lumbar fusion.  

Consider discharge home today if patient meets physical therapy goals and pain 

is under good control.  Prescriptions have been sent to her pharmacy for 

discharge.  Continue to follow-up postop lumbar fusion protocol never

## 2020-11-12 NOTE — ED ADULT NURSE NOTE - PRO INTERPRETER NEED 2
Your COVID-19 test performed today was negative. However, you have reported that you had a high risk exposure within the last 14 days. You should quarantine at home for 2 weeks from the last day of exposure and follow-up if you develop any concerning symptoms. If you are an essential worker you may be required to continue working as long as you do not have fever or symptoms but should continue to quarantine for the full 2 weeks outside of the work place.  
English

## 2021-11-19 NOTE — PHYSICAL THERAPY INITIAL EVALUATION ADULT - TRANSFER SAFETY CONCERNS NOTED: SIT/STAND, REHAB EVAL
(1) Other Medications/None decreased weight-shifting ability/decreased balance during turns/losing balance/decreased step length

## 2022-09-03 NOTE — PROGRESS NOTE ADULT - ATTENDING COMMENTS
Harshad Ruiz MD, FACP, FACG, AGAF  [facility]  [phone]
Jose Angel Baptiste MD, FACG, [facility]  [phone]
Sukumar Thakkar MD, FACG, FACP  [facility]  [phone]
No

## 2023-01-16 NOTE — PHYSICAL THERAPY INITIAL EVALUATION ADULT - IMPAIRMENTS CONTRIBUTING IMPAIRED BED MOBILITY, REHAB EVAL
pt with dressing c,d I sacrum/decreased strength/impaired postural control impaired postural control/cognition/decreased sensation/impaired balance/pt with dressing c,d I sacrum/impaired sensory feedback/decreased strength Klisyri Pregnancy And Lactation Text: It is unknown if this medication can harm a developing fetus or if it is excreted in breast milk.
